# Patient Record
Sex: FEMALE | Race: BLACK OR AFRICAN AMERICAN | Employment: PART TIME | URBAN - METROPOLITAN AREA
[De-identification: names, ages, dates, MRNs, and addresses within clinical notes are randomized per-mention and may not be internally consistent; named-entity substitution may affect disease eponyms.]

---

## 2020-06-25 ENCOUNTER — HOSPITAL ENCOUNTER (INPATIENT)
Age: 65
LOS: 7 days | Discharge: HOME | DRG: 441 | End: 2020-07-03
Attending: EMERGENCY MEDICINE | Admitting: INTERNAL MEDICINE
Payer: MEDICARE

## 2020-06-25 ENCOUNTER — APPOINTMENT (OUTPATIENT)
Dept: GENERAL RADIOLOGY | Age: 65
DRG: 441 | End: 2020-06-25
Payer: MEDICARE

## 2020-06-25 PROCEDURE — 93005 ELECTROCARDIOGRAM TRACING: CPT | Performed by: EMERGENCY MEDICINE

## 2020-06-25 PROCEDURE — 71045 X-RAY EXAM CHEST 1 VIEW: CPT

## 2020-06-25 PROCEDURE — 99285 EMERGENCY DEPT VISIT HI MDM: CPT

## 2020-06-25 RX ORDER — ALBUTEROL SULFATE 90 UG/1
2 AEROSOL, METERED RESPIRATORY (INHALATION) EVERY 6 HOURS PRN
COMMUNITY
End: 2022-05-11 | Stop reason: SDUPTHER

## 2020-06-25 RX ORDER — HYDROCHLOROTHIAZIDE 25 MG/1
25 TABLET ORAL DAILY
Status: ON HOLD | COMMUNITY
End: 2021-11-04 | Stop reason: HOSPADM

## 2020-06-25 RX ORDER — IBUPROFEN 800 MG/1
800 TABLET ORAL EVERY 8 HOURS PRN
Status: ON HOLD | COMMUNITY
End: 2020-07-03 | Stop reason: HOSPADM

## 2020-06-25 RX ORDER — HYDROCODONE BITARTRATE AND ACETAMINOPHEN 7.5; 325 MG/1; MG/1
1 TABLET ORAL EVERY 6 HOURS PRN
Status: ON HOLD | COMMUNITY
End: 2020-07-03 | Stop reason: HOSPADM

## 2020-06-25 RX ORDER — ASPIRIN 81 MG/1
81 TABLET, CHEWABLE ORAL DAILY
COMMUNITY

## 2020-06-25 RX ORDER — ATORVASTATIN CALCIUM 80 MG/1
80 TABLET, FILM COATED ORAL DAILY
COMMUNITY
End: 2021-11-23 | Stop reason: SDUPTHER

## 2020-06-25 RX ORDER — LOSARTAN POTASSIUM 100 MG/1
100 TABLET ORAL DAILY
Status: ON HOLD | COMMUNITY
End: 2021-11-04 | Stop reason: HOSPADM

## 2020-06-25 RX ORDER — CEPHALEXIN 500 MG/1
500 CAPSULE ORAL 3 TIMES DAILY
Status: ON HOLD | COMMUNITY
End: 2020-07-03 | Stop reason: HOSPADM

## 2020-06-25 RX ORDER — IPRATROPIUM BROMIDE AND ALBUTEROL SULFATE 2.5; .5 MG/3ML; MG/3ML
1 SOLUTION RESPIRATORY (INHALATION) EVERY 4 HOURS
COMMUNITY
End: 2022-05-11 | Stop reason: SDUPTHER

## 2020-06-25 RX ORDER — GABAPENTIN 300 MG/1
300 CAPSULE ORAL 3 TIMES DAILY
COMMUNITY

## 2020-06-25 RX ORDER — 0.9 % SODIUM CHLORIDE 0.9 %
500 INTRAVENOUS SOLUTION INTRAVENOUS ONCE
Status: COMPLETED | OUTPATIENT
Start: 2020-06-25 | End: 2020-06-26

## 2020-06-25 RX ORDER — SUMATRIPTAN 20 MG/1
1 SPRAY NASAL DAILY PRN
COMMUNITY
End: 2021-04-05

## 2020-06-25 ASSESSMENT — PAIN SCALES - GENERAL: PAINLEVEL_OUTOF10: 5

## 2020-06-26 ENCOUNTER — APPOINTMENT (OUTPATIENT)
Dept: ULTRASOUND IMAGING | Age: 65
DRG: 441 | End: 2020-06-26
Payer: MEDICARE

## 2020-06-26 ENCOUNTER — APPOINTMENT (OUTPATIENT)
Dept: CT IMAGING | Age: 65
DRG: 441 | End: 2020-06-26
Payer: MEDICARE

## 2020-06-26 PROBLEM — E87.5 HYPERKALEMIA: Status: ACTIVE | Noted: 2020-06-26

## 2020-06-26 PROBLEM — E87.6 HYPOKALEMIA: Status: ACTIVE | Noted: 2020-06-26

## 2020-06-26 PROBLEM — I25.10 CAD (CORONARY ARTERY DISEASE): Status: ACTIVE | Noted: 2020-06-26

## 2020-06-26 PROBLEM — Z20.822 SUSPECTED COVID-19 VIRUS INFECTION: Status: ACTIVE | Noted: 2020-06-26

## 2020-06-26 PROBLEM — R74.01 TRANSAMINITIS: Status: ACTIVE | Noted: 2020-06-26

## 2020-06-26 PROBLEM — R77.8 TROPONIN LEVEL ELEVATED: Status: ACTIVE | Noted: 2020-06-26

## 2020-06-26 PROBLEM — I21.4 NSTEMI (NON-ST ELEVATED MYOCARDIAL INFARCTION) (HCC): Status: ACTIVE | Noted: 2020-06-26

## 2020-06-26 PROBLEM — K74.60 CIRRHOSIS (HCC): Status: ACTIVE | Noted: 2020-06-26

## 2020-06-26 PROBLEM — I10 HTN (HYPERTENSION): Status: ACTIVE | Noted: 2020-06-26

## 2020-06-26 PROBLEM — R79.89 TROPONIN LEVEL ELEVATED: Status: ACTIVE | Noted: 2020-06-26

## 2020-06-26 PROBLEM — F10.10 ALCOHOL ABUSE: Status: ACTIVE | Noted: 2020-06-26

## 2020-06-26 PROBLEM — R94.31 ABNORMAL EKG: Status: ACTIVE | Noted: 2020-06-26

## 2020-06-26 PROBLEM — J44.9 COPD (CHRONIC OBSTRUCTIVE PULMONARY DISEASE) (HCC): Status: ACTIVE | Noted: 2020-06-26

## 2020-06-26 PROBLEM — R10.9 ABDOMINAL PAIN: Status: ACTIVE | Noted: 2020-06-26

## 2020-06-26 LAB
A/G RATIO: 0.6 (ref 1.1–2.2)
A/G RATIO: 0.7 (ref 1.1–2.2)
ALBUMIN SERPL-MCNC: 2.8 G/DL (ref 3.4–5)
ALBUMIN SERPL-MCNC: 3.1 G/DL (ref 3.4–5)
ALP BLD-CCNC: 245 U/L (ref 40–129)
ALP BLD-CCNC: 281 U/L (ref 40–129)
ALT SERPL-CCNC: 559 U/L (ref 10–40)
ALT SERPL-CCNC: 606 U/L (ref 10–40)
ANION GAP SERPL CALCULATED.3IONS-SCNC: 6 MMOL/L (ref 3–16)
ANION GAP SERPL CALCULATED.3IONS-SCNC: 8 MMOL/L (ref 3–16)
ANION GAP SERPL CALCULATED.3IONS-SCNC: 9 MMOL/L (ref 3–16)
AST SERPL-CCNC: 662 U/L (ref 15–37)
AST SERPL-CCNC: 723 U/L (ref 15–37)
BASOPHILS ABSOLUTE: 0.1 K/UL (ref 0–0.2)
BASOPHILS RELATIVE PERCENT: 1.2 %
BILIRUB SERPL-MCNC: 1.9 MG/DL (ref 0–1)
BILIRUB SERPL-MCNC: 2.1 MG/DL (ref 0–1)
BILIRUBIN URINE: ABNORMAL
BLOOD, URINE: ABNORMAL
BUN BLDV-MCNC: 14 MG/DL (ref 7–20)
BUN BLDV-MCNC: 14 MG/DL (ref 7–20)
BUN BLDV-MCNC: 15 MG/DL (ref 7–20)
CALCIUM SERPL-MCNC: 8.3 MG/DL (ref 8.3–10.6)
CALCIUM SERPL-MCNC: 8.6 MG/DL (ref 8.3–10.6)
CALCIUM SERPL-MCNC: 8.9 MG/DL (ref 8.3–10.6)
CHLORIDE BLD-SCNC: 101 MMOL/L (ref 99–110)
CHLORIDE BLD-SCNC: 101 MMOL/L (ref 99–110)
CHLORIDE BLD-SCNC: 105 MMOL/L (ref 99–110)
CLARITY: CLEAR
CO2: 22 MMOL/L (ref 21–32)
CO2: 24 MMOL/L (ref 21–32)
CO2: 25 MMOL/L (ref 21–32)
COLOR: ABNORMAL
CREAT SERPL-MCNC: 0.6 MG/DL (ref 0.6–1.2)
CREAT SERPL-MCNC: 0.7 MG/DL (ref 0.6–1.2)
CREAT SERPL-MCNC: 0.8 MG/DL (ref 0.6–1.2)
EKG ATRIAL RATE: 59 BPM
EKG DIAGNOSIS: NORMAL
EKG P AXIS: 35 DEGREES
EKG P-R INTERVAL: 80 MS
EKG Q-T INTERVAL: 460 MS
EKG QRS DURATION: 102 MS
EKG QTC CALCULATION (BAZETT): 455 MS
EKG R AXIS: -8 DEGREES
EKG T AXIS: 27 DEGREES
EKG VENTRICULAR RATE: 59 BPM
EOSINOPHILS ABSOLUTE: 0.2 K/UL (ref 0–0.6)
EOSINOPHILS RELATIVE PERCENT: 2.7 %
EPITHELIAL CELLS, UA: 5 /HPF (ref 0–5)
ETHANOL: NORMAL MG/DL (ref 0–0.08)
GFR AFRICAN AMERICAN: >60
GFR NON-AFRICAN AMERICAN: >60
GLOBULIN: 4.5 G/DL
GLOBULIN: 4.6 G/DL
GLUCOSE BLD-MCNC: 105 MG/DL (ref 70–99)
GLUCOSE BLD-MCNC: 78 MG/DL (ref 70–99)
GLUCOSE BLD-MCNC: 89 MG/DL (ref 70–99)
GLUCOSE URINE: NEGATIVE MG/DL
HCT VFR BLD CALC: 40.4 % (ref 36–48)
HEMOGLOBIN: 13.8 G/DL (ref 12–16)
HYALINE CASTS: ABNORMAL /LPF (ref 0–2)
INR BLD: 1.22 (ref 0.86–1.14)
KETONES, URINE: NEGATIVE MG/DL
LEUKOCYTE ESTERASE, URINE: ABNORMAL
LIPASE: 82 U/L (ref 13–60)
LV EF: 70 %
LVEF MODALITY: NORMAL
LYMPHOCYTES ABSOLUTE: 2.2 K/UL (ref 1–5.1)
LYMPHOCYTES RELATIVE PERCENT: 38.9 %
MCH RBC QN AUTO: 32.8 PG (ref 26–34)
MCHC RBC AUTO-ENTMCNC: 34.1 G/DL (ref 31–36)
MCV RBC AUTO: 96.2 FL (ref 80–100)
MICROSCOPIC EXAMINATION: YES
MONOCYTES ABSOLUTE: 0.9 K/UL (ref 0–1.3)
MONOCYTES RELATIVE PERCENT: 16.3 %
NEUTROPHILS ABSOLUTE: 2.3 K/UL (ref 1.7–7.7)
NEUTROPHILS RELATIVE PERCENT: 40.9 %
NITRITE, URINE: NEGATIVE
PDW BLD-RTO: 15.2 % (ref 12.4–15.4)
PH UA: 7 (ref 5–8)
PLATELET # BLD: 181 K/UL (ref 135–450)
PMV BLD AUTO: 8.4 FL (ref 5–10.5)
POTASSIUM REFLEX MAGNESIUM: 5.7 MMOL/L (ref 3.5–5.1)
POTASSIUM SERPL-SCNC: 3.3 MMOL/L (ref 3.5–5.1)
POTASSIUM SERPL-SCNC: 4 MMOL/L (ref 3.5–5.1)
PROTEIN UA: 100 MG/DL
PROTHROMBIN TIME: 14.2 SEC (ref 10–13.2)
RBC # BLD: 4.2 M/UL (ref 4–5.2)
RBC UA: 2 /HPF (ref 0–4)
REASON FOR REJECTION: NORMAL
REJECTED TEST: NORMAL
SARS-COV-2, PCR: NOT DETECTED
SODIUM BLD-SCNC: 131 MMOL/L (ref 136–145)
SODIUM BLD-SCNC: 134 MMOL/L (ref 136–145)
SODIUM BLD-SCNC: 136 MMOL/L (ref 136–145)
SPECIFIC GRAVITY UA: 1.02 (ref 1–1.03)
TOTAL PROTEIN: 7.3 G/DL (ref 6.4–8.2)
TOTAL PROTEIN: 7.7 G/DL (ref 6.4–8.2)
TRIGL SERPL-MCNC: 94 MG/DL (ref 0–150)
TROPONIN: 0.03 NG/ML
TROPONIN: 0.03 NG/ML
TROPONIN: 0.05 NG/ML
TROPONIN: 0.06 NG/ML
URINE REFLEX TO CULTURE: ABNORMAL
URINE TYPE: ABNORMAL
UROBILINOGEN, URINE: 4 E.U./DL
WBC # BLD: 5.6 K/UL (ref 4–11)
WBC UA: 9 /HPF (ref 0–5)

## 2020-06-26 PROCEDURE — 1200000000 HC SEMI PRIVATE

## 2020-06-26 PROCEDURE — 2580000003 HC RX 258: Performed by: INTERNAL MEDICINE

## 2020-06-26 PROCEDURE — 80053 COMPREHEN METABOLIC PANEL: CPT

## 2020-06-26 PROCEDURE — 6370000000 HC RX 637 (ALT 250 FOR IP): Performed by: INTERNAL MEDICINE

## 2020-06-26 PROCEDURE — 6360000002 HC RX W HCPCS: Performed by: INTERNAL MEDICINE

## 2020-06-26 PROCEDURE — U0003 INFECTIOUS AGENT DETECTION BY NUCLEIC ACID (DNA OR RNA); SEVERE ACUTE RESPIRATORY SYNDROME CORONAVIRUS 2 (SARS-COV-2) (CORONAVIRUS DISEASE [COVID-19]), AMPLIFIED PROBE TECHNIQUE, MAKING USE OF HIGH THROUGHPUT TECHNOLOGIES AS DESCRIBED BY CMS-2020-01-R: HCPCS

## 2020-06-26 PROCEDURE — 96374 THER/PROPH/DIAG INJ IV PUSH: CPT

## 2020-06-26 PROCEDURE — 80074 ACUTE HEPATITIS PANEL: CPT

## 2020-06-26 PROCEDURE — 85610 PROTHROMBIN TIME: CPT

## 2020-06-26 PROCEDURE — 84484 ASSAY OF TROPONIN QUANT: CPT

## 2020-06-26 PROCEDURE — 85025 COMPLETE CBC W/AUTO DIFF WBC: CPT

## 2020-06-26 PROCEDURE — 94640 AIRWAY INHALATION TREATMENT: CPT

## 2020-06-26 PROCEDURE — 93306 TTE W/DOPPLER COMPLETE: CPT

## 2020-06-26 PROCEDURE — 6360000002 HC RX W HCPCS: Performed by: EMERGENCY MEDICINE

## 2020-06-26 PROCEDURE — 93010 ELECTROCARDIOGRAM REPORT: CPT | Performed by: INTERNAL MEDICINE

## 2020-06-26 PROCEDURE — 81001 URINALYSIS AUTO W/SCOPE: CPT

## 2020-06-26 PROCEDURE — 86706 HEP B SURFACE ANTIBODY: CPT

## 2020-06-26 PROCEDURE — 76705 ECHO EXAM OF ABDOMEN: CPT

## 2020-06-26 PROCEDURE — 86038 ANTINUCLEAR ANTIBODIES: CPT

## 2020-06-26 PROCEDURE — 84478 ASSAY OF TRIGLYCERIDES: CPT

## 2020-06-26 PROCEDURE — 2580000003 HC RX 258: Performed by: EMERGENCY MEDICINE

## 2020-06-26 PROCEDURE — C9113 INJ PANTOPRAZOLE SODIUM, VIA: HCPCS | Performed by: INTERNAL MEDICINE

## 2020-06-26 PROCEDURE — 74176 CT ABD & PELVIS W/O CONTRAST: CPT

## 2020-06-26 PROCEDURE — 86708 HEPATITIS A ANTIBODY: CPT

## 2020-06-26 PROCEDURE — 36415 COLL VENOUS BLD VENIPUNCTURE: CPT

## 2020-06-26 PROCEDURE — 99222 1ST HOSP IP/OBS MODERATE 55: CPT | Performed by: INTERNAL MEDICINE

## 2020-06-26 PROCEDURE — 96361 HYDRATE IV INFUSION ADD-ON: CPT

## 2020-06-26 PROCEDURE — 2060000000 HC ICU INTERMEDIATE R&B

## 2020-06-26 PROCEDURE — 86039 ANTINUCLEAR ANTIBODIES (ANA): CPT

## 2020-06-26 PROCEDURE — 83516 IMMUNOASSAY NONANTIBODY: CPT

## 2020-06-26 PROCEDURE — 6370000000 HC RX 637 (ALT 250 FOR IP): Performed by: EMERGENCY MEDICINE

## 2020-06-26 PROCEDURE — 83690 ASSAY OF LIPASE: CPT

## 2020-06-26 PROCEDURE — G0480 DRUG TEST DEF 1-7 CLASSES: HCPCS

## 2020-06-26 PROCEDURE — 94761 N-INVAS EAR/PLS OXIMETRY MLT: CPT

## 2020-06-26 RX ORDER — ATORVASTATIN CALCIUM 80 MG/1
80 TABLET, FILM COATED ORAL DAILY
Status: DISCONTINUED | OUTPATIENT
Start: 2020-06-26 | End: 2020-07-03 | Stop reason: HOSPADM

## 2020-06-26 RX ORDER — MAGNESIUM SULFATE IN WATER 40 MG/ML
2 INJECTION, SOLUTION INTRAVENOUS PRN
Status: DISCONTINUED | OUTPATIENT
Start: 2020-06-26 | End: 2020-07-03 | Stop reason: HOSPADM

## 2020-06-26 RX ORDER — PANTOPRAZOLE SODIUM 40 MG/10ML
40 INJECTION, POWDER, LYOPHILIZED, FOR SOLUTION INTRAVENOUS DAILY
Status: DISCONTINUED | OUTPATIENT
Start: 2020-06-26 | End: 2020-06-27

## 2020-06-26 RX ORDER — POTASSIUM CHLORIDE 20 MEQ/1
40 TABLET, EXTENDED RELEASE ORAL ONCE
Status: COMPLETED | OUTPATIENT
Start: 2020-06-26 | End: 2020-06-26

## 2020-06-26 RX ORDER — POTASSIUM CHLORIDE 7.45 MG/ML
10 INJECTION INTRAVENOUS PRN
Status: DISCONTINUED | OUTPATIENT
Start: 2020-06-26 | End: 2020-07-03 | Stop reason: HOSPADM

## 2020-06-26 RX ORDER — LOSARTAN POTASSIUM 100 MG/1
100 TABLET ORAL DAILY
Status: DISCONTINUED | OUTPATIENT
Start: 2020-06-26 | End: 2020-06-27

## 2020-06-26 RX ORDER — MORPHINE SULFATE 4 MG/ML
4 INJECTION, SOLUTION INTRAMUSCULAR; INTRAVENOUS EVERY 4 HOURS PRN
Status: DISCONTINUED | OUTPATIENT
Start: 2020-06-26 | End: 2020-07-01

## 2020-06-26 RX ORDER — SODIUM CHLORIDE, SODIUM LACTATE, POTASSIUM CHLORIDE, CALCIUM CHLORIDE 600; 310; 30; 20 MG/100ML; MG/100ML; MG/100ML; MG/100ML
INJECTION, SOLUTION INTRAVENOUS CONTINUOUS
Status: DISCONTINUED | OUTPATIENT
Start: 2020-06-26 | End: 2020-06-28

## 2020-06-26 RX ORDER — GABAPENTIN 300 MG/1
300 CAPSULE ORAL 3 TIMES DAILY
Status: DISCONTINUED | OUTPATIENT
Start: 2020-06-26 | End: 2020-07-03 | Stop reason: HOSPADM

## 2020-06-26 RX ORDER — PROMETHAZINE HYDROCHLORIDE 25 MG/1
12.5 TABLET ORAL EVERY 6 HOURS PRN
Status: DISCONTINUED | OUTPATIENT
Start: 2020-06-26 | End: 2020-07-03 | Stop reason: HOSPADM

## 2020-06-26 RX ORDER — ASPIRIN 81 MG/1
81 TABLET, CHEWABLE ORAL DAILY
Status: DISCONTINUED | OUTPATIENT
Start: 2020-06-27 | End: 2020-06-29

## 2020-06-26 RX ORDER — ACETAMINOPHEN 325 MG/1
650 TABLET ORAL EVERY 6 HOURS PRN
Status: DISCONTINUED | OUTPATIENT
Start: 2020-06-26 | End: 2020-07-03 | Stop reason: HOSPADM

## 2020-06-26 RX ORDER — POTASSIUM CHLORIDE 20 MEQ/1
40 TABLET, EXTENDED RELEASE ORAL PRN
Status: DISCONTINUED | OUTPATIENT
Start: 2020-06-26 | End: 2020-07-03 | Stop reason: HOSPADM

## 2020-06-26 RX ORDER — ONDANSETRON 2 MG/ML
4 INJECTION INTRAMUSCULAR; INTRAVENOUS EVERY 6 HOURS PRN
Status: DISCONTINUED | OUTPATIENT
Start: 2020-06-26 | End: 2020-07-03 | Stop reason: HOSPADM

## 2020-06-26 RX ORDER — POLYETHYLENE GLYCOL 3350 17 G/17G
17 POWDER, FOR SOLUTION ORAL DAILY PRN
Status: DISCONTINUED | OUTPATIENT
Start: 2020-06-26 | End: 2020-07-03 | Stop reason: HOSPADM

## 2020-06-26 RX ORDER — ONDANSETRON 2 MG/ML
4 INJECTION INTRAMUSCULAR; INTRAVENOUS ONCE
Status: COMPLETED | OUTPATIENT
Start: 2020-06-26 | End: 2020-06-26

## 2020-06-26 RX ORDER — DICYCLOMINE HYDROCHLORIDE 10 MG/1
20 CAPSULE ORAL ONCE
Status: COMPLETED | OUTPATIENT
Start: 2020-06-26 | End: 2020-06-26

## 2020-06-26 RX ORDER — NITROGLYCERIN 0.4 MG/1
0.4 TABLET SUBLINGUAL EVERY 5 MIN PRN
Status: DISCONTINUED | OUTPATIENT
Start: 2020-06-26 | End: 2020-07-03 | Stop reason: HOSPADM

## 2020-06-26 RX ORDER — ONDANSETRON 4 MG/1
4 TABLET, FILM COATED ORAL EVERY 8 HOURS PRN
Qty: 10 TABLET | Refills: 0 | Status: SHIPPED | OUTPATIENT
Start: 2020-06-26 | End: 2020-06-26

## 2020-06-26 RX ORDER — SODIUM CHLORIDE 0.9 % (FLUSH) 0.9 %
10 SYRINGE (ML) INJECTION PRN
Status: DISCONTINUED | OUTPATIENT
Start: 2020-06-26 | End: 2020-07-03 | Stop reason: HOSPADM

## 2020-06-26 RX ORDER — ASPIRIN 81 MG/1
324 TABLET, CHEWABLE ORAL ONCE
Status: COMPLETED | OUTPATIENT
Start: 2020-06-26 | End: 2020-06-26

## 2020-06-26 RX ORDER — DICYCLOMINE HYDROCHLORIDE 10 MG/1
20 CAPSULE ORAL EVERY 6 HOURS PRN
Qty: 20 CAPSULE | Refills: 0 | Status: SHIPPED | OUTPATIENT
Start: 2020-06-26 | End: 2020-06-26

## 2020-06-26 RX ORDER — SODIUM CHLORIDE 0.9 % (FLUSH) 0.9 %
10 SYRINGE (ML) INJECTION EVERY 12 HOURS SCHEDULED
Status: DISCONTINUED | OUTPATIENT
Start: 2020-06-26 | End: 2020-07-03 | Stop reason: HOSPADM

## 2020-06-26 RX ORDER — ACETAMINOPHEN 650 MG/1
650 SUPPOSITORY RECTAL EVERY 6 HOURS PRN
Status: DISCONTINUED | OUTPATIENT
Start: 2020-06-26 | End: 2020-07-03 | Stop reason: HOSPADM

## 2020-06-26 RX ADMIN — SODIUM CHLORIDE, POTASSIUM CHLORIDE, SODIUM LACTATE AND CALCIUM CHLORIDE: 600; 310; 30; 20 INJECTION, SOLUTION INTRAVENOUS at 14:13

## 2020-06-26 RX ADMIN — POTASSIUM CHLORIDE 40 MEQ: 1500 TABLET, EXTENDED RELEASE ORAL at 03:16

## 2020-06-26 RX ADMIN — ATORVASTATIN CALCIUM 80 MG: 80 TABLET, FILM COATED ORAL at 14:11

## 2020-06-26 RX ADMIN — SODIUM CHLORIDE 500 ML: 9 INJECTION, SOLUTION INTRAVENOUS at 00:35

## 2020-06-26 RX ADMIN — DICYCLOMINE HYDROCHLORIDE 20 MG: 10 CAPSULE ORAL at 00:45

## 2020-06-26 RX ADMIN — ASPIRIN 81 MG 324 MG: 81 TABLET ORAL at 03:16

## 2020-06-26 RX ADMIN — PANTOPRAZOLE SODIUM 40 MG: 40 INJECTION, POWDER, FOR SOLUTION INTRAVENOUS at 14:11

## 2020-06-26 RX ADMIN — ONDANSETRON 4 MG: 2 INJECTION INTRAMUSCULAR; INTRAVENOUS at 00:45

## 2020-06-26 RX ADMIN — GLYCOPYRROLATE AND FORMOTEROL FUMARATE 2 PUFF: 9; 4.8 AEROSOL, METERED RESPIRATORY (INHALATION) at 21:10

## 2020-06-26 RX ADMIN — SODIUM CHLORIDE, POTASSIUM CHLORIDE, SODIUM LACTATE AND CALCIUM CHLORIDE: 600; 310; 30; 20 INJECTION, SOLUTION INTRAVENOUS at 21:41

## 2020-06-26 RX ADMIN — GABAPENTIN 300 MG: 300 CAPSULE ORAL at 21:37

## 2020-06-26 RX ADMIN — GABAPENTIN 300 MG: 300 CAPSULE ORAL at 14:12

## 2020-06-26 RX ADMIN — ENOXAPARIN SODIUM 70 MG: 80 INJECTION SUBCUTANEOUS at 21:37

## 2020-06-26 RX ADMIN — METOPROLOL TARTRATE 25 MG: 25 TABLET, FILM COATED ORAL at 14:11

## 2020-06-26 RX ADMIN — LOSARTAN POTASSIUM 100 MG: 100 TABLET, FILM COATED ORAL at 14:11

## 2020-06-26 RX ADMIN — ENOXAPARIN SODIUM 70 MG: 80 INJECTION SUBCUTANEOUS at 14:11

## 2020-06-26 ASSESSMENT — PAIN DESCRIPTION - ORIENTATION: ORIENTATION: UPPER

## 2020-06-26 ASSESSMENT — PAIN SCALES - GENERAL
PAINLEVEL_OUTOF10: 0
PAINLEVEL_OUTOF10: 2

## 2020-06-26 ASSESSMENT — PAIN DESCRIPTION - ONSET: ONSET: ON-GOING

## 2020-06-26 ASSESSMENT — PAIN DESCRIPTION - FREQUENCY: FREQUENCY: CONTINUOUS

## 2020-06-26 ASSESSMENT — PAIN DESCRIPTION - PAIN TYPE: TYPE: ACUTE PAIN

## 2020-06-26 ASSESSMENT — PAIN DESCRIPTION - LOCATION: LOCATION: ABDOMEN

## 2020-06-26 NOTE — H&P
HOSPITALISTS HISTORY AND PHYSICAL    6/26/2020 7:53 AM    Patient Information:  Dang Baca is a 76 y.o. female [id number]  PCP:  Cameron Short MD (Tel: [phone] )    Chief complaint:    Chief Complaint   Patient presents with    Abdominal Pain     Pt states has a uti, antibiotics, and has hx of high liver enzymes and pancreas issues. Pt states abdominal pain x months, worse today and patient states feels like her equilibrium is off as well. History of Present Illness:  Juan Gill is a 76 y.o. female with history of alcoholic pancreatitis, CAD s/p stents, COPD, HTN, h/o alcohol abuse came to ER with complaints of abdominal pain. States she drank vodka on her birthday. Had abdominal pain a few days later. Complains of epigastric pain. Has post prandial pain and nausea. Some GERD. No melena, hematochezia, vomiting, CP. Has progressing HATFIELD and SOB. Has less functional capacity than baseline over weeks. No vaginal bleeding, dysuria or hematuria. No numbness, weakness or tingling. Found to have NSTEMI and acute pancreatitis with transaminitis and cirrhosis in ED. Otherwise complete ROS is negative unless listed above. REVIEW OF SYSTEMS:   Pertinent positives as noted in HPI. All other systems were reviewed and are negative. Past Medical History:   has a past medical history of HTN (hypertension). Past Surgical History:   has a past surgical history that includes Abdomen surgery. Medications:  No current facility-administered medications on file prior to encounter.       Current Outpatient Medications on File Prior to Encounter   Medication Sig Dispense Refill    aclidinium (TUDORZA PRESSAIR) 400 MCG/ACT AEPB inhaler Inhale 1 puff into the lungs 2 times daily      albuterol sulfate HFA (PROAIR HFA) 108 (90 Base) MCG/ACT inhaler Inhale 2 puffs into the lungs every 6 hours as needed for Wheezing      aspirin 81 MG chewable tablet Take 81 mg by mouth daily      atorvastatin (LIPITOR) 80 MG tablet Take 80 mg by mouth daily      gabapentin (NEURONTIN) 300 MG capsule Take 300 mg by mouth 3 times daily.  hydroCHLOROthiazide (HYDRODIURIL) 25 MG tablet Take 25 mg by mouth daily      HYDROcodone-acetaminophen (NORCO) 7.5-325 MG per tablet Take 1 tablet by mouth every 6 hours as needed for Pain.  ibuprofen (ADVIL;MOTRIN) 800 MG tablet Take 800 mg by mouth every 8 hours as needed for Pain      ipratropium-albuterol (DUONEB) 0.5-2.5 (3) MG/3ML SOLN nebulizer solution Inhale 1 vial into the lungs every 4 hours      losartan (COZAAR) 100 MG tablet Take 100 mg by mouth daily      metoprolol tartrate (LOPRESSOR) 25 MG tablet Take 25 mg by mouth 2 times daily      SUMAtriptan (IMITREX) 20 MG/ACT nasal spray 1 spray by Nasal route daily as needed for Migraine      umeclidinium-vilanterol (ANORO ELLIPTA) 62.5-25 MCG/INH AEPB inhaler Inhale 1 puff into the lungs daily      cephALEXin (KEFLEX) 500 MG capsule Take 500 mg by mouth 3 times daily         Allergies: Allergies   Allergen Reactions    Lisinopril Swelling        Social History:  Patient Lives with family   reports that she has never smoked. She has never used smokeless tobacco. She reports previous alcohol use. She reports that she does not use drugs. Family History:  family history includes Diabetes in her sister. Physical Exam:  BP (!) 102/55   Pulse 58   Temp 98.5 °F (36.9 °C) (Oral)   Resp 21   Ht 5' 6\" (1.676 m)   Wt 157 lb (71.2 kg)   SpO2 92%   BMI 25.34 kg/m²     General appearance:  Appears comfortable. Well nourished  Eyes: Sclera clear, pupils equal  ENT: Moist mucus membranes, no thrush. Trachea midline. Cardiovascular: Regular rhythm, normal S1, S2. No murmur, gallop, rub.  No edema in lower extremities  Respiratory: Clear to auscultation bilaterally, no wheeze, good inspiratory effort  Gastrointestinal: Abdomen soft, epigastric/LUQ tenderness, not distended, normal bowel sounds  Musculoskeletal: No cyanosis in digits, neck supple  Neurology: Grossly intact. Alert and oriented in time, place and person. No speech or motor deficits  Psychiatry: Appropriate affect. Not agitated  Skin: Warm, dry, normal turgor, no rash  Brisk capillary refill, peripheral pulses palpable   Labs:  CBC:   Lab Results   Component Value Date    WBC 5.6 06/26/2020    RBC 4.20 06/26/2020    HGB 13.8 06/26/2020    HCT 40.4 06/26/2020    MCV 96.2 06/26/2020    MCH 32.8 06/26/2020    MCHC 34.1 06/26/2020    RDW 15.2 06/26/2020     06/26/2020    MPV 8.4 06/26/2020     BMP:    Lab Results   Component Value Date     06/26/2020    K 3.3 06/26/2020    K 5.7 06/26/2020     06/26/2020    CO2 24 06/26/2020    BUN 14 06/26/2020    CREATININE 0.7 06/26/2020    CALCIUM 8.6 06/26/2020    GFRAA >60 06/26/2020    LABGLOM >60 06/26/2020    GLUCOSE 105 06/26/2020     CT ABDOMEN PELVIS WO CONTRAST Additional Contrast? None   Final Result   No acute abnormality. The liver appears cirrhotic and possibly fibrotic. This can be further evaluated with nonemergent MRI. XR CHEST PORTABLE   Final Result   Hyperplasia lungs. Bibasilar increased density could be related to bronchovascular padding. Pneumonia could be considered, and particularly given asymmetric right mid   lung increased opacity. Problem List  Principal Problem:    Abdominal pain  Active Problems:    Troponin level elevated    NSTEMI (non-ST elevated myocardial infarction) (HCC)    Transaminitis    CAD (coronary artery disease)    COPD (chronic obstructive pulmonary disease) (HCC)    HTN (hypertension)    Hyperkalemia    Hypokalemia    Cirrhosis (HCC)    Alcohol abuse    Suspected COVID-19 virus infection    Abnormal EKG  Resolved Problems:    * No resolved hospital problems. *        Assessment/Plan:   1.  Droplet plus isolation until COVID ruled out  2. NPO  3. IVF  4. Lovenox bid  5. Cont ASA, Lipitor, Lopressor, and Cozaar  6. Check RUQ US for abdominal pain  7. Check TG  8. Stop HCTZ as this is rare cause of pancreatitis  9. Morphine IV PRN   10. Serial troponin  11. Cardiology consult for NSTEMI  12. GI consult for abdominal pain  13. Telemetry  14. Check Ethanol level  15. Banana bag X 3 days  16. Check Echo for SOB  17. Cont Spiriva and inhalers from home      DVT prophylaxis Lovenox bid  Code status Full code  Diet  NPO  IV access Peripheral  Paredes Catheter No    Admit as inpatient. I anticipate hospitalization spanning more than two midnights for investigation and treatment of the above medically necessary diagnoses. Discussed with patient and nursing. Will see what GI and Cardio think. Home when ready.     Tono Romero MD    6/26/2020 7:53 AM

## 2020-06-26 NOTE — FLOWSHEET NOTE
06/26/20 1215   Vital Signs   Temp 98.4 °F (36.9 °C)   Temp Source Oral   Pulse 56   Heart Rate Source Brachial   Resp 11   BP (!) 106/57   BP Location Right upper arm   MAP (mmHg) 65   Patient Position Semi fowlers   Level of Consciousness 0   MEWS Score 0   Patient Currently in Pain Yes   Height and Weight   Height 5' 6\" (1.676 m)   Weight 157 lb (71.2 kg)   Weight Method Stated   BSA (Calculated - sq m) 1.82 sq meters   BMI (Calculated) 25.4   Pain Assessment   Pain Assessment 0-10   Pain Level 0   Non-Pharmaceutical Pain Intervention(s) Declines   Response to Pain Intervention Patient Satisfied   Oxygen Therapy   SpO2 96 %   Pulse Oximeter Device Mode Continuous   Pulse Oximeter Device Location Finger   O2 Device None (Room air)     Admission assessment compete. VSS. Pt. Is alert and oriented resting comfortably in bed. Pt. Has complaints of stomach tenderness and intermittent stomach pain. Patient has no complaints of chest pain or SOB. POC discussed with patient and patient states understanding and is in agreement with plan. Call light and bedside table within reach. Will continue to monitor.  María Flynn

## 2020-06-26 NOTE — PLAN OF CARE
Problem: Safety:  Goal: Free from accidental physical injury  Description: Free from accidental physical injury  Outcome: Ongoing     Problem: Pain:  Goal: Pain level will decrease  Description: Pain level will decrease  Outcome: Ongoing  Goal: Control of acute pain  Description: Control of acute pain  Outcome: Ongoing

## 2020-06-26 NOTE — CONSULTS
[address] [facility]  [phone]      Chief Complaint   Patient presents with    Abdominal Pain     Pt states has a uti, antibiotics, and has hx of high liver enzymes and pancreas issues. Pt states abdominal pain x months, worse today and patient states feels like her equilibrium is off as well. History of Present Illness:  Sarita Agrawal is a 76 y.o. patient who presented to the hospital with complaints of abd pain. She abused etoh and came in for epigastric pain, nausea. Denies CP/SOB. Admitted for acute pancreatitis and liver failure. Troponin was elevated. I have been asked to provide consultation regarding further management and testing. She has h/o CAD s/p PCI 2 years ago at [facility]    Past Medical History:   has a past medical history of HTN (hypertension). Surgical History:   has a past surgical history that includes Abdomen surgery. Social History:   reports that she has never smoked. She has never used smokeless tobacco. She reports previous alcohol use. She reports that she does not use drugs. Family History:  family history includes Diabetes in her sister. Home Medications:  Were reviewed and are listed in nursing record. and/or listed below  Prior to Admission medications    Medication Sig Start Date End Date Taking? Authorizing Provider   aclidinium (TUDORZA PRESSAIR) 400 MCG/ACT AEPB inhaler Inhale 1 puff into the lungs 2 times daily   Yes Historical Provider, MD   albuterol sulfate HFA (PROAIR HFA) 108 (90 Base) MCG/ACT inhaler Inhale 2 puffs into the lungs every 6 hours as needed for Wheezing   Yes Historical Provider, MD   aspirin 81 MG chewable tablet Take 81 mg by mouth daily   Yes Historical Provider, MD   atorvastatin (LIPITOR) 80 MG tablet Take 80 mg by mouth daily   Yes Historical Provider, MD   gabapentin (NEURONTIN) 300 MG capsule Take 300 mg by mouth 3 times daily.    Yes Historical Provider, MD   hydroCHLOROthiazide (HYDRODIURIL) 25 MG tablet Take 25 mg by mouth daily   Yes Historical Provider, MD   HYDROcodone-acetaminophen (NORCO) 7.5-325 MG per tablet Take 1 tablet by mouth every 6 hours as needed for Pain.    Yes Historical Provider, MD   ibuprofen (ADVIL;MOTRIN) 800 MG tablet Take 800 mg by mouth every 8 hours as needed for Pain   Yes Historical Provider, MD   ipratropium-albuterol (DUONEB) 0.5-2.5 (3) MG/3ML SOLN nebulizer solution Inhale 1 vial into the lungs every 4 hours   Yes Historical Provider, MD   losartan (COZAAR) 100 MG tablet Take 100 mg by mouth daily   Yes Historical Provider, MD   metoprolol tartrate (LOPRESSOR) 25 MG tablet Take 25 mg by mouth 2 times daily   Yes Historical Provider, MD   umeclidinium-vilanterol (ANORO ELLIPTA) 62.5-25 MCG/INH AEPB inhaler Inhale 1 puff into the lungs daily   Yes Historical Provider, MD   cephALEXin (KEFLEX) 500 MG capsule Take 500 mg by mouth 3 times daily   Yes Historical Provider, MD   SUMAtriptan (IMITREX) 20 MG/ACT nasal spray 1 spray by Nasal route daily as needed for Migraine    Historical Provider, MD        Current Medications:  Current Facility-Administered Medications   Medication Dose Route Frequency Provider Last Rate Last Dose    [START ON 6/27/2020] aspirin chewable tablet 81 mg  81 mg Oral Daily Lv Ozuna MD        atorvastatin (LIPITOR) tablet 80 mg  80 mg Oral Daily Lv Ozuna MD        losartan (COZAAR) tablet 100 mg  100 mg Oral Daily Lv Ozuna MD        metoprolol tartrate (LOPRESSOR) tablet 25 mg  25 mg Oral BID Lv Ozuna MD        gabapentin (NEURONTIN) capsule 300 mg  300 mg Oral TID Enrico Macias MD        sodium chloride flush 0.9 % injection 10 mL  10 mL Intravenous 2 times per day Enrico Macias MD        sodium chloride flush 0.9 % injection 10 mL  10 mL Intravenous PRN Enrico Macias MD        acetaminophen (TYLENOL) tablet 650 mg  650 mg Oral Q6H PRN Enrico Macias MD        Or    acetaminophen (TYLENOL) suppository 650 mg 650 mg Rectal Q6H PRN Lennox Pearson MD        polyethylene glycol (GLYCOLAX) packet 17 g  17 g Oral Daily PRN Lennox Pearson MD        promethazine (PHENERGAN) tablet 12.5 mg  12.5 mg Oral Q6H PRN Lennox Pearson MD        Or    ondansetron (ZOFRAN) injection 4 mg  4 mg Intravenous Q6H PRN Lennox Pearson MD        enoxaparin (LOVENOX) injection 70 mg  1 mg/kg Subcutaneous BID Lennox Pearson MD        lactated ringers infusion   Intravenous Continuous Lennox Pearson MD        potassium chloride (KLOR-CON M) extended release tablet 40 mEq  40 mEq Oral PRN Lennox Pearson MD        Or   Claire Epstein potassium bicarb-citric acid (EFFER-K) effervescent tablet 40 mEq  40 mEq Oral PRN Lennox Pearson MD        Or   Claire Epstein potassium chloride 10 mEq/100 mL IVPB (Peripheral Line)  10 mEq Intravenous PRN Lennox Pearson MD        magnesium sulfate 2 g in 50 mL IVPB premix  2 g Intravenous PRN Lennox Pearson MD        nitroGLYCERIN (NITROSTAT) SL tablet 0.4 mg  0.4 mg Sublingual Q5 Min PRN Lennox Pearson MD        morphine injection 4 mg  4 mg Intravenous Q4H PRN Lennox Pearson MD        pantoprazole (PROTONIX) injection 40 mg  40 mg Intravenous Daily Lennox Pearson MD        perflutren lipid microspheres (DEFINITY) injection 1.65 mg  1.5 mL Intravenous ONCE PRN MD Claire Fletcher [START ON 6/27/2020] tiotropium (SPIRIVA RESPIMAT) 2.5 MCG/ACT inhaler 2 puff  2 puff Inhalation Daily Lv Ozuna MD        glycopyrrolate-formoterol (BEVESPI) 9-4.8 MCG/ACT inhaler 2 puff  2 puff Inhalation BID Lv Ozuna MD            Allergies:  Lisinopril     Review of Systems:     · Constitutional: there has been no unanticipated weight loss. There's been no change in energy level, sleep pattern, or activity level. · Eyes: No visual changes or diplopia. No scleral icterus. · ENT: No Headaches, hearing loss or vertigo. No mouth sores or sore throat. · Cardiovascular: Reviewed in HPI  · Respiratory: No cough or wheezing, no sputum production.  No hematemesis. · Gastrointestinal: No abdominal pain, appetite loss, blood in stools. No change in bowel or bladder habits. · Genitourinary: No dysuria, trouble voiding, or hematuria. · Musculoskeletal:  No gait disturbance, weakness or joint complaints. · Integumentary: No rash or pruritis. · Neurological: No headache, diplopia, change in muscle strength, numbness or tingling. No change in gait, balance, coordination, mood, affect, memory, mentation, behavior. · Psychiatric: No anxiety, no depression. · Endocrine: No malaise, fatigue or temperature intolerance. No excessive thirst, fluid intake, or urination. No tremor. · Hematologic/Lymphatic: No abnormal bruising or bleeding, blood clots or swollen lymph nodes. · Allergic/Immunologic: No nasal congestion or hives.   ·     Physical Examination:    Vitals:    06/26/20 1215   BP: (!) 106/57   Pulse: 56   Resp: 11   Temp: 98.4 °F (36.9 °C)   SpO2: 96%    Weight: 157 lb (71.2 kg)         General Appearance:  Alert, cooperative, no distress, appears stated age   Head:  Normocephalic, without obvious abnormality, atraumatic   Eyes:  PERRL, conjunctiva/corneas clear       Nose: Nares normal, no drainage or sinus tenderness   Throat: Lips, mucosa, and tongue normal   Neck: Supple, symmetrical, trachea midline, no adenopathy, thyroid: not enlarged, symmetric, no tenderness/mass/nodules, no carotid bruit or JVD       Lungs:   Clear to auscultation bilaterally, respirations unlabored   Chest Wall:  No tenderness or deformity   Heart:  Regular rate and rhythm, S1, S2 normal, no murmur, rub or gallop   Abdomen:   Soft, non-tender, bowel sounds active all four quadrants,  no masses, no organomegaly           Extremities: Extremities normal, atraumatic, no cyanosis or edema   Pulses: 2+ and symmetric   Skin: Skin color, texture, turgor normal, no rashes or lesions   Pysch: Normal mood and affect   Neurologic: Normal gross motor and sensory exam.         Labs  CBC:   Lab Results   Component Value Date    WBC 5.6 06/26/2020    RBC 4.20 06/26/2020    HGB 13.8 06/26/2020    HCT 40.4 06/26/2020    MCV 96.2 06/26/2020    RDW 15.2 06/26/2020     06/26/2020     CMP:    Lab Results   Component Value Date     06/26/2020    K 3.3 06/26/2020    K 5.7 06/26/2020     06/26/2020    CO2 24 06/26/2020    BUN 14 06/26/2020    CREATININE 0.7 06/26/2020    GFRAA >60 06/26/2020    AGRATIO 0.7 06/26/2020    LABGLOM >60 06/26/2020    GLUCOSE 105 06/26/2020    PROT 7.7 06/26/2020    CALCIUM 8.6 06/26/2020    BILITOT 2.1 06/26/2020    ALKPHOS 281 06/26/2020     06/26/2020     06/26/2020     PT/INR:  No results found for: PTINR  Lab Results   Component Value Date    TROPONINI 0.03 (H) 06/26/2020       EKG:  I have reviewed EKG with the following interpretation:  Impression:  Sinus bradycardia with short PRIncomplete right bundle branch blockT wave abnormality, consider anterolateral ischemiaAbnormal     Echo today   -Normal left ventricle size, wall thickness, and systolic function with an   estimated ejection fraction of 70%.   -No regional wall motion abnormalities are seen.   -Normal diastolic function. Avg. E/e'=11.35       Pt has CAD with following history:  CABG: (date/details),   Valve replacement (date/details)   GXT/Myoview/Lexiscan: (date)  (results)   Stress/echo: (date) (result)   CATH/PCI:  (date)- (results)  - (# and type of stents) -   ECHO: (2016) results EF    %. SUMMARY:    1. Left ventricle: The cavity size was normal. Wall thickness was     normal. Systolic function was normal. The estimated ejection     fraction was in the range of 55% to 60%. Wall motion was normal;     there were no regional wall motion abnormalities. 2. Aortic valve: There was trivial regurgitation. 3. Right ventricle:  The cavity size was normal. Wall thickness was     normal. Systolic function was normal.  ADEEL (date) (results)  EP procedures/studies/ablation:  (specific data). Device information:  Event monitor: (date/results)    All testing and labs listed below were personally reviewed. Assessment  Patient Active Problem List   Diagnosis    Troponin level elevated    Abdominal pain    [facility]STEMI (non-ST elevated myocardial infarction) ([facility]    Transaminitis    CAD (coronary artery disease)    COPD (chronic obstructive pulmonary disease) ([facility] [address] [facility]    HTN (hypertension)    Hyperkalemia    Hypokalemia    [facility]    Alcohol abuse    Suspected COVID-19 virus infection    Abnormal EKG         Plan:    I had the opportunity to review the clinical symptoms and presentation of Juan Janelsean. Assessment/Plan:  Principal Problem: Active Problems:    Troponin level elevated  Plan: Due to acute medical illness from pancreatitis, hepatitis/liver failure. Normal Echo. Abnormal ECG but No angina. Likely demand ischemia. Type 2 MI. Recommend ischemic evaluation with Nuc stress in 4-6 weeks after recovery from acute GI illness. OK to discontinue lovenox. There is no ACS. HTN (hypertension)  Plan: well controlled, cont lopressor, losartan. Will sign off. Call with questions. FU with cardiology as outpatient. I will address the patient's cardiac risk factors and adjusted pharmacologic treatment as needed. In addition, I have reinforced the need for patient directed risk factor modification. Tobacco use was discussed with the patient and educated on the negative effects. I have asked the patient to not utilize these agents. Thank you for allowing to us to participate in the care or Juan Gill. Further evaluation will be based upon the patient's clinical course and testing results. All questions and concerns were addressed to the patient/family. Alternatives to my treatment were discussed. The note was completed using EMR. Every effort was made to ensure accuracy; however, inadvertent computerized transcription errors may be present.     Marisela Camargo, MD 6/26/2020 2:03 PM

## 2020-06-26 NOTE — ED PROVIDER NOTES
eMERGENCY dEPARTMENT eNCOUnter      PtName: Kinjal Perez  MRN: [id number]  [place] 1955  Date of evaluation: 6/25/2020  Provider: Leanne Panda DO     CHIEF COMPLAINT       Chief Complaint   Patient presents with    Abdominal Pain     Pt states has a uti, antibiotics, and has hx of high liver enzymes and pancreas issues. Pt states abdominal pain x months, worse today and patient states feels like her equilibrium is off as well. HISTORY OF PRESENT ILLNESS   (Location/Symptom, Timing/Onset,Context/Setting, Quality, Duration, Modifying Factors, Severity) Note limiting factors. HPI    Kinjal Perez is a 76 y.o. female who presents to the emergency department with chief complaint of generalized abdominal pain for months. No chest pain or shortness of breath. Initially in the triage complaint was mention of having a UTI and feeling generally weak. No chest pain or shortness of breath. Abdominal pain is generalized, crampy, 5/10 without radiation. Some nausea but no vomiting or diarrhea. Nursing Notes were reviewed. REVIEW OF SYSTEMS    (2+ forlevel 4; 10+ for level 5)     Review of Systems  See hpi for further details. Complete 10 point review of all systems performed and is otherwise negative unless noted above. PAST MEDICAL HISTORY   History reviewed. No pertinent past medical history. SURGICAL HISTORY     History reviewed. No pertinent surgical history.     CURRENT MEDICATIONS       Previous Medications    ACLIDINIUM (TUDORZA PRESSAIR) 400 MCG/ACT AEPB INHALER    Inhale 1 puff into the lungs 2 times daily    ALBUTEROL SULFATE HFA (PROAIR HFA) 108 (90 BASE) MCG/ACT INHALER    Inhale 2 puffs into the lungs every 6 hours as needed for Wheezing    ASPIRIN 81 MG CHEWABLE TABLET    Take 81 mg by mouth daily    ATORVASTATIN (LIPITOR) 80 MG TABLET    Take 80 mg by mouth daily    CEPHALEXIN (KEFLEX) 500 MG CAPSULE    Take 500 mg by mouth 3 times daily    GABAPENTIN (NEURONTIN) 300 MG CAPSULE    Take 300 mg by mouth 3 times daily. HYDROCHLOROTHIAZIDE (HYDRODIURIL) 25 MG TABLET    Take 25 mg by mouth daily    HYDROCODONE-ACETAMINOPHEN (NORCO) 7.5-325 MG PER TABLET    Take 1 tablet by mouth every 6 hours as needed for Pain. IBUPROFEN (ADVIL;MOTRIN) 800 MG TABLET    Take 800 mg by mouth every 8 hours as needed for Pain    IPRATROPIUM-ALBUTEROL (DUONEB) 0.5-2.5 (3) MG/3ML SOLN NEBULIZER SOLUTION    Inhale 1 vial into the lungs every 4 hours    LOSARTAN (COZAAR) 100 MG TABLET    Take 100 mg by mouth daily    METOPROLOL TARTRATE (LOPRESSOR) 25 MG TABLET    Take 25 mg by mouth 2 times daily    SUMATRIPTAN (IMITREX) 20 MG/ACT NASAL SPRAY    1 spray by Nasal route daily as needed for Migraine    UMECLIDINIUM-VILANTEROL (ANORO ELLIPTA) 62.5-25 MCG/INH AEPB INHALER    Inhale 1 puff into the lungs daily       ALLERGIES     Lisinopril    FAMILY HISTORY     History reviewed. No pertinent family history.        SOCIAL HISTORY       Social History     Socioeconomic History    Marital status: Single     Spouse name: None    Number of children: None    Years of education: None    Highest education level: None   Occupational History    None   Social Needs    Financial resource strain: None    Food insecurity     Worry: None     Inability: None    Transportation needs     Medical: None     Non-medical: None   Tobacco Use    Smoking status: Never Smoker    Smokeless tobacco: Never Used   Substance and Sexual Activity    Alcohol use: Not Currently    Drug use: Never    Sexual activity: None   Lifestyle    Physical activity     Days per week: None     Minutes per session: None    Stress: None   Relationships    Social connections     Talks on phone: None     Gets together: None     Attends Latter-day service: None     Active member of club or organization: None     Attends meetings of clubs or organizations: None     Relationship status: None    Intimate partner violence     Fear of current or ex swelling, tenderness or deformity. Right lower leg: No edema. Left lower leg: No edema. Skin:     General: Skin is warm and dry. Capillary Refill: Capillary refill takes less than 2 seconds. Coloration: Skin is not jaundiced or pale. Findings: No bruising, erythema or rash. Neurological:      General: No focal deficit present. Mental Status: She is alert and oriented to person, place, and time. Cranial Nerves: No cranial nerve deficit. Sensory: No sensory deficit. Motor: No weakness. Psychiatric:         Mood and Affect: Mood normal.         Behavior: Behavior normal.         Thought Content: Thought content normal.         Judgment: Judgment normal.         DIAGNOSTIC RESULTS     EKG (Per Emergency Physician):   Patient by ED physician: Normal axis, sinus pericardia 59 bpm with anterior lateral T wave inversions and biphasic T waves inferiorly. No old EKG to compare this to. RADIOLOGY (Per Emergency Physician): Interpretation per the Radiologist below, if available at the time of this note:  Ct Abdomen Pelvis Wo Contrast Additional Contrast? None    Result Date: 6/26/2020  EXAMINATION: CT OF THE ABDOMEN AND PELVIS WITHOUT CONTRAST 6/26/2020 12:53 am TECHNIQUE: CT of the abdomen and pelvis was performed without the administration of intravenous contrast. Multiplanar reformatted images are provided for review. Dose modulation, iterative reconstruction, and/or weight based adjustment of the mA/kV was utilized to reduce the radiation dose to as low as reasonably achievable. COMPARISON: None. HISTORY: Chronic abdominal pain for months, worse today. Patient has UTI and is on antibiotics. FINDINGS: Lower Chest: Unremarkable. Organs: The liver appears cirrhotic with possible fibrosis. Spleen is not enlarged. Calcifications within the pancreatic head and uncinate process could relate to chronic pancreatitis. Adrenals and kidneys are unremarkable.  The gallbladder is contracted. GI/Bowel: Normal appendix. Remainder of the gastrointestinal tract is unremarkable. Pelvis: Bladder and uterus are unremarkable. No lymphadenopathy or free fluid. Peritoneum/Retroperitoneum: No lymphadenopathy or ascites. Severe atherosclerotic disease without abdominal aortic aneurysm. Bones/Soft Tissues: Osteopenia. No acute abnormality. The liver appears cirrhotic and possibly fibrotic. This can be further evaluated with nonemergent MRI. Xr Chest Portable    Result Date: 6/25/2020  EXAMINATION: ONE XRAY VIEW OF THE CHEST 6/25/2020 9:45 pm COMPARISON: None available HISTORY: ORDERING SYSTEM PROVIDED HISTORY: CP TECHNOLOGIST PROVIDED HISTORY: Reason for exam:->CP Reason for Exam: Pt states has a uti, antibiotics, and has hx of high liver enzymes and pancreas issues. Pt states abdominal pain x months, worse today and patient states feels like her equilibrium is off as well Acuity: Chronic Type of Exam: Ongoing FINDINGS: The lungs appear somewhat hyperinflated. Hyperlucency of upper lungs is evident. Bibasilar increased opacity could be related to bronchovascular crowding. There is asymmetric right mid lung opacity suspicious for pneumonia. No effusion or edema. Hyperplasia lungs. Bibasilar increased density could be related to bronchovascular padding. Pneumonia could be considered, and particularly given asymmetric right mid lung increased opacity.        LABS:  Labs Reviewed   PROTIME-INR - Abnormal; Notable for the following components:       Result Value    Protime 14.2 (*)     INR 1.22 (*)     All other components within normal limits    Narrative:     Performed at:  [facility] [address], [place], [address]   Phone [phone]   URINE RT REFLEX TO CULTURE - Abnormal; Notable for the following components:    Bilirubin Urine SMALL (*)     Blood, Urine LARGE (*)     Protein,  (*)     Urobilinogen, Urine 4.0 (*)     Leukocyte Esterase, Urine SMALL (*)     All other components within normal limits    Narrative:     Performed at:  [facility] [address] [facility], [facility] [employer]   Phone [phone]   LIPASE - Abnormal; Notable for the following components:    Lipase 82.0 (*)     All other components within normal limits    Narrative:     Performed at:  [facility] [address] [employer], [facility] [employer]   Phone [phone]   MICROSCOPIC URINALYSIS - Abnormal; Notable for the following components:    Hyaline Casts, UA 3-5 (*)     WBC, UA 9 (*)     All other components within normal limits    Narrative:     Performed at:  [facility] [address] [facility], [facility] [employer]   Phone [phone]   COMPREHENSIVE METABOLIC PANEL W/ REFLEX TO MG FOR LOW K - Abnormal; Notable for the following components:    Sodium 131 (*)     Potassium reflex Magnesium 5.7 (*)     Alb 2.8 (*)     Albumin/Globulin Ratio 0.6 (*)     Total Bilirubin 1.9 (*)     Alkaline Phosphatase 245 (*)      (*)      (*)     All other components within normal limits    Narrative:     Performed at:  [facility] [address] [facility], [facility] [employer]   Phone [phone]   TROPONIN - Abnormal; Notable for the following components:    Troponin 0.03 (*)     All other components within normal limits    Narrative:     Performed at:  [facility] [address] [facility]  [place], [address] [employer]   Phone [phone]   COMPREHENSIVE METABOLIC PANEL - Abnormal; Notable for the following components:    Sodium 134 (*)     Potassium 3.3 (*)     Glucose 105 (*)     Alb 3.1 (*)     Albumin/Globulin Ratio 0.7 (*)     Total Bilirubin 2.1 (*)     Alkaline Phosphatase 281 (*)      (*)      (*)     All other components within normal limits    Narrative:     Performed at:  [facility]  [address] [facility], [address] [facility]   Phone [phone]   TROPONIN - Abnormal; Notable for the following components:    Troponin 0.05 (*)     All other components within normal limits    Narrative:     Performed at:  [facility]  [address] [facility], [address] [facility]   Phone [phone]   CBC WITH AUTO DIFFERENTIAL    Narrative:     Performed at:  [facility]  [address] [facility], [address] [facility]   Phone [phone]   SPECIMEN REJECTION    Narrative:     Performed at:  [facility]  [address] [facility], [address] [facility]   Phone [phone]       All other labs were within normal range or not returned as of this dictation. EMERGENCY DEPARTMENT COURSE and DIFFERENTIAL DIAGNOSIS/MDM:   Vitals:    Vitals:    06/26/20 0130 06/26/20 0200 06/26/20 0230 06/26/20 0300   BP: (!) 100/53 (!) 91/42 (!) 98/48 (!) 105/50   Pulse: 67 64 61 66   Resp:       Temp:       TempSrc:       SpO2: 96% 97% 95% 96%   Weight:       Height:           Medications   0.9 % sodium chloride bolus (0 mLs Intravenous Stopped 6/26/20 0315)   dicyclomine (BENTYL) capsule 20 mg (20 mg Oral Given 6/26/20 0045)   ondansetron (ZOFRAN) injection 4 mg (4 mg Intravenous Given 6/26/20 0045)   potassium chloride (KLOR-CON M) extended release tablet 40 mEq (40 mEq Oral Given 6/26/20 0316)   aspirin chewable tablet 324 mg (324 mg Oral Given 6/26/20 0316)       MDM. As initial triage complaint was generalized weakness and abdominal pain cardiac work-up and abdominal labs were ordered. Initially the CMP was hemolyzed so this was repeated. Potassium is low-this will be repleted. Initially the troponin was hemolyzed and was elevated 0.03. Repeat troponin nonhemolyzed is 0.05. Case discussed with hospitalist for admission. Request COVID screen. Aspirin ordered.     CONSULTS:  IP CONSULT TO HOSPITALIST    PROCEDURES:  Unless otherwise noted below, none     Procedures    FINAL IMPRESSION      1. Elevated troponin    2. Generalized abdominal pain    3. Cirrhosis of liver without ascites, unspecified hepatic cirrhosis type ([facility] [address]    4. Transaminitis    5. Elevated lipase    6. EKG abnormalities          DISPOSITION/PLAN   DISPOSITION Decision To Discharge 06/26/2020 02:46:34 AM      PATIENT REFERRED TO:  MD Anali Pagan[address] [address]    Schedule an appointment as soon as possible for a visit       Baldemar Fergusoney, [address] [place] [address], [address] [place] [address],[address]  [address] [facility]  [phone]    Schedule an appointment as soon as possible for a visit       [facility] Emergency Department  [address] [facility]  [phone]    If symptoms worsen      DISCHARGE MEDICATIONS:  New Prescriptions    DICYCLOMINE (BENTYL) 10 MG CAPSULE    Take 2 capsules by mouth every 6 hours as needed (cramps)    ONDANSETRON (ZOFRAN) 4 MG TABLET    Take 1 tablet by mouth every 8 hours as needed for Nausea          (Please note:  Portions of this note were completed with a voice recognition program. Efforts were made to edit the dictations but occasionally words and phrases are mis-transcribed.)    Form v2016. J.[id number]    Araceli Roper DO (electronically signed)  Emergency Medicine Provider              Berto Ferrell DO  06/26/20 4359

## 2020-06-26 NOTE — CONSULTS
Peritoneum/Retroperitoneum: No lymphadenopathy or ascites.  Severe   atherosclerotic disease without abdominal aortic aneurysm.       Bones/Soft Tissues: Osteopenia. Impression   No acute abnormality.  The liver appears cirrhotic and possibly fibrotic. This can be further evaluated with nonemergent MRI. Assessment:     Principal Problem:    Abdominal pain  Active Problems:    Troponin level elevated    NSTEMI (non-ST elevated myocardial infarction) (HCC)    Transaminitis    CAD (coronary artery disease)    COPD (chronic obstructive pulmonary disease) (HCC)    HTN (hypertension)    Hyperkalemia    Hypokalemia    Cirrhosis (HCC)    Alcohol abuse    Suspected COVID-19 virus infection    Abnormal EKG  Resolved Problems:    * No resolved hospital problems. *    Upper abdominal pain, elevated liver enzymes - episodic pain x3-4 months that occurs mostly at night. This along with elevated liver enzymes is concerning for gallstones. CT with chronic pancreatitis and no evidence of acute pancreatitis although it was a noncontrast study. Lipase only 82. Acute pancreatitis wouldn't cause episodic pain over months. Transaminases in the 600-700's which is higher than what is seen with alcohol. Need to eval for gallstones. Consider viral (Covid 19 pending) or medication source of elevated liver enzymes. Neg Hep A/B/C at [facility]. Ischemic hepatitis is possible as well. BP was as low as 91/42 here. Chronic pancreatitis - CT with pancreatic calcifications c/w chronic pancreatitis. Likely from prior alcohol and tobacco use. Cirrhosis - per CT. New dx for patient. Likely from prior alcohol abuse. No ascites on CT. No GI bleeding. No confusion.      Recommendations:   - follow liver enzymes  - f/u RUQ US to eval for gallstones or biliary dilation  - f/u covid 19 test  - will need outpatient EGD for variceal screening  - check Hep A total AB and Hep B surface AB for vaccination purposes   - complete alcohol cessation. Discussed with Dr. Avi Leal, [address]    I have personally performed a face to face diagnostic evaluation on this patient. I have interviewed and examined the patient and I agree with the findings and recommended plan of care. In summary, my findings and plan are the following: pt with significant in transaminase elevations. No obvious offending meds.  Will check DIAZ, F actin etc.       Justo Soria MD  [address] [facility] [address]

## 2020-06-27 LAB
ALBUMIN SERPL-MCNC: 2.5 G/DL (ref 3.4–5)
ALP BLD-CCNC: 212 U/L (ref 40–129)
ALT SERPL-CCNC: 442 U/L (ref 10–40)
ANION GAP SERPL CALCULATED.3IONS-SCNC: 6 MMOL/L (ref 3–16)
AST SERPL-CCNC: 505 U/L (ref 15–37)
BASOPHILS ABSOLUTE: 0 K/UL (ref 0–0.2)
BASOPHILS RELATIVE PERCENT: 0.6 %
BILIRUB SERPL-MCNC: 1.5 MG/DL (ref 0–1)
BILIRUBIN DIRECT: 0.8 MG/DL (ref 0–0.3)
BILIRUBIN, INDIRECT: 0.7 MG/DL (ref 0–1)
BUN BLDV-MCNC: 14 MG/DL (ref 7–20)
CALCIUM SERPL-MCNC: 8.1 MG/DL (ref 8.3–10.6)
CHLORIDE BLD-SCNC: 107 MMOL/L (ref 99–110)
CO2: 24 MMOL/L (ref 21–32)
CREAT SERPL-MCNC: 0.8 MG/DL (ref 0.6–1.2)
EOSINOPHILS ABSOLUTE: 0.1 K/UL (ref 0–0.6)
EOSINOPHILS RELATIVE PERCENT: 2.2 %
GFR AFRICAN AMERICAN: >60
GFR NON-AFRICAN AMERICAN: >60
GLUCOSE BLD-MCNC: 114 MG/DL (ref 70–99)
HAV IGM SER IA-ACNC: NORMAL
HBV SURFACE AB TITR SER: <3.5 MIU/ML
HCT VFR BLD CALC: 32.7 % (ref 36–48)
HEMOGLOBIN: 11 G/DL (ref 12–16)
HEPATITIS B CORE IGM ANTIBODY: NORMAL
HEPATITIS B SURFACE ANTIGEN INTERPRETATION: NORMAL
HEPATITIS C ANTIBODY INTERPRETATION: NORMAL
LIPASE: 89 U/L (ref 13–60)
LYMPHOCYTES ABSOLUTE: 2.4 K/UL (ref 1–5.1)
LYMPHOCYTES RELATIVE PERCENT: 47.2 %
MCH RBC QN AUTO: 32.7 PG (ref 26–34)
MCHC RBC AUTO-ENTMCNC: 33.6 G/DL (ref 31–36)
MCV RBC AUTO: 97.6 FL (ref 80–100)
MONOCYTES ABSOLUTE: 1 K/UL (ref 0–1.3)
MONOCYTES RELATIVE PERCENT: 19.8 %
NEUTROPHILS ABSOLUTE: 1.5 K/UL (ref 1.7–7.7)
NEUTROPHILS RELATIVE PERCENT: 30.2 %
PDW BLD-RTO: 15.5 % (ref 12.4–15.4)
PLATELET # BLD: 160 K/UL (ref 135–450)
PMV BLD AUTO: 7.8 FL (ref 5–10.5)
POTASSIUM REFLEX MAGNESIUM: 4.2 MMOL/L (ref 3.5–5.1)
RBC # BLD: 3.35 M/UL (ref 4–5.2)
SODIUM BLD-SCNC: 137 MMOL/L (ref 136–145)
TOTAL PROTEIN: 6.1 G/DL (ref 6.4–8.2)
TROPONIN: 0.06 NG/ML
WBC # BLD: 5 K/UL (ref 4–11)

## 2020-06-27 PROCEDURE — 6360000002 HC RX W HCPCS: Performed by: INTERNAL MEDICINE

## 2020-06-27 PROCEDURE — 83690 ASSAY OF LIPASE: CPT

## 2020-06-27 PROCEDURE — 6370000000 HC RX 637 (ALT 250 FOR IP)

## 2020-06-27 PROCEDURE — 94640 AIRWAY INHALATION TREATMENT: CPT

## 2020-06-27 PROCEDURE — 80048 BASIC METABOLIC PNL TOTAL CA: CPT

## 2020-06-27 PROCEDURE — 6370000000 HC RX 637 (ALT 250 FOR IP): Performed by: INTERNAL MEDICINE

## 2020-06-27 PROCEDURE — 85025 COMPLETE CBC W/AUTO DIFF WBC: CPT

## 2020-06-27 PROCEDURE — 80076 HEPATIC FUNCTION PANEL: CPT

## 2020-06-27 PROCEDURE — 94761 N-INVAS EAR/PLS OXIMETRY MLT: CPT

## 2020-06-27 PROCEDURE — C9113 INJ PANTOPRAZOLE SODIUM, VIA: HCPCS | Performed by: INTERNAL MEDICINE

## 2020-06-27 PROCEDURE — 36415 COLL VENOUS BLD VENIPUNCTURE: CPT

## 2020-06-27 PROCEDURE — 1200000000 HC SEMI PRIVATE

## 2020-06-27 PROCEDURE — 2580000003 HC RX 258: Performed by: INTERNAL MEDICINE

## 2020-06-27 RX ORDER — FOLIC ACID 1 MG/1
1 TABLET ORAL DAILY
Status: DISCONTINUED | OUTPATIENT
Start: 2020-06-27 | End: 2020-07-03 | Stop reason: HOSPADM

## 2020-06-27 RX ORDER — MULTIVITAMIN WITH IRON
1 TABLET ORAL DAILY
Status: DISCONTINUED | OUTPATIENT
Start: 2020-06-27 | End: 2020-07-03 | Stop reason: HOSPADM

## 2020-06-27 RX ORDER — THIAMINE MONONITRATE (VIT B1) 100 MG
100 TABLET ORAL DAILY
Status: DISCONTINUED | OUTPATIENT
Start: 2020-06-27 | End: 2020-07-03 | Stop reason: HOSPADM

## 2020-06-27 RX ORDER — MAGNESIUM HYDROXIDE/ALUMINUM HYDROXICE/SIMETHICONE 120; 1200; 1200 MG/30ML; MG/30ML; MG/30ML
SUSPENSION ORAL
Status: COMPLETED
Start: 2020-06-27 | End: 2020-06-27

## 2020-06-27 RX ORDER — PANTOPRAZOLE SODIUM 40 MG/1
40 TABLET, DELAYED RELEASE ORAL
Status: DISCONTINUED | OUTPATIENT
Start: 2020-06-27 | End: 2020-07-03 | Stop reason: HOSPADM

## 2020-06-27 RX ORDER — MAGNESIUM HYDROXIDE/ALUMINUM HYDROXICE/SIMETHICONE 120; 1200; 1200 MG/30ML; MG/30ML; MG/30ML
30 SUSPENSION ORAL EVERY 6 HOURS PRN
Status: DISCONTINUED | OUTPATIENT
Start: 2020-06-27 | End: 2020-07-03 | Stop reason: HOSPADM

## 2020-06-27 RX ADMIN — THERA TABS 1 TABLET: TAB at 14:39

## 2020-06-27 RX ADMIN — ACETAMINOPHEN 650 MG: 325 TABLET, FILM COATED ORAL at 01:16

## 2020-06-27 RX ADMIN — ALUMINUM HYDROXIDE, MAGNESIUM HYDROXIDE, AND SIMETHICONE 30 ML: 200; 200; 20 SUSPENSION ORAL at 22:34

## 2020-06-27 RX ADMIN — GLYCOPYRROLATE AND FORMOTEROL FUMARATE 2 PUFF: 9; 4.8 AEROSOL, METERED RESPIRATORY (INHALATION) at 19:56

## 2020-06-27 RX ADMIN — GLYCOPYRROLATE AND FORMOTEROL FUMARATE 2 PUFF: 9; 4.8 AEROSOL, METERED RESPIRATORY (INHALATION) at 09:53

## 2020-06-27 RX ADMIN — PANTOPRAZOLE SODIUM 40 MG: 40 INJECTION, POWDER, FOR SOLUTION INTRAVENOUS at 09:58

## 2020-06-27 RX ADMIN — FOLIC ACID 1 MG: 1 TABLET ORAL at 14:39

## 2020-06-27 RX ADMIN — TIOTROPIUM BROMIDE INHALATION SPRAY 2 PUFF: 3.12 SPRAY, METERED RESPIRATORY (INHALATION) at 09:52

## 2020-06-27 RX ADMIN — ENOXAPARIN SODIUM 70 MG: 80 INJECTION SUBCUTANEOUS at 21:38

## 2020-06-27 RX ADMIN — MAGNESIUM HYDROXIDE/ALUMINUM HYDROXICE/SIMETHICONE 30 ML: 120; 1200; 1200 SUSPENSION ORAL at 22:34

## 2020-06-27 RX ADMIN — Medication 100 MG: at 14:38

## 2020-06-27 RX ADMIN — Medication 10 ML: at 09:59

## 2020-06-27 RX ADMIN — SODIUM CHLORIDE, POTASSIUM CHLORIDE, SODIUM LACTATE AND CALCIUM CHLORIDE: 600; 310; 30; 20 INJECTION, SOLUTION INTRAVENOUS at 13:53

## 2020-06-27 RX ADMIN — GABAPENTIN 300 MG: 300 CAPSULE ORAL at 09:58

## 2020-06-27 RX ADMIN — PANTOPRAZOLE SODIUM 40 MG: 40 TABLET, DELAYED RELEASE ORAL at 17:22

## 2020-06-27 RX ADMIN — SODIUM CHLORIDE, POTASSIUM CHLORIDE, SODIUM LACTATE AND CALCIUM CHLORIDE: 600; 310; 30; 20 INJECTION, SOLUTION INTRAVENOUS at 05:37

## 2020-06-27 RX ADMIN — GABAPENTIN 300 MG: 300 CAPSULE ORAL at 14:39

## 2020-06-27 RX ADMIN — GABAPENTIN 300 MG: 300 CAPSULE ORAL at 21:37

## 2020-06-27 RX ADMIN — ATORVASTATIN CALCIUM 80 MG: 80 TABLET, FILM COATED ORAL at 09:58

## 2020-06-27 RX ADMIN — SODIUM CHLORIDE, POTASSIUM CHLORIDE, SODIUM LACTATE AND CALCIUM CHLORIDE: 600; 310; 30; 20 INJECTION, SOLUTION INTRAVENOUS at 21:38

## 2020-06-27 RX ADMIN — ASPIRIN 81 MG 81 MG: 81 TABLET ORAL at 09:58

## 2020-06-27 RX ADMIN — ACETAMINOPHEN 650 MG: 325 TABLET, FILM COATED ORAL at 21:37

## 2020-06-27 RX ADMIN — ENOXAPARIN SODIUM 70 MG: 80 INJECTION SUBCUTANEOUS at 09:58

## 2020-06-27 ASSESSMENT — PAIN SCALES - GENERAL
PAINLEVEL_OUTOF10: 0
PAINLEVEL_OUTOF10: 3
PAINLEVEL_OUTOF10: 3
PAINLEVEL_OUTOF10: 2
PAINLEVEL_OUTOF10: 3
PAINLEVEL_OUTOF10: 0

## 2020-06-27 ASSESSMENT — PAIN DESCRIPTION - PAIN TYPE
TYPE: ACUTE PAIN
TYPE: ACUTE PAIN
TYPE_2: ACUTE PAIN
TYPE_2: ACUTE PAIN
TYPE: ACUTE PAIN

## 2020-06-27 ASSESSMENT — PAIN DESCRIPTION - PROGRESSION
CLINICAL_PROGRESSION: NOT CHANGED
CLINICAL_PROGRESSION_2: GRADUALLY IMPROVING

## 2020-06-27 ASSESSMENT — PAIN DESCRIPTION - LOCATION
LOCATION: ABDOMEN
LOCATION_2: CHEST
LOCATION_2: CHEST
LOCATION: ABDOMEN

## 2020-06-27 ASSESSMENT — PAIN DESCRIPTION - FREQUENCY
FREQUENCY: CONTINUOUS
FREQUENCY: CONTINUOUS

## 2020-06-27 ASSESSMENT — PAIN DESCRIPTION - ORIENTATION
ORIENTATION_2: ANTERIOR
ORIENTATION_2: ANTERIOR
ORIENTATION: UPPER
ORIENTATION: LOWER;MID;LEFT;RIGHT
ORIENTATION: UPPER;LEFT;RIGHT;MID
ORIENTATION: LOWER;MID;LEFT;RIGHT

## 2020-06-27 ASSESSMENT — PAIN DESCRIPTION - ONSET
ONSET: ON-GOING
ONSET: ON-GOING
ONSET_2: ON-GOING

## 2020-06-27 ASSESSMENT — PAIN DESCRIPTION - DESCRIPTORS
DESCRIPTORS_2: ACHING;DULL
DESCRIPTORS_2: ACHING;DULL

## 2020-06-27 ASSESSMENT — PAIN DESCRIPTION - DURATION
DURATION_2: CONTINUOUS
DURATION_2: INTERMITTENT

## 2020-06-27 ASSESSMENT — PAIN DESCRIPTION - INTENSITY
RATING_2: 1
RATING_2: 2

## 2020-06-27 NOTE — PROGRESS NOTES
[facility]    6/27/2020 1:57 PM        Name: Carrington Brunson . Admitted: 6/25/2020  Primary Care Provider: Dannie Mays MD (Tel: [phone])    Brief Course:  76 y.o. female with history of alcoholic pancreatitis, CAD s/p stents, COPD, HTN, h/o alcohol abuse came to ER with complaints of abdominal pain. Admitted as inpatient for abdominal pain. Kept NPO, started on IVF and PPI. Followed by GI for transaminitis. COVID negative on 6/26. CC: Ab Pain    Subjective:  . Patient with less abdominal pain. Has appetite. Less GERD. No CP, SOB, HA or fevers. No diarrhea or melena.     Reviewed interval ancillary notes    Current Medications  aspirin chewable tablet 81 mg, Daily  atorvastatin (LIPITOR) tablet 80 mg, Daily  gabapentin (NEURONTIN) capsule 300 mg, TID  sodium chloride flush 0.9 % injection 10 mL, 2 times per day  sodium chloride flush 0.9 % injection 10 mL, PRN  acetaminophen (TYLENOL) tablet 650 mg, Q6H PRN    Or  acetaminophen (TYLENOL) suppository 650 mg, Q6H PRN  polyethylene glycol (GLYCOLAX) packet 17 g, Daily PRN  promethazine (PHENERGAN) tablet 12.5 mg, Q6H PRN    Or  ondansetron (ZOFRAN) injection 4 mg, Q6H PRN  enoxaparin (LOVENOX) injection 70 mg, BID  lactated ringers infusion, Continuous  potassium chloride (KLOR-CON M) extended release tablet 40 mEq, PRN    Or  potassium bicarb-citric acid (EFFER-K) effervescent tablet 40 mEq, PRN    Or  potassium chloride 10 mEq/100 mL IVPB (Peripheral Line), PRN  magnesium sulfate 2 g in 50 mL IVPB premix, PRN  nitroGLYCERIN (NITROSTAT) SL tablet 0.4 mg, Q5 Min PRN  morphine injection 4 mg, Q4H PRN  pantoprazole (PROTONIX) injection 40 mg, Daily  perflutren lipid microspheres (DEFINITY) injection 1.65 mg, ONCE PRN  tiotropium (SPIRIVA RESPIMAT) 2.5 MCG/ACT inhaler 2 puff, Daily  glycopyrrolate-formoterol (BEVESPI) 9-4.8 MCG/ACT inhaler 2 puff, BID        Objective:  BP (!) 88/49   Pulse 55   Temp 97.3 °F (36.3 °C) (Temporal)   Resp 16   Ht 5' 6\" (1.676 m)   Wt 145 lb 8.1 oz (66 kg)   SpO2 96%   BMI 23.48 kg/m²     Intake/Output Summary (Last 24 hours) at 6/27/2020 1357  Last data filed at 6/27/2020 6443  Gross per 24 hour   Intake 2496 ml   Output --   Net 2496 ml      Wt Readings from Last 3 Encounters:   06/27/20 145 lb 8.1 oz (66 kg)       General appearance:  Appears comfortable. Well nourished  Eyes: Sclera clear, pupils equal  ENT: Moist mucus membranes, no thrush. Trachea midline. Cardiovascular: Regular rhythm, normal S1, S2. No murmur, gallop, rub. No edema in lower extremities  Respiratory: Clear to auscultation bilaterally, no wheeze, good inspiratory effort  Gastrointestinal: Abdomen soft, improving epigastric/LUQ tenderness, not distended, normal bowel sounds  Musculoskeletal: No cyanosis in digits, neck supple  Neurology: Grossly intact. Alert and oriented in time, place and person. No speech or motor deficits  Psychiatry: Appropriate affect. Not agitated  Skin: Warm, dry, normal turgor, no rash  Brisk capillary refill, peripheral pulses palpable       Labs and Tests:  CBC:   Recent Labs     06/26/20  0004 06/27/20  0421   WBC 5.6 5.0   HGB 13.8 11.0*    160     BMP:    Recent Labs     06/26/20  0201 06/26/20  1454 06/27/20  0421   * 136 137   K 3.3* 4.0 4.2    105 107   CO2 24 25 24   BUN 14 15 14   CREATININE 0.7 0.8 0.8   GLUCOSE 105* 78 114*     Hepatic:   Recent Labs     06/26/20  0109 06/26/20  0201 06/27/20  0421   * 723* 505*   * 606* 442*   BILITOT 1.9* 2.1* 1.5*   ALKPHOS 245* 281* 212*     US ABDOMEN LIMITED Specify organ? LIVER, PANCREAS, GALLBLADDER   Final Result   No cholelithiasis or ancillary evidence of acute cholecystitis. Heterogeneous hepatic echotexture, which can be in keeping with fatty liver   or underlying hepatocellular disease.          CT ABDOMEN PELVIS WO CONTRAST Additional Contrast? None   Final Result   No acute abnormality. The liver appears cirrhotic and possibly fibrotic. This can be further evaluated with nonemergent MRI. XR CHEST PORTABLE   Final Result   Hyperplasia lungs. Bibasilar increased density could be related to bronchovascular padding. Pneumonia could be considered, and particularly given asymmetric right mid   lung increased opacity. Problem List  Principal Problem:    Abdominal pain  Active Problems:    Troponin level elevated    Transaminitis    CAD (coronary artery disease)    COPD (chronic obstructive pulmonary disease) (HCC)    HTN (hypertension)    Hyperkalemia    Hypokalemia    Cirrhosis (HCC)    Alcohol abuse    Suspected COVID-19 virus infection    Abnormal EKG  Resolved Problems:    * No resolved hospital problems. *       Assessment & Plan:   1.         DC droplet plus isolation as COVID ruled out  2. Low fat diet per GI  3. Cont IVF today  4. Lovenox bid until ok with Cardiology  5. Cont ASA, Lipitor, Lopressor, and Cozaar  6. Neg RUQ US gallstone  7. Normal TG  8. Stopped HCTZ as this is rare cause of pancreatitis  9. Morphine IV PRN   10. Serial troponin negative  11. Cardiology consult for NSTEMI still pending this morning  12. GI consult for abdominal pain appreciated  13. Telemetry  14. Neg Ethanol level  15. MVI, FA and Thiamine added  16. Echo with EF 70%  17. Cont Spiriva and inhalers from home       IV Access: Peripheral  Paredes: No  Diet: DIET LOW FAT;  Code:Full Code  DVT PPX Lovenox bid  Disposition Home    Discussed with patient, Dr Bee Alvarado (GI) and nursing. Awaiting Cardiology input. ? Stress vs home tomorrow if stable and OP f/u.     Lennox Pearson MD   6/27/2020 1:57 PM

## 2020-06-27 NOTE — PROGRESS NOTES
[facility] GI  Gastroenterology Progress Note    Apple Infante is a 76 y.o. female patient. Principal Problem:    Abdominal pain  Active Problems:    Troponin level elevated    Transaminitis    CAD (coronary artery disease)    COPD (chronic obstructive pulmonary disease) (HCC)    HTN (hypertension)    Hyperkalemia    Hypokalemia    Cirrhosis (HCC)    Alcohol abuse    Suspected COVID-19 virus infection    Abnormal EKG  Resolved Problems:    * No resolved hospital problems. *      SUBJECTIVE:  Feels pretty good. ROS:  Denies cp, sob, n/v.       Physical    VITALS:  BP (!) 88/49   Pulse 55   Temp 97.3 °F (36.3 °C) (Temporal)   Resp 14   Ht 5' 6\" (1.676 m)   Wt 145 lb 8.1 oz (66 kg)   SpO2 95%   BMI 23.48 kg/m²   TEMPERATURE:  Current - Temp: 97.3 °F (36.3 °C); Max - Temp  Av.9 °F (36.6 °C)  Min: 97.3 °F (36.3 °C)  Max: 98.4 °F (36.9 °C)    NAD  RRR, Nl s1s2  Lungs CTA Bilaterally, normal effort  Abdomen soft, ND, NT, no HSM, Bowel sounds normal.    Data    Data Review:    Recent Labs     20  0004 20   WBC 5.6 5.0   HGB 13.8 11.0*   HCT 40.4 32.7*   MCV 96.2 97.6    160     Recent Labs     20  0201 20  1454 20  0421   * 136 137   K 3.3* 4.0 4.2    105 107   CO2 24 25 24   BUN 14 15 14   CREATININE 0.7 0.8 0.8     Recent Labs     20  0109 20  0201 20  0421   * 723* 505*   * 606* 442*   BILIDIR  --   --  0.8*   BILITOT 1.9* 2.1* 1.5*   ALKPHOS 245* 281* 212*     Recent Labs     20  0109 20  0421   LIPASE 82.0* 89.0*     Recent Labs     20  0004   PROTIME 14.2*   INR 1.22*     No results for input(s): PTT in the last 72 hours. RUQUS Neg for stones/sludge or winston dil. COVID negative. ASSESSMENT   1. Elevated liver enzymes- ALT improving from 600 down to 442. Bili also improving.   Again initial AST/ALT levels higher than expected for pure alcoholic hepatitis, although there may be some contribution from that. DDX would include resolving ischemic hepatitis,   2. Cirrhosis from etoh. MELD  11.   3. Chronic calcific pancreatitis. PLAN    1. Await DIAZ etc  2. Complete alcohol cessation  3. outpt egd for variceal screening.      Rober Morel MD  [address]

## 2020-06-28 PROBLEM — R76.8 ANA POSITIVE: Status: ACTIVE | Noted: 2020-06-28

## 2020-06-28 LAB
ALBUMIN SERPL-MCNC: 2.3 G/DL (ref 3.4–5)
ALP BLD-CCNC: 204 U/L (ref 40–129)
ALT SERPL-CCNC: 441 U/L (ref 10–40)
ANA INTERPRETATION: ABNORMAL
ANA PATTERN: ABNORMAL
ANA TITER: ABNORMAL
ANION GAP SERPL CALCULATED.3IONS-SCNC: 4 MMOL/L (ref 3–16)
ANTI-NUCLEAR ANTIBODY (ANA): POSITIVE
AST SERPL-CCNC: 566 U/L (ref 15–37)
BASOPHILS ABSOLUTE: 0 K/UL (ref 0–0.2)
BASOPHILS RELATIVE PERCENT: 0.6 %
BILIRUB SERPL-MCNC: 1.5 MG/DL (ref 0–1)
BILIRUBIN DIRECT: 0.9 MG/DL (ref 0–0.3)
BILIRUBIN, INDIRECT: 0.6 MG/DL (ref 0–1)
BUN BLDV-MCNC: 11 MG/DL (ref 7–20)
CALCIUM SERPL-MCNC: 8.4 MG/DL (ref 8.3–10.6)
CHLORIDE BLD-SCNC: 108 MMOL/L (ref 99–110)
CO2: 27 MMOL/L (ref 21–32)
CREAT SERPL-MCNC: 0.8 MG/DL (ref 0.6–1.2)
EOSINOPHILS ABSOLUTE: 0.1 K/UL (ref 0–0.6)
EOSINOPHILS RELATIVE PERCENT: 2.4 %
GFR AFRICAN AMERICAN: >60
GFR NON-AFRICAN AMERICAN: >60
GLUCOSE BLD-MCNC: 99 MG/DL (ref 70–99)
HCT VFR BLD CALC: 31.2 % (ref 36–48)
HEMOGLOBIN: 10.5 G/DL (ref 12–16)
LIPASE: 86 U/L (ref 13–60)
LYMPHOCYTES ABSOLUTE: 1.9 K/UL (ref 1–5.1)
LYMPHOCYTES RELATIVE PERCENT: 48.6 %
MCH RBC QN AUTO: 32.1 PG (ref 26–34)
MCHC RBC AUTO-ENTMCNC: 33.5 G/DL (ref 31–36)
MCV RBC AUTO: 95.7 FL (ref 80–100)
MONOCYTES ABSOLUTE: 0.9 K/UL (ref 0–1.3)
MONOCYTES RELATIVE PERCENT: 22.4 %
NEUTROPHILS ABSOLUTE: 1 K/UL (ref 1.7–7.7)
NEUTROPHILS RELATIVE PERCENT: 26 %
PATHOLOGIST: ABNORMAL
PDW BLD-RTO: 15.4 % (ref 12.4–15.4)
PLATELET # BLD: 151 K/UL (ref 135–450)
PMV BLD AUTO: 8 FL (ref 5–10.5)
POTASSIUM REFLEX MAGNESIUM: 4 MMOL/L (ref 3.5–5.1)
RBC # BLD: 3.26 M/UL (ref 4–5.2)
SARS-COV-2, NAAT: NOT DETECTED
SODIUM BLD-SCNC: 139 MMOL/L (ref 136–145)
TOTAL PROTEIN: 5.7 G/DL (ref 6.4–8.2)
WBC # BLD: 3.8 K/UL (ref 4–11)

## 2020-06-28 PROCEDURE — 80076 HEPATIC FUNCTION PANEL: CPT

## 2020-06-28 PROCEDURE — 36415 COLL VENOUS BLD VENIPUNCTURE: CPT

## 2020-06-28 PROCEDURE — 6370000000 HC RX 637 (ALT 250 FOR IP): Performed by: INTERNAL MEDICINE

## 2020-06-28 PROCEDURE — 2580000003 HC RX 258: Performed by: INTERNAL MEDICINE

## 2020-06-28 PROCEDURE — 80048 BASIC METABOLIC PNL TOTAL CA: CPT

## 2020-06-28 PROCEDURE — 94640 AIRWAY INHALATION TREATMENT: CPT

## 2020-06-28 PROCEDURE — 83690 ASSAY OF LIPASE: CPT

## 2020-06-28 PROCEDURE — 94761 N-INVAS EAR/PLS OXIMETRY MLT: CPT

## 2020-06-28 PROCEDURE — 1200000000 HC SEMI PRIVATE

## 2020-06-28 PROCEDURE — 99222 1ST HOSP IP/OBS MODERATE 55: CPT | Performed by: INTERNAL MEDICINE

## 2020-06-28 PROCEDURE — U0002 COVID-19 LAB TEST NON-CDC: HCPCS

## 2020-06-28 PROCEDURE — 85025 COMPLETE CBC W/AUTO DIFF WBC: CPT

## 2020-06-28 PROCEDURE — 6360000002 HC RX W HCPCS: Performed by: INTERNAL MEDICINE

## 2020-06-28 RX ORDER — IPRATROPIUM BROMIDE AND ALBUTEROL SULFATE 2.5; .5 MG/3ML; MG/3ML
1 SOLUTION RESPIRATORY (INHALATION) EVERY 4 HOURS PRN
Status: DISCONTINUED | OUTPATIENT
Start: 2020-06-28 | End: 2020-07-03 | Stop reason: HOSPADM

## 2020-06-28 RX ADMIN — PANTOPRAZOLE SODIUM 40 MG: 40 TABLET, DELAYED RELEASE ORAL at 06:56

## 2020-06-28 RX ADMIN — Medication 100 MG: at 09:21

## 2020-06-28 RX ADMIN — GABAPENTIN 300 MG: 300 CAPSULE ORAL at 14:04

## 2020-06-28 RX ADMIN — PANTOPRAZOLE SODIUM 40 MG: 40 TABLET, DELAYED RELEASE ORAL at 16:16

## 2020-06-28 RX ADMIN — IPRATROPIUM BROMIDE AND ALBUTEROL SULFATE 1 AMPULE: .5; 3 SOLUTION RESPIRATORY (INHALATION) at 16:22

## 2020-06-28 RX ADMIN — TIOTROPIUM BROMIDE INHALATION SPRAY 2 PUFF: 3.12 SPRAY, METERED RESPIRATORY (INHALATION) at 08:21

## 2020-06-28 RX ADMIN — GABAPENTIN 300 MG: 300 CAPSULE ORAL at 20:29

## 2020-06-28 RX ADMIN — Medication 10 ML: at 09:22

## 2020-06-28 RX ADMIN — FOLIC ACID 1 MG: 1 TABLET ORAL at 09:21

## 2020-06-28 RX ADMIN — GABAPENTIN 300 MG: 300 CAPSULE ORAL at 09:21

## 2020-06-28 RX ADMIN — ASPIRIN 81 MG 81 MG: 81 TABLET ORAL at 09:21

## 2020-06-28 RX ADMIN — ATORVASTATIN CALCIUM 80 MG: 80 TABLET, FILM COATED ORAL at 09:21

## 2020-06-28 RX ADMIN — ENOXAPARIN SODIUM 70 MG: 80 INJECTION SUBCUTANEOUS at 09:22

## 2020-06-28 RX ADMIN — GLYCOPYRROLATE AND FORMOTEROL FUMARATE 2 PUFF: 9; 4.8 AEROSOL, METERED RESPIRATORY (INHALATION) at 19:27

## 2020-06-28 RX ADMIN — THERA TABS 1 TABLET: TAB at 09:21

## 2020-06-28 RX ADMIN — GLYCOPYRROLATE AND FORMOTEROL FUMARATE 2 PUFF: 9; 4.8 AEROSOL, METERED RESPIRATORY (INHALATION) at 08:21

## 2020-06-28 RX ADMIN — Medication 10 ML: at 20:29

## 2020-06-28 RX ADMIN — ENOXAPARIN SODIUM 70 MG: 80 INJECTION SUBCUTANEOUS at 20:30

## 2020-06-28 RX ADMIN — SODIUM CHLORIDE, POTASSIUM CHLORIDE, SODIUM LACTATE AND CALCIUM CHLORIDE: 600; 310; 30; 20 INJECTION, SOLUTION INTRAVENOUS at 05:41

## 2020-06-28 ASSESSMENT — PAIN SCALES - GENERAL
PAINLEVEL_OUTOF10: 0

## 2020-06-28 ASSESSMENT — PAIN DESCRIPTION - INTENSITY: RATING_2: 0

## 2020-06-28 NOTE — PLAN OF CARE
Pt denies any needs. Pt voiced readiness for discharge. Explained the reasoning for admission and what we are investigating at this time. Pt verbalized understanding. Pt updated on NPO status at midnight.

## 2020-06-28 NOTE — CONSULTS
[facility]  [phone]        Reason for Consultation/Chief Complaint: \" Elevated troponin. \"    History of Present Illness:  Matthew Christensen is a 76 y.o. patient who presented to the hospital with complaints of abdominal discomfort. She was subsequently diagnosed with pancreatitis. Troponin elevation noted. She denies chest pain or shortness of breath. Past history of CAD status post PCI 2 years ago at [facility]. Past Medical History:   has a past medical history of ETOH abuse, HTN (hypertension), Pancreatitis, and Shingles. Surgical History:   has a past surgical history that includes Abdomen surgery. Social History:   reports that she has never smoked. She has never used smokeless tobacco. She reports previous alcohol use. She reports that she does not use drugs. Family History:  family history includes Diabetes in her sister. Home Medications:  Were reviewed and are listed in nursing record. and/or listed below  Prior to Admission medications    Medication Sig Start Date End Date Taking? Authorizing Provider   aclidinium (TUDORZA PRESSAIR) 400 MCG/ACT AEPB inhaler Inhale 1 puff into the lungs 2 times daily   Yes Historical Provider, MD   albuterol sulfate HFA (PROAIR HFA) 108 (90 Base) MCG/ACT inhaler Inhale 2 puffs into the lungs every 6 hours as needed for Wheezing   Yes Historical Provider, MD   aspirin 81 MG chewable tablet Take 81 mg by mouth daily   Yes Historical Provider, MD   atorvastatin (LIPITOR) 80 MG tablet Take 80 mg by mouth daily   Yes Historical Provider, MD   gabapentin (NEURONTIN) 300 MG capsule Take 300 mg by mouth 3 times daily. Yes Historical Provider, MD   hydroCHLOROthiazide (HYDRODIURIL) 25 MG tablet Take 25 mg by mouth daily   Yes Historical Provider, MD   HYDROcodone-acetaminophen (NORCO) 7.5-325 MG per tablet Take 1 tablet by mouth every 6 hours as needed for Pain.    Yes Historical Provider, MD   ibuprofen (ADVIL;MOTRIN) 800 MG tablet Take 800 mg by mouth every 8 hours as needed for Pain   Yes Historical Provider, MD   ipratropium-albuterol (DUONEB) 0.5-2.5 (3) MG/3ML SOLN nebulizer solution Inhale 1 vial into the lungs every 4 hours   Yes Historical Provider, MD   losartan (COZAAR) 100 MG tablet Take 100 mg by mouth daily   Yes Historical Provider, MD   metoprolol tartrate (LOPRESSOR) 25 MG tablet Take 25 mg by mouth 2 times daily   Yes Historical Provider, MD   umeclidinium-vilanterol (ANORO ELLIPTA) 62.5-25 MCG/INH AEPB inhaler Inhale 1 puff into the lungs daily   Yes Historical Provider, MD   cephALEXin (KEFLEX) 500 MG capsule Take 500 mg by mouth 3 times daily   Yes Historical Provider, MD   SUMAtriptan (IMITREX) 20 MG/ACT nasal spray 1 spray by Nasal route daily as needed for Migraine    Historical Provider, MD        Allergies:  Lisinopril     Review of Systems:   A complete review of systems has been reviewed and updated today and is negative except as noted in the history of present illness.       Physical Examination:    Vitals:    06/28/20 0920   BP: 113/63   Pulse: 68   Resp: 14   Temp: 97 °F (36.1 °C)   SpO2: 96%    Weight: 145 lb 8.1 oz (66 kg)         General Appearance:  Alert, cooperative, no distress, appears stated age   Head:  Normocephalic, without obvious abnormality, atraumatic   Eyes:  EOMI, conjunctiva/corneas clear       Nose: Nares normal   Throat: Lips normal   Neck: Supple, symmetrical, trachea midline,  no carotid bruit or JVD       Lungs:   Clear to auscultation bilaterally, respirations unlabored   Chest Wall:  No tenderness or deformity   Heart:  Regular rate and rhythm, S1, S2 normal, no murmur, rub or gallop   Abdomen:   Soft, non-tender, bowel sounds active all four quadrants,  no masses, no organomegaly           Extremities: Extremities normal, atraumatic, no cyanosis or edema   Pulses: 2+ and symmetric   Skin: Skin color, texture, turgor normal, no rashes or lesions   Pysch: Normal mood and affect   Neurologic: Normal gross motor and sensory exam.         Labs  CBC:   Lab Results   Component Value Date    WBC 3.8 06/28/2020    RBC 3.26 06/28/2020    HGB 10.5 06/28/2020    HCT 31.2 06/28/2020    MCV 95.7 06/28/2020    RDW 15.4 06/28/2020     06/28/2020     CMP:    Lab Results   Component Value Date     06/28/2020    K 4.0 06/28/2020     06/28/2020    CO2 27 06/28/2020    BUN 11 06/28/2020    CREATININE 0.8 06/28/2020    GFRAA >60 06/28/2020    AGRATIO 0.7 06/26/2020    LABGLOM >60 06/28/2020    GLUCOSE 99 06/28/2020    PROT 5.7 06/28/2020    CALCIUM 8.4 06/28/2020    BILITOT 1.5 06/28/2020    ALKPHOS 204 06/28/2020     06/28/2020     06/28/2020     LIPIDS: No components found for: TOTAL CHOLESTEROL,  HDL,  LDL,  TRIGLYCERIDES  PT/INR:  No results found for: PTINR  Lab Results   Component Value Date    TROPONINI 0.06 (H) 06/26/2020       EKG:  I have reviewed EKG with the following interpretation:  Impression:    25-JUN-2020 21:59:22 [facility] ROUTINE RECORD  Sinus bradycardia with short NV  Incomplete right bundle branch block  T wave abnormality, consider anterolateral ischemia  Abnormal ECG    Imaging/Procedures:   Echo 6/26/2020:   -Normal left ventricle size, wall thickness, and systolic function with an   estimated ejection fraction of 70%.   -No regional wall motion abnormalities are seen.   -Normal diastolic function. Avg. E/e'=11.35    Limited ultrasound abdomen 6/26/2020:  No cholelithiasis or ancillary evidence of acute cholecystitis.       Heterogeneous hepatic echotexture, which can be in keeping with fatty liver   or underlying hepatocellular disease. CT abdomen pelvis 6/26/2020:  No acute abnormality.  The liver appears cirrhotic and possibly fibrotic. This can be further evaluated with nonemergent MRI. Cardiac cath [facility] 9/22/2016:  Coronary angiography:   1.  LM is of large caliber and gives rise to the LAD and left   circumflex coronary arteries.      It is free of significant disease  2. LAD system is large caliber system and has diffuse mild   disease 20-30% without any significant obstructive lesion.  It   gives rise to 2 small caliber diagonal that has moderate to   severe ostial disease.    3. LCx system is large caliber and gives rise to medium caliber   OM1.  OM1 has 99% subtotal occlusion followed by sequential 85%   lesion with JANN 2 flow. 4. RCA system is large-caliber system and has mild diffuse   disease with focal lesion in the midsegment about 80% tubular   lesion in between mid and distal segment about 85%.  It   bifurcates.  Posterior lateral system that has luminal   irregularities.      Impression:  Successful PCI to the OM1 99% subtotal occlusion with   drug-eluting senna G2 0.25 x 28 mm stent   Mild disease in LAD   Severe disease in the RCA   Preserved LV Function     Plan:     Medical treatment of CAD.  Further management of the RCA   depending on clinical course.  Aspirin for life. Plavix for > 1   year ( Patient has been advised strongly for compliance with the   dual antiplatelet therapy. I have personally prescribed the   Plavix script with adequate number of refills). Assessment/Plan:  Principal Problem:    Abdominal pain  Plan: Secondary pancreatitis. Per primary service. Active Problems:    Troponin level elevated  Plan: Doubt ACS. 2D echo Doppler unremarkable. Recommend cardiac stress test-Lexiscan Myoview stress test      Transaminitis  Plan: Improving. CAD (coronary artery disease)  Plan: Asymptomatic. Continue aspirin. Risk factor modification. Lexiscan Myoview stress test tomorrow. If unremarkable, she can be discharged home from a cardiology standpoint. Thank you for allowing us to participate in the care of Matthew Christesnen. Further evaluation will be based upon the patient's clinical course and testing results. All questions and concerns were addressed to the patient/family.  Alternatives to my treatment were discussed. The note was completed using EMR. Every effort was made to ensure accuracy; however, inadvertent computerized transcription errors may be present.     Dawood Pena M.D.

## 2020-06-28 NOTE — PROGRESS NOTES
[facility] PROGRESS NOTE    6/28/2020 10:33 AM        Name: Nidhi Jones . Admitted: 6/25/2020  Primary Care Provider: Bigg Humphries MD (Tel: [phone])    Brief Course:  76 y.o. female with history of alcoholic pancreatitis, CAD s/p stents, COPD, HTN, h/o alcohol abuse came to ER with complaints of abdominal pain. Admitted as inpatient for abdominal pain. Kept NPO, started on IVF and PPI. Followed by GI for transaminitis. COVID negative on 6/26. Advanced and tolerated diet. Started on Lovenox bid for NSTEMI. Seen by Cardio. For SPECT on 6/29. Repeat COVID requested by GI on 6/28 for transaminitis and URI sx. DIAZ is positive. CC: Ab Pain    Subjective:  . Patient denies abdominal pain. Has sore throat, cough and congestion. No CP, SOB, HA or fevers. Tolerating diet.     Reviewed interval ancillary notes    Current Medications  ipratropium-albuterol (DUONEB) nebulizer solution 1 ampule, Q4H PRN  multivitamin 1 tablet, Daily  vitamin B-1 (THIAMINE) tablet 108 mg, Daily  folic acid (FOLVITE) tablet 1 mg, Daily  pantoprazole (PROTONIX) tablet 40 mg, BID AC  aluminum & magnesium hydroxide-simethicone (MAALOX) 200-200-20 MG/5ML suspension 30 mL, Q6H PRN  aspirin chewable tablet 81 mg, Daily  atorvastatin (LIPITOR) tablet 80 mg, Daily  gabapentin (NEURONTIN) capsule 300 mg, TID  sodium chloride flush 0.9 % injection 10 mL, 2 times per day  sodium chloride flush 0.9 % injection 10 mL, PRN  acetaminophen (TYLENOL) tablet 650 mg, Q6H PRN    Or  acetaminophen (TYLENOL) suppository 650 mg, Q6H PRN  polyethylene glycol (GLYCOLAX) packet 17 g, Daily PRN  promethazine (PHENERGAN) tablet 12.5 mg, Q6H PRN    Or  ondansetron (ZOFRAN) injection 4 mg, Q6H PRN  enoxaparin (LOVENOX) injection 70 mg, BID  lactated ringers infusion, Continuous  potassium chloride (KLOR-CON M) extended release tablet 40 mEq,

## 2020-06-28 NOTE — PROGRESS NOTES
[facility] GI  Gastroenterology Progress Note  Francisco Jacob is a 76 y.o. female patient. 1. Elevated troponin    2. Generalized abdominal pain    3. Cirrhosis of liver without ascites, unspecified hepatic cirrhosis type ([address]    4. Transaminitis    5. Elevated lipase    6. EKG abnormalities        SUBJECTIVE:   Now has sore throat, congestion, productive cough. Physical    VITALS:  /65   Pulse 68   Temp 97.3 °F (36.3 °C) (Temporal)   Resp 17   Ht 5' 6\" (1.676 m)   Wt 145 lb 8.1 oz (66 kg)   SpO2 92%   BMI 23.48 kg/m²   TEMPERATURE:  Current - Temp: 97.3 °F (36.3 °C); Max - Temp  Av.8 °F (36.6 °C)  Min: 97.3 °F (36.3 °C)  Max: 98.1 °F (36.7 °C)    Abdomen soft, ND, NT, no HSM, Bowel sounds normal   Rrr. Nl s1s2  aaox 3 no asterixis. No jaundice  Data      Recent Labs     20  0004 20  0421 20  0604   WBC 5.6 5.0 3.8*   HGB 13.8 11.0* 10.5*   HCT 40.4 32.7* 31.2*   MCV 96.2 97.6 95.7    160 151     Recent Labs     20  1454 20  0421 20  0604    137 139   K 4.0 4.2 4.0    107 108   CO2 25 24 27   BUN 15 14 11   CREATININE 0.8 0.8 0.8     Recent Labs     20  0201 20  0421 20  0604   * 505* 566*   * 442* 441*   BILIDIR  --  0.8* 0.9*   BILITOT 2.1* 1.5* 1.5*   ALKPHOS 281* 212* 204*     Recent Labs     20  0109 20  0421 20  0604   LIPASE 82.0* 89.0* 86.0*           ASSESSMENT   1. Elevated liver enzymes- ALT improved overall but still remains elevated. Some of this possible from etoh as AST>ALT but some other factor involved as Alc hep does not get transamines up into 800s typically. She now has some URI symptoms so needs to be checked again for COVID  2. Cirrhosis from etoh. MELD  11.   3. Chronic calcific pancreatitis. 4. URI- new onset symptoms today     PLAN    1. Await DIAZ etc  2. Complete alcohol cessation  3. outpt egd for variceal screening.    4. Since pt is now experiencing some \"cold\" symptoms, would repeat COVID testing (neg on 6/26) as this can cause acute hepatitis presentation.      Navarro Sherwood MD  [address]

## 2020-06-29 PROBLEM — E87.6 HYPOKALEMIA: Status: RESOLVED | Noted: 2020-06-26 | Resolved: 2020-06-29

## 2020-06-29 PROBLEM — E87.5 HYPERKALEMIA: Status: RESOLVED | Noted: 2020-06-26 | Resolved: 2020-06-29

## 2020-06-29 LAB
ALBUMIN SERPL-MCNC: 2.5 G/DL (ref 3.4–5)
ALP BLD-CCNC: 221 U/L (ref 40–129)
ALT SERPL-CCNC: 480 U/L (ref 10–40)
ANION GAP SERPL CALCULATED.3IONS-SCNC: 6 MMOL/L (ref 3–16)
AST SERPL-CCNC: 640 U/L (ref 15–37)
BASOPHILS ABSOLUTE: 0 K/UL (ref 0–0.2)
BASOPHILS RELATIVE PERCENT: 0.5 %
BILIRUB SERPL-MCNC: 1.8 MG/DL (ref 0–1)
BILIRUBIN DIRECT: 1.1 MG/DL (ref 0–0.3)
BILIRUBIN, INDIRECT: 0.7 MG/DL (ref 0–1)
BUN BLDV-MCNC: 9 MG/DL (ref 7–20)
CALCIUM SERPL-MCNC: 8.7 MG/DL (ref 8.3–10.6)
CHLORIDE BLD-SCNC: 109 MMOL/L (ref 99–110)
CO2: 23 MMOL/L (ref 21–32)
CREAT SERPL-MCNC: 0.7 MG/DL (ref 0.6–1.2)
EOSINOPHILS ABSOLUTE: 0.1 K/UL (ref 0–0.6)
EOSINOPHILS RELATIVE PERCENT: 2 %
GFR AFRICAN AMERICAN: >60
GFR NON-AFRICAN AMERICAN: >60
GLUCOSE BLD-MCNC: 95 MG/DL (ref 70–99)
HAV AB SERPL IA-ACNC: NEGATIVE
HCT VFR BLD CALC: 32.5 % (ref 36–48)
HEMOGLOBIN: 11 G/DL (ref 12–16)
LIPASE: 90 U/L (ref 13–60)
LV EF: 72 %
LVEF MODALITY: NORMAL
LYMPHOCYTES ABSOLUTE: 1.9 K/UL (ref 1–5.1)
LYMPHOCYTES RELATIVE PERCENT: 45.2 %
MCH RBC QN AUTO: 32.3 PG (ref 26–34)
MCHC RBC AUTO-ENTMCNC: 33.8 G/DL (ref 31–36)
MCV RBC AUTO: 95.7 FL (ref 80–100)
MONOCYTES ABSOLUTE: 0.9 K/UL (ref 0–1.3)
MONOCYTES RELATIVE PERCENT: 21.3 %
NEUTROPHILS ABSOLUTE: 1.3 K/UL (ref 1.7–7.7)
NEUTROPHILS RELATIVE PERCENT: 31 %
PDW BLD-RTO: 15.5 % (ref 12.4–15.4)
PLATELET # BLD: 167 K/UL (ref 135–450)
PMV BLD AUTO: 8.1 FL (ref 5–10.5)
POTASSIUM REFLEX MAGNESIUM: 3.9 MMOL/L (ref 3.5–5.1)
RBC # BLD: 3.4 M/UL (ref 4–5.2)
SODIUM BLD-SCNC: 138 MMOL/L (ref 136–145)
TOTAL PROTEIN: 6.2 G/DL (ref 6.4–8.2)
WBC # BLD: 4.3 K/UL (ref 4–11)

## 2020-06-29 PROCEDURE — 6370000000 HC RX 637 (ALT 250 FOR IP): Performed by: INTERNAL MEDICINE

## 2020-06-29 PROCEDURE — 1200000000 HC SEMI PRIVATE

## 2020-06-29 PROCEDURE — 93017 CV STRESS TEST TRACING ONLY: CPT | Performed by: INTERNAL MEDICINE

## 2020-06-29 PROCEDURE — 78452 HT MUSCLE IMAGE SPECT MULT: CPT | Performed by: INTERNAL MEDICINE

## 2020-06-29 PROCEDURE — 6360000002 HC RX W HCPCS: Performed by: PHYSICIAN ASSISTANT

## 2020-06-29 PROCEDURE — 80076 HEPATIC FUNCTION PANEL: CPT

## 2020-06-29 PROCEDURE — 2580000003 HC RX 258: Performed by: INTERNAL MEDICINE

## 2020-06-29 PROCEDURE — 3430000000 HC RX DIAGNOSTIC RADIOPHARMACEUTICAL: Performed by: INTERNAL MEDICINE

## 2020-06-29 PROCEDURE — 36415 COLL VENOUS BLD VENIPUNCTURE: CPT

## 2020-06-29 PROCEDURE — 83690 ASSAY OF LIPASE: CPT

## 2020-06-29 PROCEDURE — 6360000002 HC RX W HCPCS: Performed by: INTERNAL MEDICINE

## 2020-06-29 PROCEDURE — 85025 COMPLETE CBC W/AUTO DIFF WBC: CPT

## 2020-06-29 PROCEDURE — A9502 TC99M TETROFOSMIN: HCPCS | Performed by: INTERNAL MEDICINE

## 2020-06-29 PROCEDURE — 94761 N-INVAS EAR/PLS OXIMETRY MLT: CPT

## 2020-06-29 PROCEDURE — 94640 AIRWAY INHALATION TREATMENT: CPT

## 2020-06-29 PROCEDURE — 80048 BASIC METABOLIC PNL TOTAL CA: CPT

## 2020-06-29 PROCEDURE — 99233 SBSQ HOSP IP/OBS HIGH 50: CPT | Performed by: INTERNAL MEDICINE

## 2020-06-29 RX ORDER — METHYLPREDNISOLONE SODIUM SUCCINATE 40 MG/ML
20 INJECTION, POWDER, LYOPHILIZED, FOR SOLUTION INTRAMUSCULAR; INTRAVENOUS EVERY 12 HOURS
Status: DISCONTINUED | OUTPATIENT
Start: 2020-06-29 | End: 2020-07-03 | Stop reason: HOSPADM

## 2020-06-29 RX ADMIN — PANTOPRAZOLE SODIUM 40 MG: 40 TABLET, DELAYED RELEASE ORAL at 15:21

## 2020-06-29 RX ADMIN — REGADENOSON 0.4 MG: 0.08 INJECTION, SOLUTION INTRAVENOUS at 13:31

## 2020-06-29 RX ADMIN — Medication 10 ML: at 07:50

## 2020-06-29 RX ADMIN — ATORVASTATIN CALCIUM 80 MG: 80 TABLET, FILM COATED ORAL at 07:49

## 2020-06-29 RX ADMIN — FOLIC ACID 1 MG: 1 TABLET ORAL at 07:49

## 2020-06-29 RX ADMIN — ASPIRIN 81 MG 81 MG: 81 TABLET ORAL at 07:49

## 2020-06-29 RX ADMIN — ACETAMINOPHEN 650 MG: 325 TABLET, FILM COATED ORAL at 05:11

## 2020-06-29 RX ADMIN — GABAPENTIN 300 MG: 300 CAPSULE ORAL at 07:49

## 2020-06-29 RX ADMIN — TETROFOSMIN 10 MILLICURIE: 1.38 INJECTION, POWDER, LYOPHILIZED, FOR SOLUTION INTRAVENOUS at 12:41

## 2020-06-29 RX ADMIN — Medication 100 MG: at 07:49

## 2020-06-29 RX ADMIN — PANTOPRAZOLE SODIUM 40 MG: 40 TABLET, DELAYED RELEASE ORAL at 05:11

## 2020-06-29 RX ADMIN — TIOTROPIUM BROMIDE INHALATION SPRAY 2 PUFF: 3.12 SPRAY, METERED RESPIRATORY (INHALATION) at 09:02

## 2020-06-29 RX ADMIN — THERA TABS 1 TABLET: TAB at 07:49

## 2020-06-29 RX ADMIN — GABAPENTIN 300 MG: 300 CAPSULE ORAL at 15:20

## 2020-06-29 RX ADMIN — GLYCOPYRROLATE AND FORMOTEROL FUMARATE 2 PUFF: 9; 4.8 AEROSOL, METERED RESPIRATORY (INHALATION) at 19:56

## 2020-06-29 RX ADMIN — GABAPENTIN 300 MG: 300 CAPSULE ORAL at 21:15

## 2020-06-29 RX ADMIN — TETROFOSMIN 30 MILLICURIE: 1.38 INJECTION, POWDER, LYOPHILIZED, FOR SOLUTION INTRAVENOUS at 13:41

## 2020-06-29 RX ADMIN — ENOXAPARIN SODIUM 70 MG: 80 INJECTION SUBCUTANEOUS at 21:15

## 2020-06-29 RX ADMIN — GLYCOPYRROLATE AND FORMOTEROL FUMARATE 2 PUFF: 9; 4.8 AEROSOL, METERED RESPIRATORY (INHALATION) at 09:02

## 2020-06-29 RX ADMIN — METHYLPREDNISOLONE SODIUM SUCCINATE 20 MG: 40 INJECTION, POWDER, FOR SOLUTION INTRAMUSCULAR; INTRAVENOUS at 15:21

## 2020-06-29 RX ADMIN — ENOXAPARIN SODIUM 70 MG: 80 INJECTION SUBCUTANEOUS at 07:50

## 2020-06-29 RX ADMIN — Medication 10 ML: at 21:15

## 2020-06-29 RX ADMIN — IPRATROPIUM BROMIDE AND ALBUTEROL SULFATE 1 AMPULE: .5; 3 SOLUTION RESPIRATORY (INHALATION) at 18:15

## 2020-06-29 ASSESSMENT — PAIN DESCRIPTION - DESCRIPTORS
DESCRIPTORS: DISCOMFORT;CRAMPING;SHARP
DESCRIPTORS: SHARP
DESCRIPTORS: DISCOMFORT;CRAMPING;SHARP
DESCRIPTORS: CRAMPING;DISCOMFORT;SHARP

## 2020-06-29 ASSESSMENT — PAIN DESCRIPTION - LOCATION
LOCATION: ABDOMEN

## 2020-06-29 ASSESSMENT — PAIN DESCRIPTION - ORIENTATION
ORIENTATION: LEFT
ORIENTATION: LEFT;LOWER

## 2020-06-29 ASSESSMENT — PAIN DESCRIPTION - ONSET
ONSET: ON-GOING

## 2020-06-29 ASSESSMENT — PAIN DESCRIPTION - PAIN TYPE
TYPE: ACUTE PAIN
TYPE: CHRONIC PAIN
TYPE: ACUTE PAIN

## 2020-06-29 ASSESSMENT — PAIN SCALES - GENERAL
PAINLEVEL_OUTOF10: 8
PAINLEVEL_OUTOF10: 5
PAINLEVEL_OUTOF10: 3
PAINLEVEL_OUTOF10: 8

## 2020-06-29 ASSESSMENT — PAIN DESCRIPTION - FREQUENCY
FREQUENCY: CONTINUOUS

## 2020-06-29 ASSESSMENT — PAIN DESCRIPTION - PROGRESSION
CLINICAL_PROGRESSION: NOT CHANGED

## 2020-06-29 NOTE — PROGRESS NOTES
Consent for liver biopsy signed and place in chart. Transport at bedside to take pt for stress test. No further questions.  Will print out information sheets for pt about liver biopsy per request.

## 2020-06-29 NOTE — PROGRESS NOTES
[facility] Progress Note      Admit Date:  6/25/2020    Chief Complaint   Patient presents with    Abdominal Pain     Pt states has a uti, antibiotics, and has hx of high liver enzymes and pancreas issues. Pt states abdominal pain x months, worse today and patient states feels like her equilibrium is off as well. Subjective:  Ms. Kristie Sever denies exertional chest pain, SOB/HATFIELD, PND, palpitations, light-headedness, or edema.      Objective:   /83   Pulse 67   Temp 97 °F (36.1 °C) (Temporal)   Resp 18   Ht 5' 6\" (1.676 m)   Wt 151 lb 14.4 oz (68.9 kg)   SpO2 94%   BMI 24.52 kg/m²   No intake or output data in the 24 hours ending 06/29/20 1327    TELEMETRY: Sinus     Physical Exam:  General:  Awake, alert, oriented x 3, NAD  Skin:  Warm and dry  Neck:  JVD flat  Chest:  normal air entry  Cardiovascular:  RRR S1S2, no S3, no mrmr  Abdomen:  Soft, ND, NT, No HSM  Extremities:  No edema    Medications:    multivitamin  1 tablet Oral Daily    thiamine  100 mg Oral Daily    folic acid  1 mg Oral Daily    pantoprazole  40 mg Oral BID AC    atorvastatin  80 mg Oral Daily    gabapentin  300 mg Oral TID    sodium chloride flush  10 mL Intravenous 2 times per day    enoxaparin  1 mg/kg Subcutaneous BID    tiotropium  2 puff Inhalation Daily    glycopyrrolate-formoterol  2 puff Inhalation BID       regadenoson, technetium tetrofosmin, ipratropium-albuterol, aluminum & magnesium hydroxide-simethicone, sodium chloride flush, acetaminophen **OR** acetaminophen, polyethylene glycol, promethazine **OR** ondansetron, potassium chloride **OR** potassium alternative oral replacement **OR** potassium chloride, magnesium sulfate, nitroGLYCERIN, morphine, perflutren lipid microspheres    Lab Data:  CBC:   Recent Labs     06/27/20  0421 06/28/20  0604 06/29/20  0438   WBC 5.0 3.8* 4.3   HGB 11.0* 10.5* 11.0*   HCT 32.7* 31.2* 32.5*   MCV 97.6 95.7 95.7    151 167     BMP:   Recent Labs 06/27/20  0421 06/28/20  0604 06/29/20  0438    139 138   K 4.2 4.0 3.9    108 109   CO2 24 27 23   BUN 14 11 9   CREATININE 0.8 0.8 0.7     LIVER PROFILE:   Recent Labs     06/27/20  0421 06/28/20  0604 06/29/20  0438   * 566* 640*   * 441* 480*   LIPASE 89.0* 86.0* 90.0*   BILIDIR 0.8* 0.9* 1.1*   BILITOT 1.5* 1.5* 1.8*   ALKPHOS 212* 204* 221*     PT/INR: No results for input(s): PROTIME, INR in the last 72 hours. APTT: No results for input(s): APTT in the last 72 hours. BNP:  No results for input(s): BNP in the last 72 hours. IMAGING:  -Normal left ventricle size, wall thickness, and systolic function with an   estimated ejection fraction of 70%.   -No regional wall motion abnormalities are seen.   -Normal diastolic function. Avg. E/e'=11.35    Assessment/Plan:  Principal Problem:     Troponin level elevated  Plan: Likely demand ischemia from acute medical illness. Unlikely to be ACS. Recommend cardiac stress test-Lexiscan Myoview stress test today. If abnormal consider LH cath       Transaminitis  Plan: Improving.       CAD (coronary artery disease)  Plan: Asymptomatic. Stable. Continue aspirin statin. Risk factor modification.     Lexiscan Myoview stress test today. If unremarkable, she can be discharged home from a cardiology standpoint.          London Ponce MD 6/29/2020 1:27 PM

## 2020-06-29 NOTE — PROGRESS NOTES
Pt assessment complete, see flowsheets. Medications given, see MAR. Pt NSR on monitor. Pt alert and oriented x4 and follow commands. Pt on room air. Pt low fat diet, but to be NPO @ 0000. Pt independent to bathroom with no complications. Pt has RAC peripheral IV access, see flowsheets. Pt complete linen/gown change, see flowsheets. Pt able to turn self in bed. Pt bed in lowest position and alarm on. Call light within reach. Pt expressing no other needs at this time. Will continue to monitor. Pt IV occluded-removed with no complications. Charge RUKHSANA GONZALEZ placed new IV, see flowsheets.

## 2020-06-29 NOTE — PROGRESS NOTES
[facility] GI  Gastroenterology Progress Note  Kait Tapia is a 76 y.o. female patient. 1. Elevated troponin    2. Generalized abdominal pain    3. Cirrhosis of liver without ascites, unspecified hepatic cirrhosis type ([address]    4. Transaminitis    5. Elevated lipase    6. EKG abnormalities        SUBJECTIVE:   C/o LLQ pain which she states she has had off and on for a few months. Not affected by BMs. No diarrhea or constipation. Physical    VITALS:  /83   Pulse 67   Temp 97 °F (36.1 °C) (Temporal)   Resp 18   Ht 5' 6\" (1.676 m)   Wt 151 lb 14.4 oz (68.9 kg)   SpO2 94%   BMI 24.52 kg/m²   TEMPERATURE:  Current - Temp: 97 °F (36.1 °C); Max - Temp  Av.3 °F (36.3 °C)  Min: 97 °F (36.1 °C)  Max: 97.7 °F (36.5 °C)    Abdomen soft, ND, NT, no HSM, Bowel sounds normal   Rrr. Nl s1s2  aaox 3 no asterixis. No jaundice  Data      Recent Labs     20  04220  0604 20  0438   WBC 5.0 3.8* 4.3   HGB 11.0* 10.5* 11.0*   HCT 32.7* 31.2* 32.5*   MCV 97.6 95.7 95.7    151 167     Recent Labs     20  04220  0604 20  0438    139 138   K 4.2 4.0 3.9    108 109   CO2 24 27 23   BUN 14 11 9   CREATININE 0.8 0.8 0.7     Recent Labs     20  04220  0604 20  0438   * 566* 640*   * 441* 480*   BILIDIR 0.8* 0.9* 1.1*   BILITOT 1.5* 1.5* 1.8*   ALKPHOS 212* 204* 221*     Recent Labs     20  04220  0604 20  0438   LIPASE 89.0* 86.0* 90.0*           ASSESSMENT   1. Elevated liver enzymes-   Some of this possible from etoh as AST>ALT but some other factor involved as Alcohol hepatitis does not get transamines up into 800s typically. DIAZ positive at 1:160. f-actin pending. Could have autoimmune hepatitis. 2. Cirrhosis from etoh. MELD  11.   3. Chronic calcific pancreatitis. 4. URI- Covid negative x2 here.      PLAN    1. Await f-actin  2. rec liver biopsy  3.  Complete alcohol cessation  4. outpt egd for variceal screening. Discussed with Dr. Nestor Saha, [address]      I have personally performed a face to face diagnostic evaluation on this patient. I have interviewed and examined the patient and I agree with the findings and recommended plan of care. In summary, my findings and plan are the following:  Elevated liver enzymes. Had mild etoh recently but otherwise has been abstinent and this level not typical of acute alcoholic hepatitis. F actin Pending but + DIAZ. May be AIH. Will get Liver bx given the h/o cirrhosis also. Covid neg. May need to start steroids. aaox 3 no asterixis.        Everardo Marie MD  [address]

## 2020-06-29 NOTE — PROGRESS NOTES
Hospitalist Progress Note      PCP: Jessica Og MD    Date of Admission: 6/25/2020    LOS: 3    Chief Complaint:   Chief Complaint   Patient presents with    Abdominal Pain     Pt states has a uti, antibiotics, and has hx of high liver enzymes and pancreas issues. Pt states abdominal pain x months, worse today and patient states feels like her equilibrium is off as well. Case Summary:   67-year-old lady with history of hypertension, alcoholic pancreatitis, coronary artery disease status post stents, COPD, history of alcohol abuse, who presented with abdominal pain/pelvic pain and found to have elevated troponins to rule out ACS. She was also found to have transaminitis of unclear etiology. Laboratory work-up noted for positive DIAZ  COVID-19 negative x2 one on 6/26/2020 and repeated on 6/28/2020      Active Hospital Problems    Diagnosis Date Noted    DIAZ positive [R76.8] 06/28/2020    Troponin level elevated [R79.89] 06/26/2020    Abdominal pain [R10.9] 06/26/2020    Transaminitis [R74.0] 06/26/2020    CAD (coronary artery disease) [I25.10] 06/26/2020    COPD (chronic obstructive [facility] disease) ([facility] [J44.9] 06/26/2020    HTN (hypertension) [I10] 06/26/2020    Cirrhosis ([facility] [K74.60] 06/26/2020    Alcohol abuse [F10.10] 06/26/2020    COVID-19 virus not detected [Z03.818] 06/26/2020    Abnormal EKG [R94.31] 06/26/2020         Principal Problem:    Probable autoimmune hepatitis with transaminitis and DIAZ positive: Of unclear etiology. Noted elevated DIAZ. Seen GI with concern for possible autoimmune hepatitis. Plans to consider starting on steroids. Abdominal pain: Persistent left lower quadrant abdominal pain more in the pelvic area. Reports ongoing for last few months. No prior work-up. Of note abdominal CT on examination did not reveal any pelvic pathology. Pain radiating down to the left thigh. ??   Meralgia paresthetica  - Gynecology consult  -Transvaginal ultrasound  -Pain management    Active Problems:    Troponin level elevated: Unclear etiology. No chest pains. Seen cardiology with plans for Myoview. CAD (coronary artery disease): No chest pains or shortness of breath. Continues on statin. Will add back aspirin and metoprolol. Of note metoprolol losartan were held for hypotension last few days. Will hold back losartan at this time. COPD (chronic obstructive pulmonary disease) ([address]): Without exacerbation. Continue on home inhalers    HTN (hypertension): Stable. Will restart metoprolol and hold losartan. Patient had low blood pressure earlier in her admission    Cirrhosis [facility]): Unclear etiology in the setting of prior history of alcohol abuse. Positive DIAZ. History of alcohol abuse: No recent use. Continue on vitamins    COVID-19 virus not detected    Abnormal EKG        Resolved Problems:    Hyperkalemia    Hypokalemia          Medications:  Reviewed  Infusion Medications   Scheduled Medications    multivitamin  1 tablet Oral Daily    thiamine  100 mg Oral Daily    folic acid  1 mg Oral Daily    pantoprazole  40 mg Oral BID AC    atorvastatin  80 mg Oral Daily    gabapentin  300 mg Oral TID    sodium chloride flush  10 mL Intravenous 2 times per day    enoxaparin  1 mg/kg Subcutaneous BID    tiotropium  2 puff Inhalation Daily    glycopyrrolate-formoterol  2 puff Inhalation BID     PRN Meds: ipratropium-albuterol, aluminum & magnesium hydroxide-simethicone, sodium chloride flush, acetaminophen **OR** acetaminophen, polyethylene glycol, promethazine **OR** ondansetron, potassium chloride **OR** potassium alternative oral replacement **OR** potassium chloride, magnesium sulfate, nitroGLYCERIN, morphine, perflutren lipid microspheres      DVT Prophylaxis: Subcut enoxaparin  Diet: Diet NPO, After Midnight  Code Status: Full Code    Dispo:  Anticipate discharge in the next 48 06/26/2020    GLUCOSEU Negative 06/26/2020       Radiology:  US ABDOMEN LIMITED Specify organ? LIVER, PANCREAS, GALLBLADDER   Final Result   No cholelithiasis or ancillary evidence of acute cholecystitis. Heterogeneous hepatic echotexture, which can be in keeping with fatty liver   or underlying hepatocellular disease. CT ABDOMEN PELVIS WO CONTRAST Additional Contrast? None   Final Result   No acute abnormality. The liver appears cirrhotic and possibly fibrotic. This can be further evaluated with nonemergent MRI. XR CHEST PORTABLE   Final Result   Hyperplasia lungs. Bibasilar increased density could be related to bronchovascular padding. Pneumonia could be considered, and particularly given asymmetric right mid   lung increased opacity. NM MYOCARDIAL SPECT REST EXERCISE OR RX    (Results Pending)   US BIOPSY LIVER PERCUTANEOUS    (Results Pending)   US PELVIS COMPLETE    (Results Pending)           Nic Lewis MD      Please excuse brevity and/or typos. This report was transcribed using voice recognition software. Every effort was made to ensure accuracy, however, inadvertent computerized transcription errors may be present.

## 2020-06-30 ENCOUNTER — APPOINTMENT (OUTPATIENT)
Dept: ULTRASOUND IMAGING | Age: 65
DRG: 441 | End: 2020-06-30
Payer: MEDICARE

## 2020-06-30 PROBLEM — K75.4 AUTOIMMUNE HEPATITIS (HCC): Status: ACTIVE | Noted: 2020-06-30

## 2020-06-30 LAB
ALBUMIN SERPL-MCNC: 2.8 G/DL (ref 3.4–5)
ALP BLD-CCNC: 254 U/L (ref 40–129)
ALT SERPL-CCNC: 493 U/L (ref 10–40)
AST SERPL-CCNC: 594 U/L (ref 15–37)
BILIRUB SERPL-MCNC: 1.6 MG/DL (ref 0–1)
BILIRUBIN DIRECT: 0.8 MG/DL (ref 0–0.3)
BILIRUBIN, INDIRECT: 0.8 MG/DL (ref 0–1)
TOTAL PROTEIN: 6.8 G/DL (ref 6.4–8.2)

## 2020-06-30 PROCEDURE — 6370000000 HC RX 637 (ALT 250 FOR IP): Performed by: INTERNAL MEDICINE

## 2020-06-30 PROCEDURE — 6360000002 HC RX W HCPCS: Performed by: INTERNAL MEDICINE

## 2020-06-30 PROCEDURE — 94761 N-INVAS EAR/PLS OXIMETRY MLT: CPT

## 2020-06-30 PROCEDURE — 86304 IMMUNOASSAY TUMOR CA 125: CPT

## 2020-06-30 PROCEDURE — 2580000003 HC RX 258: Performed by: INTERNAL MEDICINE

## 2020-06-30 PROCEDURE — 94640 AIRWAY INHALATION TREATMENT: CPT

## 2020-06-30 PROCEDURE — 76856 US EXAM PELVIC COMPLETE: CPT

## 2020-06-30 PROCEDURE — 36415 COLL VENOUS BLD VENIPUNCTURE: CPT

## 2020-06-30 PROCEDURE — 6360000002 HC RX W HCPCS: Performed by: PHYSICIAN ASSISTANT

## 2020-06-30 PROCEDURE — 80076 HEPATIC FUNCTION PANEL: CPT

## 2020-06-30 PROCEDURE — 76830 TRANSVAGINAL US NON-OB: CPT

## 2020-06-30 PROCEDURE — 1200000000 HC SEMI PRIVATE

## 2020-06-30 PROCEDURE — 99233 SBSQ HOSP IP/OBS HIGH 50: CPT | Performed by: INTERNAL MEDICINE

## 2020-06-30 RX ORDER — LOSARTAN POTASSIUM 100 MG/1
100 TABLET ORAL DAILY
Status: DISCONTINUED | OUTPATIENT
Start: 2020-06-30 | End: 2020-07-03 | Stop reason: HOSPADM

## 2020-06-30 RX ORDER — HYDROCHLOROTHIAZIDE 25 MG/1
25 TABLET ORAL DAILY
Status: DISCONTINUED | OUTPATIENT
Start: 2020-06-30 | End: 2020-07-03 | Stop reason: HOSPADM

## 2020-06-30 RX ADMIN — METHYLPREDNISOLONE SODIUM SUCCINATE 20 MG: 40 INJECTION, POWDER, FOR SOLUTION INTRAMUSCULAR; INTRAVENOUS at 13:32

## 2020-06-30 RX ADMIN — GLYCOPYRROLATE AND FORMOTEROL FUMARATE 2 PUFF: 9; 4.8 AEROSOL, METERED RESPIRATORY (INHALATION) at 19:26

## 2020-06-30 RX ADMIN — METOPROLOL TARTRATE 25 MG: 25 TABLET, FILM COATED ORAL at 21:32

## 2020-06-30 RX ADMIN — Medication 100 MG: at 09:32

## 2020-06-30 RX ADMIN — GABAPENTIN 300 MG: 300 CAPSULE ORAL at 09:32

## 2020-06-30 RX ADMIN — FOLIC ACID 1 MG: 1 TABLET ORAL at 09:33

## 2020-06-30 RX ADMIN — GABAPENTIN 300 MG: 300 CAPSULE ORAL at 13:32

## 2020-06-30 RX ADMIN — THERA TABS 1 TABLET: TAB at 09:32

## 2020-06-30 RX ADMIN — TIOTROPIUM BROMIDE INHALATION SPRAY 2 PUFF: 3.12 SPRAY, METERED RESPIRATORY (INHALATION) at 08:17

## 2020-06-30 RX ADMIN — PANTOPRAZOLE SODIUM 40 MG: 40 TABLET, DELAYED RELEASE ORAL at 05:09

## 2020-06-30 RX ADMIN — LOSARTAN POTASSIUM 100 MG: 100 TABLET, FILM COATED ORAL at 13:35

## 2020-06-30 RX ADMIN — ENOXAPARIN SODIUM 70 MG: 80 INJECTION SUBCUTANEOUS at 09:31

## 2020-06-30 RX ADMIN — GABAPENTIN 300 MG: 300 CAPSULE ORAL at 21:31

## 2020-06-30 RX ADMIN — Medication 10 ML: at 09:38

## 2020-06-30 RX ADMIN — PANTOPRAZOLE SODIUM 40 MG: 40 TABLET, DELAYED RELEASE ORAL at 16:45

## 2020-06-30 RX ADMIN — Medication 10 ML: at 21:33

## 2020-06-30 RX ADMIN — METHYLPREDNISOLONE SODIUM SUCCINATE 20 MG: 40 INJECTION, POWDER, FOR SOLUTION INTRAMUSCULAR; INTRAVENOUS at 03:07

## 2020-06-30 RX ADMIN — HYDROCHLOROTHIAZIDE 25 MG: 25 TABLET ORAL at 13:35

## 2020-06-30 RX ADMIN — GLYCOPYRROLATE AND FORMOTEROL FUMARATE 2 PUFF: 9; 4.8 AEROSOL, METERED RESPIRATORY (INHALATION) at 08:17

## 2020-06-30 RX ADMIN — ATORVASTATIN CALCIUM 80 MG: 80 TABLET, FILM COATED ORAL at 09:32

## 2020-06-30 ASSESSMENT — PAIN SCALES - GENERAL
PAINLEVEL_OUTOF10: 0
PAINLEVEL_OUTOF10: 0
PAINLEVEL_OUTOF10: 3
PAINLEVEL_OUTOF10: 5
PAINLEVEL_OUTOF10: 0
PAINLEVEL_OUTOF10: 0

## 2020-06-30 ASSESSMENT — PAIN DESCRIPTION - PAIN TYPE
TYPE: ACUTE PAIN

## 2020-06-30 ASSESSMENT — PAIN DESCRIPTION - LOCATION
LOCATION: ABDOMEN
LOCATION: ABDOMEN

## 2020-06-30 ASSESSMENT — PAIN DESCRIPTION - INTENSITY: RATING_2: 0

## 2020-06-30 ASSESSMENT — PAIN DESCRIPTION - ORIENTATION
ORIENTATION: LEFT;LOWER
ORIENTATION: LEFT;LOWER

## 2020-06-30 ASSESSMENT — PAIN DESCRIPTION - PROGRESSION: CLINICAL_PROGRESSION: GRADUALLY IMPROVING

## 2020-06-30 ASSESSMENT — PAIN DESCRIPTION - FREQUENCY: FREQUENCY: INTERMITTENT

## 2020-06-30 ASSESSMENT — PAIN DESCRIPTION - ONSET: ONSET: ON-GOING

## 2020-06-30 NOTE — PLAN OF CARE
Shift     Problem: Discharge Planning:  Goal: Patients continuum of care needs are met  Description: Patients continuum of care needs are met  Outcome: Ongoing     Problem: Falls - Risk of:  Goal: Will remain free from falls  Description: Will remain free from falls  6/30/2020 1404 by Rosalio Escalona RN  Outcome: Ongoing  6/30/2020 0008 by Evert Sloan RN  Outcome: Met This Shift     Problem: Skin Integrity:  Goal: Will show no infection signs and symptoms  Description: Will show no infection signs and symptoms  Outcome: Ongoing

## 2020-06-30 NOTE — PROGRESS NOTES
[facility] Daily Progress Note      Admit Date:  6/25/2020    Chief Complaint   Patient presents with    Abdominal Pain     Pt states has a uti, antibiotics, and has hx of high liver enzymes and pancreas issues. Pt states abdominal pain x months, worse today and patient states feels like her equilibrium is off as well. Subjective:  Ms. Karla Owens denies exertional chest pain, SOB/HATFIELD, PND, palpitations, light-headedness, or edema.      Objective:   BP (!) 157/68   Pulse 57   Temp 96.8 °F (36 °C) (Temporal)   Resp 18   Ht 5' 6\" (1.676 m)   Wt 155 lb 3.3 oz (70.4 kg)   SpO2 96%   BMI 25.05 kg/m²       Intake/Output Summary (Last 24 hours) at 6/30/2020 1236  Last data filed at 6/30/2020 0929  Gross per 24 hour   Intake 490 ml   Output --   Net 490 ml       TELEMETRY: Sinus     Physical Exam:  General:  Awake, alert, oriented x 3, NAD  Skin:  Warm and dry  Neck:  JVD flat  Chest:  normal air entry  Cardiovascular:  RRR S1S2, no S3, no mrmr  Abdomen:  Soft, ND, NT, No HSM  Extremities:  No edema    Medications:    methylPREDNISolone  20 mg Intravenous Q12H    multivitamin  1 tablet Oral Daily    thiamine  100 mg Oral Daily    folic acid  1 mg Oral Daily    pantoprazole  40 mg Oral BID AC    atorvastatin  80 mg Oral Daily    gabapentin  300 mg Oral TID    sodium chloride flush  10 mL Intravenous 2 times per day    enoxaparin  1 mg/kg Subcutaneous BID    tiotropium  2 puff Inhalation Daily    glycopyrrolate-formoterol  2 puff Inhalation BID       ipratropium-albuterol, aluminum & magnesium hydroxide-simethicone, sodium chloride flush, acetaminophen **OR** acetaminophen, polyethylene glycol, promethazine **OR** ondansetron, potassium chloride **OR** potassium alternative oral replacement **OR** potassium chloride, magnesium sulfate, nitroGLYCERIN, morphine, perflutren lipid microspheres    Lab Data:  CBC:   Recent Labs     06/28/20  0604 06/29/20  0438   WBC 3.8* 4.3   HGB 10.5* 11.0*

## 2020-06-30 NOTE — PROGRESS NOTES
elevated and abnormal EKG: Unclear etiology. Seen cardiology. No chest pains. Myoview negative for ischemia    CAD (coronary artery disease): No chest pains or shortness of breath. Continues on statin. Continue home metoprolol, aspirin, hydrochlorothiazide    COPD (chronic obstructive pulmonary disease) ([facility]): Without exacerbation. Continue on home inhalers    HTN (hypertension): Stable. Continue on metoprolol and hydrochlorothiazide    Cir[facility]osis ([facility] Unclear etiology in the setting of prior history of alcohol abuse. Positive DIAZ. History of alcohol abuse: No recent use. Continue on vitamins    COVID-19 virus not detected        Resolved Problems:    Hyperkalemia    Hypokalemia          Medications:  Reviewed  Infusion Medications   Scheduled Medications    [START ON 7/1/2020] enoxaparin  40 mg Subcutaneous Daily    losartan  100 mg Oral Daily    metoprolol tartrate  25 mg Oral BID    methylPREDNISolone  20 mg Intravenous Q12H    multivitamin  1 tablet Oral Daily    thiamine  100 mg Oral Daily    folic acid  1 mg Oral Daily    pantoprazole  40 mg Oral BID AC    atorvastatin  80 mg Oral Daily    gabapentin  300 mg Oral TID    sodium chloride flush  10 mL Intravenous 2 times per day    tiotropium  2 puff Inhalation Daily    glycopyrrolate-formoterol  2 puff Inhalation BID     PRN Meds: ipratropium-albuterol, aluminum & magnesium hydroxide-simethicone, sodium chloride flush, acetaminophen **OR** acetaminophen, polyethylene glycol, promethazine **OR** ondansetron, potassium chloride **OR** potassium alternative oral replacement **OR** potassium chloride, magnesium sulfate, nitroGLYCERIN, morphine, perflutren lipid microspheres      DVT Prophylaxis: Subcut enoxaparin  Diet: Diet NPO, After Midnight  DIET CARDIAC;  Code Status: Full Code    Dispo:  Anticipate discharge after biopsy if remains stable    ____________________________________________________________________________    Subjective:

## 2020-06-30 NOTE — CONSULTS
Department of Gynecology  Consult Note      Reason for Consult:  Pelvic Pain  Requesting Physician:  Artemio Pires MD    CHIEF COMPLAINT:   Pelvic pain    History obtained from patient, electronic medical record    HISTORY OF PRESENT ILLNESS:     The patient is a 76 y.o.  female with significant past medical history of supracervical hysterectomy with retention of ovaries currently admitted for Transaminitis and pelvic pain. Pt states her pain has been present for 8 mo. She was seen by Dr. Rachel Paredes for the pain, pap done. No imaging.      Past Medical History:        Diagnosis Date    ETOH abuse     HTN (hypertension)     Pancreatitis     Shingles     Bowel obstruction    Past Surgical History:        Procedure Laterality Date    ABDOMEN SURGERY     Supracervcal Hysterectomy   Section x 2          Past Gynecological History:    Last Pap smear 8 mo ago, Dr. Rachel Paredes      meds:  Current Facility-Administered Medications:   Wero Parr ON 2020] enoxaparin (LOVENOX) injection 40 mg, 40 mg, Subcutaneous, Daily, Lilibeth Zayas MD    losartan (COZAAR) tablet 100 mg, 100 mg, Oral, Daily, Lilibeth Zayas MD, 100 mg at 20 1335    metoprolol tartrate (LOPRESSOR) tablet 25 mg, 25 mg, Oral, BID, Lilibeth Zayas MD    hydroCHLOROthiazide (HYDRODIURIL) tablet 25 mg, 25 mg, Oral, Daily, Lilibeth Zayas MD, 25 mg at 20 1335    methylPREDNISolone sodium (SOLU-MEDROL) injection 20 mg, 20 mg, Intravenous, X73J, Chelle England PA-C, 20 mg at 20 1332    ipratropium-albuterol (DUONEB) nebulizer solution 1 ampule, 1 ampule, Inhalation, Q4H PRN, Fili Cuenca MD, 1 ampule at 20 1815    multivitamin 1 tablet, 1 tablet, Oral, Daily, Dakotah Salomon MD, 1 tablet at 20 0932    vitamin B-1 (THIAMINE) tablet 100 mg, 100 mg, Oral, Daily, Dakotah Salomon MD, 100 mg at  6499    folic acid (FOLVITE) tablet 1 mg, 1 mg, Oral, Daily, Dakotah Salomon MD, 1 mg at 06/30/20 0933    pantoprazole (PROTONIX) tablet 40 mg, 40 mg, Oral, BID AC, Dequan Dubon MD, 40 mg at 06/30/20 0509    aluminum & magnesium hydroxide-simethicone (MAALOX) 200-200-20 MG/5ML suspension 30 mL, 30 mL, Oral, Q6H PRN, Marily Ozuna MD, 30 mL at 06/27/20 2234    atorvastatin (LIPITOR) tablet 80 mg, 80 mg, Oral, Daily, Lv Ozuna MD, 80 mg at 06/30/20 0932    gabapentin (NEURONTIN) capsule 300 mg, 300 mg, Oral, TID, Dequan Dubon MD, 300 mg at 06/30/20 1332    sodium chloride flush 0.9 % injection 10 mL, 10 mL, Intravenous, 2 times per day, Dequan Dubon MD, 10 mL at 06/30/20 8355    sodium chloride flush 0.9 % injection 10 mL, 10 mL, Intravenous, PRN, Dequan Dubon MD    acetaminophen (TYLENOL) tablet 650 mg, 650 mg, Oral, Q6H PRN, 650 mg at 06/29/20 0511 **OR** acetaminophen (TYLENOL) suppository 650 mg, 650 mg, Rectal, Q6H PRN, Dequan Dubon MD    polyethylene glycol (GLYCOLAX) packet 17 g, 17 g, Oral, Daily PRN, Dequan Dubon MD    promethazine (PHENERGAN) tablet 12.5 mg, 12.5 mg, Oral, Q6H PRN **OR** ondansetron (ZOFRAN) injection 4 mg, 4 mg, Intravenous, Q6H PRN, Dequan Dubon MD    potassium chloride (KLOR-CON M) extended release tablet 40 mEq, 40 mEq, Oral, PRN **OR** potassium bicarb-citric acid (EFFER-K) effervescent tablet 40 mEq, 40 mEq, Oral, PRN **OR** potassium chloride 10 mEq/100 mL IVPB (Peripheral Line), 10 mEq, Intravenous, PRN, Dequan Dubon MD    magnesium sulfate 2 g in 50 mL IVPB premix, 2 g, Intravenous, PRN, Dequan Dubon MD    nitroGLYCERIN (NITROSTAT) SL tablet 0.4 mg, 0.4 mg, Sublingual, Q5 Min PRN, Dequan Dubon MD    morphine injection 4 mg, 4 mg, Intravenous, Q4H PRN, Dequan Dubon MD    perflutren lipid microspheres (DEFINITY) injection 1.65 mg, 1.5 mL, Intravenous, ONCE PRN, Dequan Dubon MD    tiotropium (SPIRIVA RESPIMAT) 2.5 MCG/ACT inhaler 2 puff, 2 puff, Inhalation, Daily, Melina Watson MD, 2 puff at 06/30/20 0837  

## 2020-06-30 NOTE — PROGRESS NOTES
[facility] GI  Gastroenterology Progress Note  Juan Gill is a 76 y.o. female patient. 1. Elevated troponin    2. Generalized abdominal pain    3. Cirrhosis of liver without ascites, unspecified hepatic cirrhosis type ([address]    4. Transaminitis    5. Elevated lipase    6. EKG abnormalities        SUBJECTIVE:   No abdominal pain. Physical    VITALS:  BP (!) 157/68   Pulse 57   Temp 96.8 °F (36 °C) (Temporal)   Resp 18   Ht 5' 6\" (1.676 m)   Wt 155 lb 3.3 oz (70.4 kg)   SpO2 96%   BMI 25.05 kg/m²   TEMPERATURE:  Current - Temp: 96.8 °F (36 °C); Max - Temp  Av.5 °F (36.4 °C)  Min: 96.8 °F (36 °C)  Max: 97.9 °F (36.6 °C)    Abdomen soft, ND, NT, no HSM, Bowel sounds normal   Rrr. Nl s1s2  aaox 3 no asterixis. No jaundice  Data      Recent Labs     20  0604 20  0438   WBC 3.8* 4.3   HGB 10.5* 11.0*   HCT 31.2* 32.5*   MCV 95.7 95.7    167     Recent Labs     20  0604 20  0438    138   K 4.0 3.9    109   CO2 27 23   BUN 11 9   CREATININE 0.8 0.7     Recent Labs     20  0604 20  0438 20  0927   * 640* 594*   * 480* 493*   BILIDIR 0.9* 1.1* 0.8*   BILITOT 1.5* 1.8* 1.6*   ALKPHOS 204* 221* 254*     Recent Labs     20  0604 20  0438   LIPASE 86.0* 90.0*           ASSESSMENT   1. Elevated liver enzymes-   Some of this possible from etoh as AST>ALT but some other factor involved as Alcohol hepatitis does not get transamines up into 800s typically and only mild alcohol use recently. DIAZ positive at 1:160. f-actin weak positive at 27. This is concerning for autoimmune hepatitis. 2. Cirrhosis from etoh. MELD  11.   3. Chronic calcific pancreatitis. 4. URI- Covid negative x2 here.      PLAN    - plan liver biopsy per IR Thursday when off ASA 3 days  - solumedrol 20 mg IV BID for AIH  - Complete alcohol cessation  - outpt egd for variceal screening.       Discussed with Dr. Emily June, [address] [facility] and [facility] [place]      I have personally performed a face to face diagnostic evaluation on this patient. I have interviewed and examined the patient and I agree with the findings and recommended plan of care. In summary, my findings and plan are the following:  Pt with cirrhosis from etoh but now with significant and persistent hepatitis with .  + DIAZ 1:160, + F Actin (weakly positive). Started steroids for AIH but will still plan for liver bx given the underlying cirrhosis. Ab nt and pt aaox 3 no asterixis.        Newton Kumar MD  [address] [facility]

## 2020-06-30 NOTE — PLAN OF CARE
Problem: Infection:  Goal: Will remain free from infection  Description: Will remain free from infection  Outcome: Met This Shift     Problem: Safety:  Goal: Free from accidental physical injury  Description: Free from accidental physical injury  Outcome: Met This Shift  Goal: Free from intentional harm  Description: Free from intentional harm  Outcome: Met This Shift     Problem: Daily Care:  Goal: Daily care needs are met  Description: Daily care needs are met  Outcome: Met This Shift     Problem: Pain:  Description: Pain management should include both nonpharmacologic and pharmacologic interventions. Goal: Patient's pain/discomfort is manageable  Description: Patient's pain/discomfort is manageable  Outcome: Met This Shift  Goal: Pain level will decrease  Description: Pain level will decrease  Outcome: Ongoing  Goal: Control of acute pain  Description: Control of acute pain  Outcome: Ongoing  Note:   Patient will verbalize decrease in pain to comfortable level with the use of pharmacologic and non-pharmacologic methods. 0-10 pain rating scale reviewed with patient. Pain reductions strategies discussed. Patient verbalized understanding, demonstrates knowledge of plan of care.        Problem: Skin Integrity:  Goal: Skin integrity will stabilize  Description: Skin integrity will stabilize  Outcome: Met This Shift     Problem: Falls - Risk of:  Goal: Will remain free from falls  Description: Will remain free from falls  Outcome: Met This Shift

## 2020-07-01 LAB
ALBUMIN SERPL-MCNC: 2.9 G/DL (ref 3.4–5)
ALP BLD-CCNC: 260 U/L (ref 40–129)
ALT SERPL-CCNC: 463 U/L (ref 10–40)
AST SERPL-CCNC: 466 U/L (ref 15–37)
BILIRUB SERPL-MCNC: 1.3 MG/DL (ref 0–1)
BILIRUBIN DIRECT: 0.6 MG/DL (ref 0–0.3)
BILIRUBIN, INDIRECT: 0.7 MG/DL (ref 0–1)
CA 125: 15.4 U/ML (ref 0–35)
F-ACTIN AB IGG: 27 UNITS (ref 0–19)
INR BLD: 1.02 (ref 0.86–1.14)
PROTHROMBIN TIME: 11.8 SEC (ref 10–13.2)
SMOOTH MUSCLE AB IGG TITER: ABNORMAL
TOTAL PROTEIN: 7 G/DL (ref 6.4–8.2)

## 2020-07-01 PROCEDURE — 6370000000 HC RX 637 (ALT 250 FOR IP): Performed by: INTERNAL MEDICINE

## 2020-07-01 PROCEDURE — 85610 PROTHROMBIN TIME: CPT

## 2020-07-01 PROCEDURE — 2580000003 HC RX 258: Performed by: INTERNAL MEDICINE

## 2020-07-01 PROCEDURE — 94761 N-INVAS EAR/PLS OXIMETRY MLT: CPT

## 2020-07-01 PROCEDURE — 94640 AIRWAY INHALATION TREATMENT: CPT

## 2020-07-01 PROCEDURE — 80076 HEPATIC FUNCTION PANEL: CPT

## 2020-07-01 PROCEDURE — 6360000002 HC RX W HCPCS: Performed by: PHYSICIAN ASSISTANT

## 2020-07-01 PROCEDURE — 36415 COLL VENOUS BLD VENIPUNCTURE: CPT

## 2020-07-01 PROCEDURE — 1200000000 HC SEMI PRIVATE

## 2020-07-01 RX ORDER — MORPHINE SULFATE 4 MG/ML
4 INJECTION, SOLUTION INTRAMUSCULAR; INTRAVENOUS EVERY 4 HOURS PRN
Status: DISCONTINUED | OUTPATIENT
Start: 2020-07-01 | End: 2020-07-03 | Stop reason: HOSPADM

## 2020-07-01 RX ORDER — HYDROCODONE BITARTRATE AND ACETAMINOPHEN 5; 325 MG/1; MG/1
1 TABLET ORAL EVERY 6 HOURS PRN
Status: DISCONTINUED | OUTPATIENT
Start: 2020-07-01 | End: 2020-07-03 | Stop reason: HOSPADM

## 2020-07-01 RX ADMIN — Medication 10 ML: at 08:37

## 2020-07-01 RX ADMIN — Medication 10 ML: at 20:53

## 2020-07-01 RX ADMIN — METHYLPREDNISOLONE SODIUM SUCCINATE 20 MG: 40 INJECTION, POWDER, FOR SOLUTION INTRAMUSCULAR; INTRAVENOUS at 04:54

## 2020-07-01 RX ADMIN — Medication 100 MG: at 08:35

## 2020-07-01 RX ADMIN — GABAPENTIN 300 MG: 300 CAPSULE ORAL at 14:14

## 2020-07-01 RX ADMIN — THERA TABS 1 TABLET: TAB at 08:36

## 2020-07-01 RX ADMIN — GLYCOPYRROLATE AND FORMOTEROL FUMARATE 2 PUFF: 9; 4.8 AEROSOL, METERED RESPIRATORY (INHALATION) at 10:50

## 2020-07-01 RX ADMIN — PANTOPRAZOLE SODIUM 40 MG: 40 TABLET, DELAYED RELEASE ORAL at 06:40

## 2020-07-01 RX ADMIN — TIOTROPIUM BROMIDE INHALATION SPRAY 2 PUFF: 3.12 SPRAY, METERED RESPIRATORY (INHALATION) at 10:50

## 2020-07-01 RX ADMIN — GABAPENTIN 300 MG: 300 CAPSULE ORAL at 20:52

## 2020-07-01 RX ADMIN — GLYCOPYRROLATE AND FORMOTEROL FUMARATE 2 PUFF: 9; 4.8 AEROSOL, METERED RESPIRATORY (INHALATION) at 19:35

## 2020-07-01 RX ADMIN — METHYLPREDNISOLONE SODIUM SUCCINATE 20 MG: 40 INJECTION, POWDER, FOR SOLUTION INTRAMUSCULAR; INTRAVENOUS at 14:14

## 2020-07-01 RX ADMIN — LOSARTAN POTASSIUM 100 MG: 100 TABLET, FILM COATED ORAL at 08:35

## 2020-07-01 RX ADMIN — GABAPENTIN 300 MG: 300 CAPSULE ORAL at 08:35

## 2020-07-01 RX ADMIN — HYDROCHLOROTHIAZIDE 25 MG: 25 TABLET ORAL at 08:35

## 2020-07-01 RX ADMIN — PANTOPRAZOLE SODIUM 40 MG: 40 TABLET, DELAYED RELEASE ORAL at 15:54

## 2020-07-01 RX ADMIN — METOPROLOL TARTRATE 25 MG: 25 TABLET, FILM COATED ORAL at 20:52

## 2020-07-01 RX ADMIN — HYDROCODONE BITARTRATE AND ACETAMINOPHEN 1 TABLET: 5; 325 TABLET ORAL at 11:00

## 2020-07-01 RX ADMIN — FOLIC ACID 1 MG: 1 TABLET ORAL at 08:36

## 2020-07-01 RX ADMIN — ATORVASTATIN CALCIUM 80 MG: 80 TABLET, FILM COATED ORAL at 08:35

## 2020-07-01 ASSESSMENT — PAIN SCALES - GENERAL
PAINLEVEL_OUTOF10: 2
PAINLEVEL_OUTOF10: 0
PAINLEVEL_OUTOF10: 4
PAINLEVEL_OUTOF10: 1
PAINLEVEL_OUTOF10: 4
PAINLEVEL_OUTOF10: 0
PAINLEVEL_OUTOF10: 0
PAINLEVEL_OUTOF10: 4
PAINLEVEL_OUTOF10: 0
PAINLEVEL_OUTOF10: 0

## 2020-07-01 ASSESSMENT — PAIN DESCRIPTION - ORIENTATION
ORIENTATION: MID;LOWER
ORIENTATION: LOWER;MID

## 2020-07-01 ASSESSMENT — PAIN DESCRIPTION - PAIN TYPE
TYPE: ACUTE PAIN

## 2020-07-01 ASSESSMENT — PAIN DESCRIPTION - LOCATION
LOCATION: ABDOMEN
LOCATION: ABDOMEN
LOCATION: HEAD

## 2020-07-01 ASSESSMENT — PAIN DESCRIPTION - INTENSITY
RATING_2: 0
RATING_2: 0

## 2020-07-01 ASSESSMENT — PAIN DESCRIPTION - DESCRIPTORS
DESCRIPTORS: DULL;CRAMPING
DESCRIPTORS: DULL

## 2020-07-01 NOTE — PROGRESS NOTES
Nutrition Assessment (Low Risk)    Type and Reason for Visit: Initial(LOS assessment )    Nutrition Recommendations:   No nutrition related recommendations at this time    Nutrition Assessment:   Patient assessed for nutritional risk. Pt consuming % of meals on cardiac diet. Deemed to be at low risk at this time. Will continue to monitor for changes in status.     Malnutrition Assessment:  · Malnutrition Status: No malnutrition    Nutrition Risk Level   Risk Level: Low    Nutrition Diagnosis:   · Problem: No nutrition diagnosis at this time    Nutrition Intervention:  Food and/or Delivery: Continue current diet  Nutrition Education/Counseling/Coordination of Care:  Continued Inpatient Monitoring, Education Not Indicated      Electronically signed by Lisset Holley RD, LD on 7/1/20 at 1:30 PM EDT    Contact Number: [phone]

## 2020-07-01 NOTE — PROGRESS NOTES
Shift assessment done, see flow sheet. Medication administrated per MAR. The care plan and education reviewed and mutually agree upon with the patient. Patient alert and oriented, independent.  Will continue to monitor

## 2020-07-01 NOTE — PLAN OF CARE
Problem: Infection:  Goal: Will remain free from infection  Description: Will remain free from infection  Outcome: Ongoing     Problem: Safety:  Goal: Free from accidental physical injury  Description: Free from accidental physical injury  Outcome: Ongoing  Goal: Free from intentional harm  Description: Free from intentional harm  Outcome: Ongoing     Problem: Daily Care:  Goal: Daily care needs are met  Description: Daily care needs are met  Outcome: Ongoing     Problem: Pain:  Goal: Patient's pain/discomfort is manageable  Description: Patient's pain/discomfort is manageable  Outcome: Ongoing  Goal: Pain level will decrease  Description: Pain level will decrease  Outcome: Ongoing  Goal: Control of acute pain  Description: Control of acute pain  Outcome: Ongoing  Goal: Control of chronic pain  Description: Control of chronic pain  Outcome: Ongoing     Problem: Skin Integrity:  Goal: Skin integrity will stabilize  Description: Skin integrity will stabilize  Outcome: Ongoing     Problem: Discharge Planning:  Goal: Patients continuum of care needs are met  Description: Patients continuum of care needs are met  Outcome: Ongoing     Problem: Falls - Risk of:  Goal: Will remain free from falls  Description: Will remain free from falls  Outcome: Ongoing  Goal: Absence of physical injury  Description: Absence of physical injury  Outcome: Ongoing     Problem: Skin Integrity:  Goal: Will show no infection signs and symptoms  Description: Will show no infection signs and symptoms  Outcome: Ongoing  Goal: Absence of new skin breakdown  Description: Absence of new skin breakdown  Outcome: Ongoing

## 2020-07-01 NOTE — PROGRESS NOTES
[facility] GI  Gastroenterology Progress Note  Shiraz Reyes is a 76 y.o. female patient. 1. Elevated troponin    2. Generalized abdominal pain    3. Cirrhosis of liver without ascites, unspecified hepatic cirrhosis type ([address]    4. Transaminitis    5. Elevated lipase    6. EKG abnormalities        SUBJECTIVE:   No abdominal pain. Physical    VITALS:  BP (!) 147/70   Pulse 67   Temp 97.2 °F (36.2 °C) (Temporal)   Resp 18   Ht 5' 6\" (1.676 m)   Wt 155 lb 10.3 oz (70.6 kg)   SpO2 93%   BMI 25.12 kg/m²   TEMPERATURE:  Current - Temp: 97.2 °F (36.2 °C); Max - Temp  Av.5 °F (36.4 °C)  Min: 96.9 °F (36.1 °C)  Max: 97.9 °F (36.6 °C)    Abdomen soft, ND, NT, no HSM, Bowel sounds normal   Rrr. Nl s1s2  aaox 3 no asterixis. No jaundice  Data      Recent Labs     20  0438   WBC 4.3   HGB 11.0*   HCT 32.5*   MCV 95.7        Recent Labs     20  0438      K 3.9      CO2 23   BUN 9   CREATININE 0.7     Recent Labs     20  0438 20  0927 20  0358   * 594* 466*   * 493* 463*   BILIDIR 1.1* 0.8* 0.6*   BILITOT 1.8* 1.6* 1.3*   ALKPHOS 221* 254* 260*     Recent Labs     20  0438   LIPASE 90.0*           ASSESSMENT   1. Elevated liver enzymes-  DIAZ positive at 1:160. f-actin weak positive at 27. This is concerning for autoimmune hepatitis. Steroids started  and transaminases are improving. 2. Cirrhosis from etoh. MELD  11.   3. Chronic calcific pancreatitis. 4. URI- Covid negative x2 here.      PLAN    - plan liver biopsy per IR Thursday (off ASA 3 days)  - continue solumedrol 20 mg IV BID for AIH  - Complete alcohol cessation  - outpt egd for variceal screening. Discussed with Dr. Duane Wahl, [address]      I have personally performed a face to face diagnostic evaluation on this patient. I have interviewed and examined the patient and I agree with the findings and recommended plan of care.   In summary,

## 2020-07-01 NOTE — PROGRESS NOTES
Hospitalist Progress Note      PCP: Diaz Reed MD    Date of Admission: 6/25/2020    LOS: 5    Chief Complaint:   Chief Complaint   Patient presents with    Abdominal Pain     Pt states has a uti, antibiotics, and has hx of high liver enzymes and pancreas issues. Pt states abdominal pain x months, worse today and patient states feels like her equilibrium is off as well. Case Summary:   76-year-old lady with history of hypertension, alcoholic pancreatitis, coronary artery disease status post stents, COPD, history of alcohol abuse, who presented with abdominal pain/pelvic pain and found to have elevated troponins to rule out ACS. She was also found to have transaminitis of unclear etiology. Laboratory work-up noted for positive DIAZ  COVID-19 negative x2 one on 6/26/2020 and repeated on 6/28/2020      Active Hospital Problems    Diagnosis Date Noted    Autoimmune hepatitis ([facility] [K75.4] 06/30/2020    DIAZ positive [R76.8] 06/28/2020    Troponin level elevated [R79.89] 06/26/2020    Abdominal pain [R10.9] 06/26/2020    Transaminitis [R74.0] 06/26/2020    CAD (coronary artery disease) [I25.10] 06/26/2020    [facility] (chronic obstructive [facility] disease) [facility] [J44.9] 06/26/2020    HTN (hypertension) [I10] 06/26/2020    Cirrhosis ([facility] [K74.60] 06/26/2020    Alcohol abuse [F10.10] 06/26/2020    COVID-19 virus not detected [Z03.818] 06/26/2020    Abnormal EKG [R94.31] 06/26/2020         Principal Problem:    Probable autoimmune hepatitis with transaminitis and DIAZ positive: elevated DIAZ. Seen GI LFTs continue to trend down.  -Continues on IV steroids  -Monitor liver function profile  -Plan for biopsy tomorrow  - GI following      Chronic left pelvic pain:  abdominal CT on presentation did not reveal any pelvic pathology. Pelvic and transvaginal ultrasound unrevealing. Seen by gynecology with no new improved.   -Pain management    Active Problems:    Troponin level elevated and abnormal biopsy    ____________________________________________________________________________    Subjective:   Overnight Events:   Uneventful overnight  Continues complain of left pelvic pain      Physical Exam:  /71   Pulse 89   Temp 96.8 °F (36 °C) (Temporal)   Resp 18   Ht 5' 6\" (1.676 m)   Wt 155 lb 10.3 oz (70.6 kg)   SpO2 97%   BMI 25.12 kg/m²   General appearance: No apparent distress, appears stated age and cooperative. HEENT: Normocephalic, atraumatic, MMM, No sclera icterus/conjuctival palor  Neck: Supple, no thyromegally. No jugular venous distention. Respiratory:  Normal respiratory effort. Clear to auscultation, no Rales/Wheezes/Rhonchi. Cardiovascular: S1/S2 without murmurs, rubs or gallops. RRR  Abdomen: Soft, left pelvic tenderness, non-distended, bowel sounds present. Musculoskeletal: No clubbing, cyanosis or edema bilaterally. Skin: Skin color, texture, turgor normal.  No rashes or lesions. Neurologic:  Cranial nerves: II-XII intact, NYA, No focal sensory/motor deficits  Psychiatric: Alert and oriented, thought content appropriate  Capillary Refill: Brisk,< 3 seconds   Peripheral Pulses: +2 palpable, equal bilaterally       Intake/Output Summary (Last 24 hours) at 7/1/2020 1332  Last data filed at 7/1/2020 0951  Gross per 24 hour   Intake 1330 ml   Output 50 ml   Net 1280 ml       Labs:   Recent Labs     06/29/20  0438   WBC 4.3   HGB 11.0*   HCT 32.5*         Recent Labs     06/29/20  0438 06/30/20  0927 07/01/20  0358     --   --    K 3.9  --   --      --   --    CO2 23  --   --    BUN 9  --   --    CREATININE 0.7  --   --    CALCIUM 8.7  --   --    * 594* 466*   * 493* 463*   BILIDIR 1.1* 0.8* 0.6*   BILITOT 1.8* 1.6* 1.3*   ALKPHOS 221* 254* 260*     No results for input(s): [facility] in the last 72 hours.     Urinalysis:    Lab Results   Component Value Date    NITRU Negative 06/26/2020    WBCUA 9 06/26/2020    RBCUA 2 06/26/2020 BLOODU LARGE 06/26/2020    SPECGRAV 1.022 06/26/2020    GLUCOSEU Negative 06/26/2020       Radiology:  US PELVIS COMPLETE   Final Result   Probable supracervical hysterectomy. Nonvisualization of right ovary. Slightly complex cystic structure within the left adnexa which may be ovarian   or paraovarian in nature. This has a appearance of a simple cyst with simple   septations on transabdominal imaging. This appears to correlate with   findings on CT from 06/26/2020. Follow-up at 1 year would be recommended in the absence of additional   clinical concern, risk factors. Free fluid within the pelvis which is nonspecific. Follow-up can be obtained   with CT abdomen and pelvis with contrast as clinically indicated. US NON OB TRANSVAGINAL   Final Result   Probable supracervical hysterectomy. Nonvisualization of right ovary. Slightly complex cystic structure within the left adnexa which may be ovarian   or paraovarian in nature. This has a appearance of a simple cyst with simple   septations on transabdominal imaging. This appears to correlate with   findings on CT from 06/26/2020. Follow-up at 1 year would be recommended in the absence of additional   clinical concern, risk factors. Free fluid within the pelvis which is nonspecific. Follow-up can be obtained   with CT abdomen and pelvis with contrast as clinically indicated. NM MYOCARDIAL SPECT REST EXERCISE OR RX   Final Result      US ABDOMEN LIMITED Specify organ? LIVER, PANCREAS, GALLBLADDER   Final Result   No cholelithiasis or ancillary evidence of acute cholecystitis. Heterogeneous hepatic echotexture, which can be in keeping with fatty liver   or underlying hepatocellular disease. CT ABDOMEN PELVIS WO CONTRAST Additional Contrast? None   Final Result   No acute abnormality. The liver appears cirrhotic and possibly fibrotic. This can be further evaluated with nonemergent MRI.          XR CHEST PORTABLE   Final Result   Hyperplasia lungs. Bibasilar increased density could be related to bronchovascular padding. Pneumonia could be considered, and particularly given asymmetric right mid   lung increased opacity. US BIOPSY LIVER PERCUTANEOUS    (Results Pending)           Keshawn Espinosa MD      Please excuse brevity and/or typos. This report was transcribed using voice recognition software. Every effort was made to ensure accuracy, however, inadvertent computerized transcription errors may be present.

## 2020-07-01 NOTE — PROGRESS NOTES
Nursing assessment completed. Afebrile. VSS.  NSR. Denies pain. Taking pills with water without difficulty. Medications given. See MAR. Patient remains on monitor. Follows commands appropriately. Right AC PIV patent +blood return and flushes well. SR up x3. Call light in reach. Bed in lowest position and wheels locked.

## 2020-07-01 NOTE — CONSULTS
Gynecology    Pt with a 2.3cm left ovarian cyst.  C/o LLQ pain for the last 8 months. : normal.    A/p: Pt with  LLQ cyst, most likely benign ovarian cyst, recommend fu as an outpatient with her Gynecologist, Dr. Shai Vee, repeat [address] [facility] in 1 yr per Radiology recommendation or sooner if worsening pain. Thank you for the consult, will sign off, please call if any questions or concerns.

## 2020-07-01 NOTE — PROGRESS NOTES
Gynecology service    Pt's EMR reviewed. A/P: 8mo hx LLQ pain, pelvic US and CT: 2.3 x 2.8cm slightly complex cystic area within the left adnexa  Probable benign finding. Waiting on  result which should be back either this evening or by tomorrow am.  Will have a recommendation then.

## 2020-07-02 ENCOUNTER — APPOINTMENT (OUTPATIENT)
Dept: ULTRASOUND IMAGING | Age: 65
DRG: 441 | End: 2020-07-02
Payer: MEDICARE

## 2020-07-02 LAB
ALBUMIN SERPL-MCNC: 2.8 G/DL (ref 3.4–5)
ALP BLD-CCNC: 279 U/L (ref 40–129)
ALT SERPL-CCNC: 417 U/L (ref 10–40)
ANION GAP SERPL CALCULATED.3IONS-SCNC: 9 MMOL/L (ref 3–16)
AST SERPL-CCNC: 357 U/L (ref 15–37)
BILIRUB SERPL-MCNC: 1 MG/DL (ref 0–1)
BILIRUBIN DIRECT: 0.5 MG/DL (ref 0–0.3)
BILIRUBIN, INDIRECT: 0.5 MG/DL (ref 0–1)
BUN BLDV-MCNC: 17 MG/DL (ref 7–20)
CALCIUM SERPL-MCNC: 9.1 MG/DL (ref 8.3–10.6)
CHLORIDE BLD-SCNC: 104 MMOL/L (ref 99–110)
CO2: 25 MMOL/L (ref 21–32)
CREAT SERPL-MCNC: 0.8 MG/DL (ref 0.6–1.2)
GFR AFRICAN AMERICAN: >60
GFR NON-AFRICAN AMERICAN: >60
GLUCOSE BLD-MCNC: 159 MG/DL (ref 70–99)
HCT VFR BLD CALC: 34.7 % (ref 36–48)
HEMOGLOBIN: 11.5 G/DL (ref 12–16)
INR BLD: 1 (ref 0.86–1.14)
MCH RBC QN AUTO: 31.9 PG (ref 26–34)
MCHC RBC AUTO-ENTMCNC: 33.1 G/DL (ref 31–36)
MCV RBC AUTO: 96.6 FL (ref 80–100)
PDW BLD-RTO: 16 % (ref 12.4–15.4)
PLATELET # BLD: 214 K/UL (ref 135–450)
PMV BLD AUTO: 8.5 FL (ref 5–10.5)
POTASSIUM SERPL-SCNC: 3.7 MMOL/L (ref 3.5–5.1)
PROTHROMBIN TIME: 11.6 SEC (ref 10–13.2)
RBC # BLD: 3.6 M/UL (ref 4–5.2)
SODIUM BLD-SCNC: 138 MMOL/L (ref 136–145)
TOTAL PROTEIN: 6.8 G/DL (ref 6.4–8.2)
WBC # BLD: 12.7 K/UL (ref 4–11)

## 2020-07-02 PROCEDURE — 85027 COMPLETE CBC AUTOMATED: CPT

## 2020-07-02 PROCEDURE — 6360000002 HC RX W HCPCS: Performed by: RADIOLOGY

## 2020-07-02 PROCEDURE — 88313 SPECIAL STAINS GROUP 2: CPT

## 2020-07-02 PROCEDURE — 88307 TISSUE EXAM BY PATHOLOGIST: CPT

## 2020-07-02 PROCEDURE — 2709999900 US BIOPSY LIVER PERCUTANEOUS

## 2020-07-02 PROCEDURE — 6370000000 HC RX 637 (ALT 250 FOR IP): Performed by: INTERNAL MEDICINE

## 2020-07-02 PROCEDURE — 94640 AIRWAY INHALATION TREATMENT: CPT

## 2020-07-02 PROCEDURE — 88342 IMHCHEM/IMCYTCHM 1ST ANTB: CPT

## 2020-07-02 PROCEDURE — 2500000003 HC RX 250 WO HCPCS: Performed by: PHYSICIAN ASSISTANT

## 2020-07-02 PROCEDURE — 6360000002 HC RX W HCPCS: Performed by: PHYSICIAN ASSISTANT

## 2020-07-02 PROCEDURE — 6370000000 HC RX 637 (ALT 250 FOR IP): Performed by: PHYSICIAN ASSISTANT

## 2020-07-02 PROCEDURE — 2580000003 HC RX 258: Performed by: INTERNAL MEDICINE

## 2020-07-02 PROCEDURE — 88341 IMHCHEM/IMCYTCHM EA ADD ANTB: CPT

## 2020-07-02 PROCEDURE — 80076 HEPATIC FUNCTION PANEL: CPT

## 2020-07-02 PROCEDURE — 80048 BASIC METABOLIC PNL TOTAL CA: CPT

## 2020-07-02 PROCEDURE — 94761 N-INVAS EAR/PLS OXIMETRY MLT: CPT

## 2020-07-02 PROCEDURE — 1200000000 HC SEMI PRIVATE

## 2020-07-02 PROCEDURE — 36415 COLL VENOUS BLD VENIPUNCTURE: CPT

## 2020-07-02 PROCEDURE — 0FB03ZX EXCISION OF LIVER, PERCUTANEOUS APPROACH, DIAGNOSTIC: ICD-10-PCS | Performed by: RADIOLOGY

## 2020-07-02 PROCEDURE — 85610 PROTHROMBIN TIME: CPT

## 2020-07-02 RX ORDER — BACITRACIN, NEOMYCIN, POLYMYXIN B 400; 3.5; 5 [USP'U]/G; MG/G; [USP'U]/G
OINTMENT TOPICAL ONCE
Status: COMPLETED | OUTPATIENT
Start: 2020-07-02 | End: 2020-07-02

## 2020-07-02 RX ORDER — FENTANYL CITRATE 50 UG/ML
INJECTION, SOLUTION INTRAMUSCULAR; INTRAVENOUS
Status: COMPLETED | OUTPATIENT
Start: 2020-07-02 | End: 2020-07-02

## 2020-07-02 RX ORDER — MIDAZOLAM HYDROCHLORIDE 1 MG/ML
INJECTION INTRAMUSCULAR; INTRAVENOUS
Status: COMPLETED | OUTPATIENT
Start: 2020-07-02 | End: 2020-07-02

## 2020-07-02 RX ORDER — LIDOCAINE HYDROCHLORIDE 10 MG/ML
5 INJECTION, SOLUTION INFILTRATION; PERINEURAL ONCE
Status: COMPLETED | OUTPATIENT
Start: 2020-07-02 | End: 2020-07-02

## 2020-07-02 RX ADMIN — Medication 100 MG: at 16:53

## 2020-07-02 RX ADMIN — GABAPENTIN 300 MG: 300 CAPSULE ORAL at 11:34

## 2020-07-02 RX ADMIN — METOPROLOL TARTRATE 25 MG: 25 TABLET, FILM COATED ORAL at 20:10

## 2020-07-02 RX ADMIN — MIDAZOLAM 0.5 MG: 1 INJECTION INTRAMUSCULAR; INTRAVENOUS at 09:59

## 2020-07-02 RX ADMIN — BACITRACIN ZINC NEOMYCIN SULFATE POLYMYXIN B SULFATE: 400; 3.5; 5 OINTMENT TOPICAL at 10:15

## 2020-07-02 RX ADMIN — METHYLPREDNISOLONE SODIUM SUCCINATE 20 MG: 40 INJECTION, POWDER, FOR SOLUTION INTRAMUSCULAR; INTRAVENOUS at 03:24

## 2020-07-02 RX ADMIN — HYDROCODONE BITARTRATE AND ACETAMINOPHEN 1 TABLET: 5; 325 TABLET ORAL at 11:33

## 2020-07-02 RX ADMIN — PANTOPRAZOLE SODIUM 40 MG: 40 TABLET, DELAYED RELEASE ORAL at 05:50

## 2020-07-02 RX ADMIN — GLYCOPYRROLATE AND FORMOTEROL FUMARATE 2 PUFF: 9; 4.8 AEROSOL, METERED RESPIRATORY (INHALATION) at 23:08

## 2020-07-02 RX ADMIN — METOPROLOL TARTRATE 25 MG: 25 TABLET, FILM COATED ORAL at 11:32

## 2020-07-02 RX ADMIN — FOLIC ACID 1 MG: 1 TABLET ORAL at 11:32

## 2020-07-02 RX ADMIN — FENTANYL CITRATE 25 MCG: 50 INJECTION, SOLUTION INTRAMUSCULAR; INTRAVENOUS at 09:56

## 2020-07-02 RX ADMIN — Medication 10 ML: at 11:33

## 2020-07-02 RX ADMIN — FENTANYL CITRATE 25 MCG: 50 INJECTION, SOLUTION INTRAMUSCULAR; INTRAVENOUS at 09:59

## 2020-07-02 RX ADMIN — GABAPENTIN 300 MG: 300 CAPSULE ORAL at 20:10

## 2020-07-02 RX ADMIN — THERA TABS 1 TABLET: TAB at 11:33

## 2020-07-02 RX ADMIN — TIOTROPIUM BROMIDE INHALATION SPRAY 2 PUFF: 3.12 SPRAY, METERED RESPIRATORY (INHALATION) at 08:33

## 2020-07-02 RX ADMIN — ATORVASTATIN CALCIUM 80 MG: 80 TABLET, FILM COATED ORAL at 11:33

## 2020-07-02 RX ADMIN — GLYCOPYRROLATE AND FORMOTEROL FUMARATE 2 PUFF: 9; 4.8 AEROSOL, METERED RESPIRATORY (INHALATION) at 08:33

## 2020-07-02 RX ADMIN — HYDROCHLOROTHIAZIDE 25 MG: 25 TABLET ORAL at 11:32

## 2020-07-02 RX ADMIN — PANTOPRAZOLE SODIUM 40 MG: 40 TABLET, DELAYED RELEASE ORAL at 16:53

## 2020-07-02 RX ADMIN — LOSARTAN POTASSIUM 100 MG: 100 TABLET, FILM COATED ORAL at 11:32

## 2020-07-02 RX ADMIN — MIDAZOLAM 0.5 MG: 1 INJECTION INTRAMUSCULAR; INTRAVENOUS at 09:56

## 2020-07-02 RX ADMIN — Medication 10 ML: at 20:11

## 2020-07-02 RX ADMIN — METHYLPREDNISOLONE SODIUM SUCCINATE 20 MG: 40 INJECTION, POWDER, FOR SOLUTION INTRAMUSCULAR; INTRAVENOUS at 16:52

## 2020-07-02 RX ADMIN — LIDOCAINE HYDROCHLORIDE 5 ML: 10 INJECTION, SOLUTION INFILTRATION; PERINEURAL at 10:01

## 2020-07-02 ASSESSMENT — PAIN DESCRIPTION - ORIENTATION
ORIENTATION: RIGHT
ORIENTATION: LOWER;LEFT
ORIENTATION: LOWER;LEFT

## 2020-07-02 ASSESSMENT — PAIN SCALES - GENERAL
PAINLEVEL_OUTOF10: 3
PAINLEVEL_OUTOF10: 6
PAINLEVEL_OUTOF10: 0
PAINLEVEL_OUTOF10: 2
PAINLEVEL_OUTOF10: 2
PAINLEVEL_OUTOF10: 3

## 2020-07-02 ASSESSMENT — PAIN DESCRIPTION - PAIN TYPE
TYPE: ACUTE PAIN

## 2020-07-02 ASSESSMENT — PAIN DESCRIPTION - FREQUENCY: FREQUENCY: INTERMITTENT

## 2020-07-02 ASSESSMENT — PAIN DESCRIPTION - LOCATION
LOCATION: ABDOMEN
LOCATION: FLANK
LOCATION: ABDOMEN

## 2020-07-02 ASSESSMENT — PAIN DESCRIPTION - PROGRESSION: CLINICAL_PROGRESSION: GRADUALLY IMPROVING

## 2020-07-02 ASSESSMENT — PAIN DESCRIPTION - ONSET: ONSET: ON-GOING

## 2020-07-02 ASSESSMENT — PAIN DESCRIPTION - DESCRIPTORS: DESCRIPTORS: DISCOMFORT

## 2020-07-02 NOTE — PRE SEDATION
aclidinium (TUDORZA PRESSAIR) 400 MCG/ACT AEPB inhaler Inhale 1 puff into the lungs 2 times daily   Yes Historical Provider, MD   albuterol sulfate HFA (PROAIR HFA) 108 (90 Base) MCG/ACT inhaler Inhale 2 puffs into the lungs every 6 hours as needed for Wheezing   Yes Historical Provider, MD   aspirin 81 MG chewable tablet Take 81 mg by mouth daily   Yes Historical Provider, MD   atorvastatin (LIPITOR) 80 MG tablet Take 80 mg by mouth daily   Yes Historical Provider, MD   gabapentin (NEURONTIN) 300 MG capsule Take 300 mg by mouth 3 times daily. Yes Historical Provider, MD   hydroCHLOROthiazide (HYDRODIURIL) 25 MG tablet Take 25 mg by mouth daily   Yes Historical Provider, MD   HYDROcodone-acetaminophen (NORCO) 7.5-325 MG per tablet Take 1 tablet by mouth every 6 hours as needed for Pain. Yes Historical Provider, MD   ibuprofen (ADVIL;MOTRIN) 800 MG tablet Take 800 mg by mouth every 8 hours as needed for Pain   Yes Historical Provider, MD   ipratropium-albuterol (DUONEB) 0.5-2.5 (3) MG/3ML SOLN nebulizer solution Inhale 1 vial into the lungs every 4 hours   Yes Historical Provider, MD   losartan (COZAAR) 100 MG tablet Take 100 mg by mouth daily   Yes Historical Provider, MD   metoprolol tartrate (LOPRESSOR) 25 MG tablet Take 25 mg by mouth 2 times daily   Yes Historical Provider, MD   umeclidinium-vilanterol (ANORO ELLIPTA) 62.5-25 MCG/INH AEPB inhaler Inhale 1 puff into the lungs daily   Yes Historical Provider, MD   cephALEXin (KEFLEX) 500 MG capsule Take 500 mg by mouth 3 times daily   Yes Historical Provider, MD   SUMAtriptan (IMITREX) 20 MG/ACT nasal spray 1 spray by Nasal route daily as needed for Migraine    Historical Provider, MD     Coumadin Use Last 7 Days:  no  Antiplatelet drug therapy use last 7 days: no  Other anticoagulant use last 7 days: no  Additional Medication Information:        Pre-Sedation Documentation and Exam:   I have reviewed the patient's history and review of systems.     Mallampati

## 2020-07-02 NOTE — PLAN OF CARE
Problem: Infection:  Goal: Will remain free from infection  Description: Will remain free from infection  7/1/2020 2230 by Devin Olson RN  Outcome: Ongoing     Problem: Safety:  Goal: Free from accidental physical injury  Description: Free from accidental physical injury  7/1/2020 2230 by Devin Olson RN  Outcome: Ongoing  Goal: Free from intentional harm  Description: Free from intentional harm  7/1/2020 2230 by Devin Olson RN  Outcome: Ongoing  7/1/2020 0925 by Briseyda Wayne RN  Outcome: Ongoing     Problem: Daily Care:  Goal: Daily care needs are met  Description: Daily care needs are met  7/1/2020 2230 by Devin Olson RN  Outcome: Ongoing     Problem: Pain:  Goal: Patient's pain/discomfort is manageable  Description: Patient's pain/discomfort is manageable  7/1/2020 2230 by Devin Olson RN  Outcome: Ongoing  Goal: Pain level will decrease  Description: Pain level will decrease  7/1/2020 2230 by Devin Olson RN  Outcome: Ongoing  Goal: Control of acute pain  Description: Control of acute pain  7/1/2020 2230 by Devin Olson RN  Outcome: Ongoing  Goal: Control of chronic pain  Description: Control of chronic pain  7/1/2020 2230 by Devin Olson RN  Outcome: Ongoing

## 2020-07-02 NOTE — PROGRESS NOTES
Spoke with radiology nurse. Transport arrange for pt to go to [address] for liver biopsy. Consent signed and on the chart. Pt updated on POC. Denies further questions.  Bathed this morning

## 2020-07-02 NOTE — PROGRESS NOTES
[facility] GI  Gastroenterology Progress Note  Yarely Shafer is a 76 y.o. female patient. 1. Elevated troponin    2. Generalized abdominal pain    3. Cirrhosis of liver without ascites, unspecified hepatic cirrhosis type ([address]    4. Transaminitis    5. Elevated lipase    6. EKG abnormalities        SUBJECTIVE:   Feels well no complaints. No cp, sob, ab pain    Physical    VITALS:  /62   Pulse 57   Temp 96.8 °F (36 °C) (Temporal)   Resp 18   Ht 5' 6\" (1.676 m)   Wt 155 lb 11.2 oz (70.6 kg)   SpO2 92%   BMI 25.13 kg/m²   TEMPERATURE:  Current - Temp: 96.8 °F (36 °C); Max - Temp  Av.2 °F (36.2 °C)  Min: 96.8 °F (36 °C)  Max: 97.6 °F (36.4 °C)    Abdomen soft, ND, NT, no HSM, Bowel sounds normal   cta bilat nl effort.   nad rrr. Data      Recent Labs     20  0453   WBC 12.7*   HGB 11.5*   HCT 34.7*   MCV 96.6        Recent Labs     20  0453      K 3.7      CO2 25   BUN 17   CREATININE 0.8     Recent Labs     20  0927 20  0358 20  0453   * 466* 357*   * 463* 417*   BILIDIR 0.8* 0.6* 0.5*   BILITOT 1.6* 1.3* 1.0   ALKPHOS 254* 260* 279*     No results for input(s): LIPASE, AMYLASE in the last 72 hours. ASSESSMENT   1. Hepatitis- her labs c/w AIH. Slowly decreasing since starting steroids. 2. Cirrhosis, MELD 11.   3. Chronic calcific pancreatitis-         PLAN    1. Cont steroids, will change to PO prednisone 40mg at DC  2. Liver bx today  3. Will have her f/u with Dr. Jael Porras or Dr. Guillermina Bryson as outpt   4. Will need outpt EGD for variceal screening    5. Needs Hep A and B vaccinations  6. Would plan DC tomorrow.      Rober Morel MD  [address]

## 2020-07-02 NOTE — PROGRESS NOTES
Hospitalist Progress Note      PCP: Georgette Argueta MD    Date of Admission: 6/25/2020    LOS: 6    Chief Complaint:   Chief Complaint   Patient presents with    Abdominal Pain     Pt states has a uti, antibiotics, and has hx of high liver enzymes and pancreas issues. Pt states abdominal pain x months, worse today and patient states feels like her equilibrium is off as well. Case Summary:   79-year-old lady with history of hypertension, alcoholic pancreatitis, coronary artery disease status post stents, COPD, history of alcohol abuse, who presented with abdominal pain/pelvic pain and found to have elevated troponins to rule out ACS. She was also found to have transaminitis of unclear etiology. Laboratory work-up noted for positive DIAZ  COVID-19 negative x2 one on 6/26/2020 and repeated on 6/28/2020      Active Hospital Problems    Diagnosis Date Noted    Autoimmune hepatitis ([facility] [K75.4] 06/30/2020    DIAZ positive [R76.8] 06/28/2020    Troponin level elevated [R79.89] 06/26/2020    Abdominal pain [R10.9] 06/26/2020    Transaminitis [R74.0] 06/26/2020    CAD (coronary artery disease) [I25.10] 06/26/2020    COPD (chronic obstructive pulmonary disease) ([facility] [J44.9] 06/26/2020    HTN (hypertension) [I10] 06/26/2020    Cirrhosis ([facility] [K74.60] 06/26/2020    Alcohol abuse [F10.10] 06/26/2020    COVID-19 virus not detected [Z03.818] 06/26/2020    Abnormal EKG [R94.31] 06/26/2020         Principal Problem:    Probable autoimmune hepatitis with transaminitis and DIAZ positive: elevated DIAZ. Seen GI LFTs continue to trend down.  -Underwent liver biopsy this morning. We will follow-up biopsy results in the outpatient  -Continues on IV steroids  -Monitor liver function profile  - GI following      Chronic left pelvic pain:  abdominal CT on presentation did not reveal any pelvic pathology. Pelvic and transvaginal ultrasound unrevealing. Seen by gynecology with no new improved.   -Pain management    Active Problems:    Troponin level elevated and abnormal EKG: Unclear etiology. Seen cardiology. No chest pains. Myoview negative for ischemia    CAD (coronary artery disease): No chest pains or shortness of breath. Continues on statin. Continue home metoprolol, aspirin, hydrochlorothiazide    COPD (chronic obstructive pulmonary disease) ([facility]): Without exacerbation. Continue on home inhalers    HTN (hypertension): Stable. Continue on metoprolol and hydrochlorothiazide    Cirrhosis ([facility]): Unclear etiology in the setting of prior history of alcohol abuse. Positive DIAZ. History of alcohol abuse: No recent use. Continue on vitamins    COVID-19 virus not detected        Resolved Problems:    Hyperkalemia    Hypokalemia          Medications:  Reviewed  Infusion Medications   Scheduled Medications    enoxaparin  40 mg Subcutaneous Daily    losartan  100 mg Oral Daily    metoprolol tartrate  25 mg Oral BID    hydroCHLOROthiazide  25 mg Oral Daily    methylPREDNISolone  20 mg Intravenous Q12H    multivitamin  1 tablet Oral Daily    thiamine  100 mg Oral Daily    folic acid  1 mg Oral Daily    pantoprazole  40 mg Oral BID AC    atorvastatin  80 mg Oral Daily    gabapentin  300 mg Oral TID    sodium chloride flush  10 mL Intravenous 2 times per day    tiotropium  2 puff Inhalation Daily    glycopyrrolate-formoterol  2 puff Inhalation BID     PRN Meds: morphine, HYDROcodone 5 mg - acetaminophen, ipratropium-albuterol, aluminum & magnesium hydroxide-simethicone, sodium chloride flush, acetaminophen **OR** acetaminophen, polyethylene glycol, promethazine **OR** ondansetron, potassium chloride **OR** potassium alternative oral replacement **OR** potassium chloride, magnesium sulfate, nitroGLYCERIN, perflutren lipid microspheres      DVT Prophylaxis: Subcut enoxaparin  Diet: DIET GENERAL;  Code Status: Full Code    Dispo:  Anticipate discharge tomorrow if remains stable and continues to improve    ____________________________________________________________________________    Subjective:   Overnight Events:   Uneventful overnight  Pain controlled with meds      Physical Exam:  BP (!) 171/94   Pulse 50   Temp 96.8 °F (36 °C) (Temporal)   Resp 18   Ht 5' 6\" (1.676 m)   Wt 155 lb 11.2 oz (70.6 kg)   SpO2 97%   BMI 25.13 kg/m²   General appearance: No apparent distress, appears stated age and cooperative. HEENT: Normocephalic, atraumatic, MMM, No sclera icterus/conjuctival palor  Neck: Supple, no thyromegally. No jugular venous distention. Respiratory:  Normal respiratory effort. Clear to auscultation, no Rales/Wheezes/Rhonchi. Cardiovascular: S1/S2 without murmurs, rubs or gallops. RRR  Abdomen: Soft, left pelvic tenderness, non-distended, bowel sounds present. Musculoskeletal: No clubbing, cyanosis or edema bilaterally. Skin: Skin color, texture, turgor normal.  No rashes or lesions. Neurologic:  Cranial nerves: II-XII intact, NYA, No focal sensory/motor deficits  Psychiatric: Alert and oriented, thought content appropriate  Capillary Refill: Brisk,< 3 seconds   Peripheral Pulses: +2 palpable, equal bilaterally       Intake/Output Summary (Last 24 hours) at 7/2/2020 1200  Last data filed at 7/1/2020 2052  Gross per 24 hour   Intake 310 ml   Output --   Net 310 ml       Labs:   Recent Labs     07/02/20  0453   WBC 12.7*   HGB 11.5*   HCT 34.7*         Recent Labs     06/30/20  0927 07/01/20  0358 07/02/20  0453   NA  --   --  138   K  --   --  3.7   CL  --   --  104   CO2  --   --  25   BUN  --   --  17   CREATININE  --   --  0.8   CALCIUM  --   --  9.1   * 466* 357*   * 463* 417*   BILIDIR 0.8* 0.6* 0.5*   BILITOT 1.6* 1.3* 1.0   ALKPHOS 254* 260* 279*     No results for input(s): Amaryllis Goodell in the last 72 hours.     Urinalysis:    Lab Results   Component Value Date    NITRU Negative 06/26/2020    WBCUA 9 06/26/2020    RBCUA 2 06/26/2020    BLOODU LARGE 06/26/2020    SPECGRAV 1.022 06/26/2020    GLUCOSEU Negative 06/26/2020       Radiology:  US BIOPSY LIVER PERCUTANEOUS   Final Result   Successful ultrasound-guided, random biopsies of the right lobe of the liver. US PELVIS COMPLETE   Final Result   Probable supracervical hysterectomy. Nonvisualization of right ovary. Slightly complex cystic structure within the left adnexa which may be ovarian   or paraovarian in nature. This has a appearance of a simple cyst with simple   septations on transabdominal imaging. This appears to correlate with   findings on CT from 06/26/2020. Follow-up at 1 year would be recommended in the absence of additional   clinical concern, risk factors. Free fluid within the pelvis which is nonspecific. Follow-up can be obtained   with CT abdomen and pelvis with contrast as clinically indicated. US NON OB TRANSVAGINAL   Final Result   Probable supracervical hysterectomy. Nonvisualization of right ovary. Slightly complex cystic structure within the left adnexa which may be ovarian   or paraovarian in nature. This has a appearance of a simple cyst with simple   septations on transabdominal imaging. This appears to correlate with   findings on CT from 06/26/2020. Follow-up at 1 year would be recommended in the absence of additional   clinical concern, risk factors. Free fluid within the pelvis which is nonspecific. Follow-up can be obtained   with CT abdomen and pelvis with contrast as clinically indicated. NM MYOCARDIAL SPECT REST EXERCISE OR RX   Final Result      US ABDOMEN LIMITED Specify organ? LIVER, PANCREAS, GALLBLADDER   Final Result   No cholelithiasis or ancillary evidence of acute cholecystitis. Heterogeneous hepatic echotexture, which can be in keeping with fatty liver   or underlying hepatocellular disease. CT ABDOMEN PELVIS WO CONTRAST Additional Contrast? None   Final Result   No acute abnormality. The liver appears cirrhotic and possibly fibrotic. This can be further evaluated with nonemergent MRI. XR CHEST PORTABLE   Final Result   Hyperplasia lungs. Bibasilar increased density could be related to bronchovascular padding. Pneumonia could be considered, and particularly given asymmetric right mid   lung increased opacity. Lilibeth Cartagena MD      Please excuse brevity and/or typos. This report was transcribed using voice recognition software. Every effort was made to ensure accuracy, however, inadvertent computerized transcription errors may be present.

## 2020-07-03 VITALS
RESPIRATION RATE: 18 BRPM | OXYGEN SATURATION: 94 % | WEIGHT: 155.7 LBS | BODY MASS INDEX: 25.02 KG/M2 | TEMPERATURE: 98.8 F | HEIGHT: 66 IN | SYSTOLIC BLOOD PRESSURE: 125 MMHG | HEART RATE: 58 BPM | DIASTOLIC BLOOD PRESSURE: 59 MMHG

## 2020-07-03 LAB
ALBUMIN SERPL-MCNC: 3.1 G/DL (ref 3.4–5)
ALP BLD-CCNC: 289 U/L (ref 40–129)
ALT SERPL-CCNC: 413 U/L (ref 10–40)
AST SERPL-CCNC: 296 U/L (ref 15–37)
BILIRUB SERPL-MCNC: 1 MG/DL (ref 0–1)
BILIRUBIN DIRECT: 0.4 MG/DL (ref 0–0.3)
BILIRUBIN, INDIRECT: 0.6 MG/DL (ref 0–1)
INR BLD: 1.02 (ref 0.86–1.14)
PROTHROMBIN TIME: 11.8 SEC (ref 10–13.2)
TOTAL PROTEIN: 6.8 G/DL (ref 6.4–8.2)

## 2020-07-03 PROCEDURE — 6370000000 HC RX 637 (ALT 250 FOR IP): Performed by: INTERNAL MEDICINE

## 2020-07-03 PROCEDURE — 36415 COLL VENOUS BLD VENIPUNCTURE: CPT

## 2020-07-03 PROCEDURE — 94640 AIRWAY INHALATION TREATMENT: CPT

## 2020-07-03 PROCEDURE — 6360000002 HC RX W HCPCS: Performed by: PHYSICIAN ASSISTANT

## 2020-07-03 PROCEDURE — 6360000002 HC RX W HCPCS: Performed by: INTERNAL MEDICINE

## 2020-07-03 PROCEDURE — 85610 PROTHROMBIN TIME: CPT

## 2020-07-03 PROCEDURE — 80076 HEPATIC FUNCTION PANEL: CPT

## 2020-07-03 PROCEDURE — 94760 N-INVAS EAR/PLS OXIMETRY 1: CPT

## 2020-07-03 RX ORDER — PANTOPRAZOLE SODIUM 40 MG/1
40 TABLET, DELAYED RELEASE ORAL
Qty: 30 TABLET | Refills: 3 | Status: ON HOLD | OUTPATIENT
Start: 2020-07-03 | End: 2021-11-03

## 2020-07-03 RX ORDER — LANOLIN ALCOHOL/MO/W.PET/CERES
100 CREAM (GRAM) TOPICAL DAILY
Qty: 30 TABLET | Refills: 3 | Status: SHIPPED | OUTPATIENT
Start: 2020-07-04

## 2020-07-03 RX ORDER — MULTIVITAMIN WITH IRON
1 TABLET ORAL DAILY
Qty: 30 TABLET | Refills: 0 | Status: SHIPPED | OUTPATIENT
Start: 2020-07-04 | End: 2021-03-17

## 2020-07-03 RX ORDER — PREDNISONE 20 MG/1
40 TABLET ORAL DAILY
Qty: 60 TABLET | Refills: 0 | Status: SHIPPED | OUTPATIENT
Start: 2020-07-03 | End: 2020-08-02

## 2020-07-03 RX ORDER — HYDROCODONE BITARTRATE AND ACETAMINOPHEN 5; 325 MG/1; MG/1
1 TABLET ORAL EVERY 6 HOURS PRN
Qty: 15 TABLET | Refills: 0 | Status: SHIPPED | OUTPATIENT
Start: 2020-07-03 | End: 2020-07-06

## 2020-07-03 RX ORDER — FOLIC ACID 1 MG/1
1 TABLET ORAL DAILY
Qty: 30 TABLET | Refills: 3 | Status: SHIPPED | OUTPATIENT
Start: 2020-07-04

## 2020-07-03 RX ADMIN — GABAPENTIN 300 MG: 300 CAPSULE ORAL at 08:15

## 2020-07-03 RX ADMIN — THERA TABS 1 TABLET: TAB at 08:15

## 2020-07-03 RX ADMIN — METHYLPREDNISOLONE SODIUM SUCCINATE 20 MG: 40 INJECTION, POWDER, FOR SOLUTION INTRAMUSCULAR; INTRAVENOUS at 04:17

## 2020-07-03 RX ADMIN — TIOTROPIUM BROMIDE INHALATION SPRAY 2 PUFF: 3.12 SPRAY, METERED RESPIRATORY (INHALATION) at 08:25

## 2020-07-03 RX ADMIN — LOSARTAN POTASSIUM 100 MG: 100 TABLET, FILM COATED ORAL at 09:39

## 2020-07-03 RX ADMIN — FOLIC ACID 1 MG: 1 TABLET ORAL at 08:15

## 2020-07-03 RX ADMIN — ATORVASTATIN CALCIUM 80 MG: 80 TABLET, FILM COATED ORAL at 08:15

## 2020-07-03 RX ADMIN — HYDROCHLOROTHIAZIDE 25 MG: 25 TABLET ORAL at 09:38

## 2020-07-03 RX ADMIN — Medication 100 MG: at 08:15

## 2020-07-03 RX ADMIN — ENOXAPARIN SODIUM 40 MG: 40 INJECTION SUBCUTANEOUS at 08:14

## 2020-07-03 RX ADMIN — GLYCOPYRROLATE AND FORMOTEROL FUMARATE 2 PUFF: 9; 4.8 AEROSOL, METERED RESPIRATORY (INHALATION) at 08:24

## 2020-07-03 RX ADMIN — PANTOPRAZOLE SODIUM 40 MG: 40 TABLET, DELAYED RELEASE ORAL at 07:07

## 2020-07-03 NOTE — PROGRESS NOTES
Discharge papers gone over and copies given. All questions and concerns addressed. Script x 1 given to pt . IV removed with no complications. Pt dressed and ready to go, waiting on ride. Will continue to monitor.

## 2020-07-03 NOTE — PLAN OF CARE
Problem: Infection:  Goal: Will remain free from infection  Description: Will remain free from infection  Outcome: Ongoing     Problem: Pain:  Goal: Patient's pain/discomfort is manageable  Description: Patient's pain/discomfort is manageable  Outcome: Ongoing  Goal: Pain level will decrease  Description: Pain level will decrease  Outcome: Ongoing  Goal: Control of acute pain  Description: Control of acute pain  Outcome: Ongoing  Goal: Control of chronic pain  Description: Control of chronic pain  Outcome: Ongoing     Problem: Falls - Risk of:  Goal: Will remain free from falls  Description: Will remain free from falls  Outcome: Ongoing  Goal: Absence of physical injury  Description: Absence of physical injury  Outcome: Ongoing     Problem: Coping:  Goal: Ability to verbalize feelings will improve  Description: Ability to verbalize feelings will improve  Outcome: Ongoing     Problem: Fluid Volume:  Goal: Will maintain adequate fluid volume  Description: Will maintain adequate fluid volume  Outcome: Ongoing     Problem: Nutritional:  Goal: Ability to achieve adequate nutritional intake will improve  Description: Ability to achieve adequate nutritional intake will improve  Outcome: Ongoing     The care plan and education has been reviewed and mutually agreed upon with the patient.

## 2020-07-03 NOTE — PROGRESS NOTES
Report called to [facility] on 4 tower, patient being transferred to [id number] [facility], all questions answered.

## 2020-07-03 NOTE — PROGRESS NOTES
Patient transferred from ICU to room 4460, oriented to room and call light system. Patient in bed, no s/s of distress, respirations even and unlabored, VSS, denies any pain. The care plan and education has been reviewed and mutually agreed upon with the patient. All needs attended. Call light within reach. Will continue to monitor.

## 2020-07-03 NOTE — PLAN OF CARE
Problem: Infection:  Goal: Will remain free from infection  Description: Will remain free from infection  7/3/2020 0842 by Gloris Boeck, RN  Outcome: Ongoing  7/3/2020 0152 by Td Matamoros RN  Outcome: Ongoing     Problem: Safety:  Goal: Free from accidental physical injury  Description: Free from accidental physical injury  Outcome: Ongoing  Goal: Free from intentional harm  Description: Free from intentional harm  Outcome: Ongoing     Problem: Daily Care:  Goal: Daily care needs are met  Description: Daily care needs are met  Outcome: Ongoing     Problem: Pain:  Goal: Patient's pain/discomfort is manageable  Description: Patient's pain/discomfort is manageable  7/3/2020 0842 by Gloris Boeck, RN  Outcome: Ongoing  7/3/2020 0152 by Td Matamoros RN  Outcome: Ongoing  Goal: Pain level will decrease  Description: Pain level will decrease  7/3/2020 0842 by Gloris Boeck, RN  Outcome: Ongoing  7/3/2020 0152 by Td Matamoros RN  Outcome: Ongoing  Goal: Control of acute pain  Description: Control of acute pain  7/3/2020 0842 by Gloris Boeck, RN  Outcome: Ongoing  7/3/2020 0152 by Td Matamoros RN  Outcome: Ongoing  Goal: Control of chronic pain  Description: Control of chronic pain  7/3/2020 0842 by Gloris Boeck, RN  Outcome: Ongoing  7/3/2020 0152 by Td Matamoros RN  Outcome: Ongoing     Problem: Skin Integrity:  Goal: Skin integrity will stabilize  Description: Skin integrity will stabilize  Outcome: Ongoing     Problem: Discharge Planning:  Goal: Patients continuum of care needs are met  Description: Patients continuum of care needs are met  Outcome: Ongoing     Problem: Falls - Risk of:  Goal: Will remain free from falls  Description: Will remain free from falls  7/3/2020 0842 by Gloris Boeck, RN  Outcome: Ongoing  7/3/2020 0152 by Td Matamoros RN  Outcome: Ongoing  Goal: Absence of physical injury  Description: Absence of physical injury  7/3/2020 0842 by Gloris Boeck, RN  Outcome: Ongoing  7/3/2020 0152 by Claire Sutherland RN  Outcome: Ongoing     Problem: Skin Integrity:  Goal: Will show no infection signs and symptoms  Description: Will show no infection signs and symptoms  Outcome: Ongoing  Goal: Absence of new skin breakdown  Description: Absence of new skin breakdown  Outcome: Ongoing     Problem: Coping:  Goal: Ability to verbalize feelings will improve  Description: Ability to verbalize feelings will improve  7/3/2020 0842 by Alana Corona RN  Outcome: Ongoing  7/3/2020 0152 by Claire Sutherland RN  Outcome: Ongoing     Problem: Fluid Volume:  Goal: Will maintain adequate fluid volume  Description: Will maintain adequate fluid volume  7/3/2020 0842 by Alana Corona RN  Outcome: Ongoing  7/3/2020 0152 by Claire Sutherland RN  Outcome: Ongoing     Problem: Health Behavior:  Goal: Ability to identify changes in lifestyle to reduce recurrence of condition will improve  Description: Ability to identify changes in lifestyle to reduce recurrence of condition will improve  Outcome: Ongoing     Problem: Nutritional:  Goal: Ability to achieve adequate nutritional intake will improve  Description: Ability to achieve adequate nutritional intake will improve  7/3/2020 0842 by Alana Corona RN  Outcome: Ongoing  7/3/2020 0152 by Claire Sutherland RN  Outcome: Ongoing     Problem: Physical Regulation:  Goal: Complications related to the disease process, condition or treatment will be avoided or minimized  Description: Complications related to the disease process, condition or treatment will be avoided or minimized  Outcome: Ongoing     Problem: Respiratory:  Goal: Ability to achieve and maintain a regular respiratory rate will improve  Description: Ability to achieve and maintain a regular respiratory rate will improve  Outcome: Ongoing     Problem: Sensory:  Goal: General experience of comfort will improve  Description: General experience of comfort will improve  Outcome: Ongoing

## 2020-07-03 NOTE — DISCHARGE SUMMARY
Hospital Medicine Discharge Summary    Patient ID: Yasemin Cotton      Patient's PCP: Dillon Lin MD    Admit Date: 6/25/2020     Discharge Date: 7/3/2020      Admitting Physician: Cathy Carr MD     Discharge Physician: Otto Nix MD     Discharge Diagnoses: Active Hospital Problems    Diagnosis    Autoimmune hepatitis ([facility] [K75.4]    DIAZ positive [R76.8]    Troponin level elevated [R79.89]    Abdominal pain [R10.9]    Transaminitis [R74.0]    CAD (coronary artery disease) [I25.10]    COPD (chronic obstructive pulmonary disease) (HCC) [J44.9]    HTN (hypertension) [I10]    Cirrhosis ([facility] [K74.60]    Alcohol abuse [F10.10]    COVID-19 virus not detected [Z03.818]    Abnormal EKG [R94.31]       The patient was seen and examined on day of discharge and this discharge summary is in conjunction with any daily progress note from day of discharge. Hospital Course: The patient is a 54-year-old lady with history of hypertension, alcoholic pancreatitis, coronary artery disease status post stents, COPD, history of alcohol abuse, who presented with abdominal pain/pelvic pain and found to have elevated troponins to rule out ACS. She was also found to have transaminitis of unclear etiology. Laboratory work-up noted for positive DIAZ  COVID-19 negative x2 one on 6/26/2020 and repeated on 6/28/2020     Probable autoimmune hepatitis with transaminitis and DIAZ positive: elevated DIAZ. She was consulted by GI. LFTs continue to trend down on steroids. She underwent liver biopsy on 7/2/2020 and results pending at time of discharge to follow-up with GI in the outpatient. We will continue on oral prednisone 40 mg daily for the next 4 weeks and further adjustment to be made by GI thereafter.       Chronic left pelvic pain:  abdominal CT on presentation did not reveal any pelvic pathology. Pelvic and transvaginal ultrasound unrevealing.   Seen by gynecology with no clear within the pelvis which is nonspecific. Follow-up can be obtained   with CT abdomen and pelvis with contrast as clinically indicated. NM MYOCARDIAL SPECT REST EXERCISE OR RX   Final Result      US ABDOMEN LIMITED Specify organ? LIVER, PANCREAS, GALLBLADDER   Final Result   No cholelithiasis or ancillary evidence of acute cholecystitis. Heterogeneous hepatic echotexture, which can be in keeping with fatty liver   or underlying hepatocellular disease. CT ABDOMEN PELVIS WO CONTRAST Additional Contrast? None   Final Result   No acute abnormality. The liver appears cirrhotic and possibly fibrotic. This can be further evaluated with nonemergent MRI. XR CHEST PORTABLE   Final Result   Hyperplasia lungs. Bibasilar increased density could be related to bronchovascular padding. Pneumonia could be considered, and particularly given asymmetric right mid   lung increased opacity. Consults:     IP CONSULT TO HOSPITALIST  IP CONSULT TO CARDIOLOGY  IP CONSULT TO GI  IP CONSULT TO SPIRITUAL SERVICES  IP CONSULT TO OB GYN    Disposition: Home    Condition at Discharge: Stable    Discharge Instructions/Follow-up:    PCP follow-up in 1 to 2 weeks  GI clinic follow-up in 1 to 2 weeks    Code Status:  Full Code     Activity: activity as tolerated    Diet: regular diet      Discharge Medications:     Discharge Medication List as of 7/3/2020 10:24 AM           Details   HYDROcodone-acetaminophen (NORCO) 5-325 MG per tablet Take 1 tablet by mouth every 6 hours as needed for Pain for up to 3 days. , Disp-15 tablet, [id number]      folic acid (FOLVITE) 1 MG tablet Take 1 tablet by mouth daily, Disp-30 tablet, R-3Normal      Multiple Vitamin (MULTIVITAMIN) TABS tablet Take 1 tablet by mouth daily, Disp-30 tablet, R-0Normal      pantoprazole (PROTONIX) 40 MG tablet Take 1 tablet by mouth 2 times daily (before meals), Disp-30 tablet, R-3Normal      vitamin B-1 100 MG tablet Take 1 tablet by

## 2020-07-20 ENCOUNTER — HOSPITAL ENCOUNTER (OUTPATIENT)
Age: 65
Discharge: HOME | End: 2020-07-20
Payer: MEDICARE

## 2020-07-20 LAB
ALBUMIN SERPL-MCNC: 3.6 G/DL (ref 3.4–5)
ALP BLD-CCNC: 182 U/L (ref 40–129)
ALT SERPL-CCNC: 82 U/L (ref 10–40)
ANION GAP SERPL CALCULATED.3IONS-SCNC: 9 MMOL/L (ref 3–16)
AST SERPL-CCNC: 36 U/L (ref 15–37)
BILIRUB SERPL-MCNC: 0.8 MG/DL (ref 0–1)
BILIRUBIN DIRECT: 0.3 MG/DL (ref 0–0.3)
BILIRUBIN, INDIRECT: 0.5 MG/DL (ref 0–1)
BUN BLDV-MCNC: 18 MG/DL (ref 7–20)
CALCIUM SERPL-MCNC: 9.3 MG/DL (ref 8.3–10.6)
CHLORIDE BLD-SCNC: 104 MMOL/L (ref 99–110)
CO2: 28 MMOL/L (ref 21–32)
CREAT SERPL-MCNC: 0.6 MG/DL (ref 0.6–1.2)
GFR AFRICAN AMERICAN: >60
GFR NON-AFRICAN AMERICAN: >60
GLUCOSE BLD-MCNC: 101 MG/DL (ref 70–99)
INR BLD: 1.01 (ref 0.86–1.14)
POTASSIUM SERPL-SCNC: 4.8 MMOL/L (ref 3.5–5.1)
PROTHROMBIN TIME: 11.7 SEC (ref 10–13.2)
SODIUM BLD-SCNC: 141 MMOL/L (ref 136–145)
TOTAL PROTEIN: 6.6 G/DL (ref 6.4–8.2)

## 2020-07-20 PROCEDURE — 85610 PROTHROMBIN TIME: CPT

## 2020-07-20 PROCEDURE — 36415 COLL VENOUS BLD VENIPUNCTURE: CPT

## 2020-07-20 PROCEDURE — 80048 BASIC METABOLIC PNL TOTAL CA: CPT

## 2020-07-20 PROCEDURE — 82657 ENZYME CELL ACTIVITY: CPT

## 2020-07-20 PROCEDURE — 80076 HEPATIC FUNCTION PANEL: CPT

## 2020-07-23 NOTE — PROGRESS NOTES
[address]     Outpatient Follow Up Note    CHIEF COMPLAINT / HPI: Hospital Follow Up secondary to coronary artery disease    Hospital record has been reviewed  Hospital Course progressed as follows per discharge summary:     6/25/20-7/3/20  Hospital Course  The patient is a 58-year-old lady with history of hypertension, alcoholic pancreatitis, coronary artery disease status post stents, COPD, history of alcohol abuse, who presented with abdominal pain/pelvic pain and found to have elevated troponins to rule out ACS.  She was also found to have transaminitis of unclear etiology.  Laboratory work-up noted for positive DIAZ  COVID-19 negative x2 one on 6/26/2020 and repeated on 6/28/2020      Probable autoimmune hepatitis with transaminitis and DIAZ positive: elevated DIAZ. She was consulted by GI. LFTs continue to trend down on steroids. She underwent liver biopsy on 7/2/2020 and results pending at time of discharge to follow-up with GI in the outpatient. We will continue on oral prednisone 40 mg daily for the next 4 weeks and further adjustment to be made by GI thereafter.       Chronic left pelvic pain:  abdominal CT on presentation did not reveal any pelvic pathology.  Pelvic and transvaginal ultrasound unrevealing.  Seen by gynecology with no clear etiology. Pain improved with analgesia. Gil Rey is 76 y.o. female who presents today for a routine follow up after a recent hospitalization related to the above mentioned issues. Subjective:     Since leaving the hospital, Ms. Nic Marinelli states she has been feeling good. Her only complaint is feeling shaky from the prednisone. She is hopeful it will be titrated down soon by GI pending the lab work she recently had done. Ms. Nic Marinelli denies chest pain, palpitations, dizziness, or swelling. She says she is short of breath frequently due to her history of emphysema. 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  She REVIEW OF SYSTEMS:   CONSTITUTIONAL: No major weight gain or loss, fatigue, weakness, night sweats or fever. There's been no change in energy level, sleep pattern, or activity level. HEENT: No new vision difficulties or ringing in the ears. RESPIRATORY: No new SOB, PND, orthopnea or cough. CARDIOVASCULAR: See HPI  GI: No nausea, vomiting, diarrhea, constipation, abdominal pain or changes in bowel habits. : No urinary frequency, urgency, incontinence hematuria or dysuria. SKIN: No cyanosis or skin lesions. MUSCULOSKELETAL: No new muscle or joint pain. NEUROLOGICAL: No syncope or TIA-like symptoms. PSYCHIATRIC: No anxiety, pain, insomnia or depression    Objective:   PHYSICAL EXAM:        VITALS:  /70   Pulse 65   Ht 5' 6\" (1.676 m)   Wt 165 lb (74.8 kg)   SpO2 99%   BMI 26.63 kg/m²     CONSTITUTIONAL: Cooperative, no apparent distress, and appears well nourished / developed  NEUROLOGIC:  Awake and orientated to person, place and time. PSYCH: Calm affect. SKIN: Warm and dry. HEENT: Sclera non-icteric, normocephalic, neck supple, no elevation of JVP, normal carotid pulses with no bruits and thyroid normal size. LUNGS:  No increased work of breathing and clear to auscultation, no crackles or wheezing. CARDIOVASCULAR:  Regular rate and rhythm with no murmurs, gallops, rubs, or abnormal heart sounds, normal PMI. The apical impulses not displaced. Heart tones are crisp and normal                                                                                          Cervical veins are not engorged                 JVP less than 8 cm H2O                                                                              The carotid upstroke is normal in amplitude and contour without delay or bruit    ABDOMEN:  Normal bowel sounds, non-distended and non-tender to palpation   EXT: No edema, no calf tenderness. Pulses are present bilaterally.     DATA:    Lab Results that has luminal   irregularities.      Impression:  Successful PCI to the OM1 99% subtotal occlusion with   drug-eluting senna G2 0.25 x 28 mm stent   Mild disease in LAD   Severe disease in the RCA   Preserved LV Function     Plan:     Medical treatment of CAD.  Further management of the RCA   depending on clinical course.  Aspirin for life. Plavix for > 1   year ( Patient has been advised strongly for compliance with the   dual antiplatelet therapy. I have personally prescribed the   Plavix script with adequate number of refills). Last ECG 6/22/20:  Sinus rhythm  Borderline short PA interval  Left axis deviation    Assessment:      Diagnosis Orders   1. Elevated troponin   ~GXT 6/26/20: normal perfusion   ~Likely demand ischemia from acute medical illness    2. Coronary artery disease involving native coronary artery of native heart without angina pectoris   ~s/p PCI OM1 (2016)  ~GXT 6/26/20: normal perfusion  ~on ASA.BB.Statin  ~Stable, no complaints of angina    3. Autoimmune hepatitis ([address]   ~enzymes trending down on oral steroids  ~s/p liver biopsy   ~following with GI OP  ~Plan for EGD for variceal screening     4. Essential hypertension   ~BP today 130/70  ~Controlled on Lopressor, HCTZ, Cozaar        Patient  is stable since hospital discharge. Plan:   1. No medication changes at this time  2. Follow up in 3 months, will establish with cardiologist then (Dr. Merle Capps). I have addressed the patient's cardiac risk factors and adjusted pharmacologic treatment as needed. In addition, I have reinforced the need for patient directed risk factor modification. Further evaluation will be based upon the patient's clinical course and testing results. All questions and concerns were addressed to the patient. Alternatives to  treatment were discussed. The patient  currently  is not smoking. The risks related to smoking were reviewed with the patient. Recommend maintaining a smoke-free lifestyle. Products available for smoking cessation were discussed. Dual Antiplatelet therapy / anti-coagulation has not been recommended / prescribed for this patient. Education conducted on adverse reactions including bleeding was discussed. The patient verbalizes understanding. Pt is on a BB  Pt is on an ace-i/ARB  Pt is on a statin      Saturated fat diet discussed  Exercise program discussed    Thank you for allowing to us to participate in the care of Shoshana De Paz.     [address]  Documentation of today's visit sent to PCP

## 2020-07-24 ENCOUNTER — OFFICE VISIT (OUTPATIENT)
Dept: CARDIOLOGY CLINIC | Age: 65
End: 2020-07-24
Payer: MEDICARE

## 2020-07-24 VITALS
HEART RATE: 65 BPM | BODY MASS INDEX: 26.52 KG/M2 | HEIGHT: 66 IN | DIASTOLIC BLOOD PRESSURE: 70 MMHG | SYSTOLIC BLOOD PRESSURE: 130 MMHG | WEIGHT: 165 LBS | OXYGEN SATURATION: 99 %

## 2020-07-24 PROCEDURE — 99214 OFFICE O/P EST MOD 30 MIN: CPT | Performed by: NURSE PRACTITIONER

## 2020-07-27 ENCOUNTER — OFFICE VISIT (OUTPATIENT)
Dept: PRIMARY CARE CLINIC | Age: 65
End: 2020-07-27
Payer: MEDICARE

## 2020-07-27 PROCEDURE — 99211 OFF/OP EST MAY X REQ PHY/QHP: CPT | Performed by: NURSE PRACTITIONER

## 2020-07-27 NOTE — PROGRESS NOTES
Micha Puentes received a viral test for COVID-19. They were educated on isolation and quarantine as appropriate. For any symptoms, they were directed to seek care from their PCP, given contact information to establish with a doctor, directed to an urgent care or the emergency room. Patient was seen today for pre op Covid testing.

## 2020-07-27 NOTE — PATIENT INSTRUCTIONS

## 2020-07-28 LAB
MISCELLANEOUS LAB TEST ORDER: NORMAL
REPORT: NORMAL
SARS-COV-2: NOT DETECTED
THIS TEST SENT TO: NORMAL

## 2020-08-20 ENCOUNTER — HOSPITAL ENCOUNTER (OUTPATIENT)
Age: 65
Discharge: HOME | End: 2020-08-20
Payer: MEDICARE

## 2020-08-20 LAB
ALBUMIN SERPL-MCNC: 3.7 G/DL (ref 3.4–5)
ALP BLD-CCNC: 135 U/L (ref 40–129)
ALT SERPL-CCNC: 75 U/L (ref 10–40)
ANION GAP SERPL CALCULATED.3IONS-SCNC: 10 MMOL/L (ref 3–16)
AST SERPL-CCNC: 54 U/L (ref 15–37)
BILIRUB SERPL-MCNC: 0.8 MG/DL (ref 0–1)
BILIRUBIN DIRECT: 0.3 MG/DL (ref 0–0.3)
BILIRUBIN, INDIRECT: 0.5 MG/DL (ref 0–1)
BUN BLDV-MCNC: 30 MG/DL (ref 7–20)
CALCIUM SERPL-MCNC: 10 MG/DL (ref 8.3–10.6)
CHLORIDE BLD-SCNC: 100 MMOL/L (ref 99–110)
CO2: 28 MMOL/L (ref 21–32)
CREAT SERPL-MCNC: 1 MG/DL (ref 0.6–1.2)
GFR AFRICAN AMERICAN: >60
GFR NON-AFRICAN AMERICAN: 55
GLUCOSE BLD-MCNC: 138 MG/DL (ref 70–99)
INR BLD: 1 (ref 0.86–1.14)
POTASSIUM SERPL-SCNC: 4.5 MMOL/L (ref 3.5–5.1)
PROTHROMBIN TIME: 11.6 SEC (ref 10–13.2)
SODIUM BLD-SCNC: 138 MMOL/L (ref 136–145)
TOTAL PROTEIN: 7.5 G/DL (ref 6.4–8.2)

## 2020-08-20 PROCEDURE — 80076 HEPATIC FUNCTION PANEL: CPT

## 2020-08-20 PROCEDURE — 80048 BASIC METABOLIC PNL TOTAL CA: CPT

## 2020-08-20 PROCEDURE — 36415 COLL VENOUS BLD VENIPUNCTURE: CPT

## 2020-08-20 PROCEDURE — 85610 PROTHROMBIN TIME: CPT

## 2020-08-23 PROBLEM — R79.89 ELEVATED TROPONIN: Status: RESOLVED | Noted: 2020-06-26 | Resolved: 2020-08-23

## 2020-08-23 PROBLEM — R77.8 ELEVATED TROPONIN: Status: RESOLVED | Noted: 2020-06-26 | Resolved: 2020-08-23

## 2020-08-31 ENCOUNTER — HOSPITAL ENCOUNTER (OUTPATIENT)
Age: 65
Discharge: HOME | End: 2020-08-31
Payer: MEDICARE

## 2020-08-31 LAB
ALBUMIN SERPL-MCNC: 3.6 G/DL (ref 3.4–5)
ALP BLD-CCNC: 107 U/L (ref 40–129)
ALT SERPL-CCNC: 73 U/L (ref 10–40)
ANION GAP SERPL CALCULATED.3IONS-SCNC: 13 MMOL/L (ref 3–16)
AST SERPL-CCNC: 93 U/L (ref 15–37)
BILIRUB SERPL-MCNC: 1.5 MG/DL (ref 0–1)
BILIRUBIN DIRECT: 0.4 MG/DL (ref 0–0.3)
BILIRUBIN, INDIRECT: 1.1 MG/DL (ref 0–1)
BUN BLDV-MCNC: 12 MG/DL (ref 7–20)
CALCIUM SERPL-MCNC: 9.5 MG/DL (ref 8.3–10.6)
CHLORIDE BLD-SCNC: 100 MMOL/L (ref 99–110)
CO2: 27 MMOL/L (ref 21–32)
CREAT SERPL-MCNC: 0.8 MG/DL (ref 0.6–1.2)
GFR AFRICAN AMERICAN: >60
GFR NON-AFRICAN AMERICAN: >60
GLUCOSE BLD-MCNC: 106 MG/DL (ref 70–99)
INR BLD: 1.07 (ref 0.86–1.14)
POTASSIUM SERPL-SCNC: 3.3 MMOL/L (ref 3.5–5.1)
PROTHROMBIN TIME: 12.4 SEC (ref 10–13.2)
SODIUM BLD-SCNC: 140 MMOL/L (ref 136–145)
TOTAL PROTEIN: 7.1 G/DL (ref 6.4–8.2)

## 2020-08-31 PROCEDURE — 36415 COLL VENOUS BLD VENIPUNCTURE: CPT

## 2020-08-31 PROCEDURE — 85610 PROTHROMBIN TIME: CPT

## 2020-08-31 PROCEDURE — 80076 HEPATIC FUNCTION PANEL: CPT

## 2020-08-31 PROCEDURE — 80048 BASIC METABOLIC PNL TOTAL CA: CPT

## 2020-09-11 ENCOUNTER — HOSPITAL ENCOUNTER (OUTPATIENT)
Age: 65
Discharge: HOME | End: 2020-09-11
Payer: MEDICARE

## 2020-09-11 LAB
ALBUMIN SERPL-MCNC: 3.8 G/DL (ref 3.4–5)
ALP BLD-CCNC: 106 U/L (ref 40–129)
ALT SERPL-CCNC: 71 U/L (ref 10–40)
ANION GAP SERPL CALCULATED.3IONS-SCNC: 11 MMOL/L (ref 3–16)
AST SERPL-CCNC: 58 U/L (ref 15–37)
BILIRUB SERPL-MCNC: 0.9 MG/DL (ref 0–1)
BILIRUBIN DIRECT: <0.2 MG/DL (ref 0–0.3)
BILIRUBIN, INDIRECT: ABNORMAL MG/DL (ref 0–1)
BUN BLDV-MCNC: 21 MG/DL (ref 7–20)
CALCIUM SERPL-MCNC: 9.7 MG/DL (ref 8.3–10.6)
CHLORIDE BLD-SCNC: 100 MMOL/L (ref 99–110)
CO2: 27 MMOL/L (ref 21–32)
CREAT SERPL-MCNC: 0.7 MG/DL (ref 0.6–1.2)
GFR AFRICAN AMERICAN: >60
GFR NON-AFRICAN AMERICAN: >60
GLUCOSE BLD-MCNC: 135 MG/DL (ref 70–99)
INR BLD: 1.06 (ref 0.86–1.14)
POTASSIUM SERPL-SCNC: 4.6 MMOL/L (ref 3.5–5.1)
PROTHROMBIN TIME: 12.3 SEC (ref 10–13.2)
SODIUM BLD-SCNC: 138 MMOL/L (ref 136–145)
TOTAL PROTEIN: 7.1 G/DL (ref 6.4–8.2)

## 2020-09-11 PROCEDURE — 85610 PROTHROMBIN TIME: CPT

## 2020-09-11 PROCEDURE — 80076 HEPATIC FUNCTION PANEL: CPT

## 2020-09-11 PROCEDURE — 36415 COLL VENOUS BLD VENIPUNCTURE: CPT

## 2020-09-11 PROCEDURE — 80048 BASIC METABOLIC PNL TOTAL CA: CPT

## 2020-10-06 ENCOUNTER — HOSPITAL ENCOUNTER (OUTPATIENT)
Age: 65
Discharge: HOME | End: 2020-10-06
Payer: MEDICARE

## 2020-10-06 LAB
ALBUMIN SERPL-MCNC: 3.8 G/DL (ref 3.4–5)
ALP BLD-CCNC: 84 U/L (ref 40–129)
ALT SERPL-CCNC: 51 U/L (ref 10–40)
AST SERPL-CCNC: 33 U/L (ref 15–37)
BASOPHILS ABSOLUTE: 0 K/UL (ref 0–0.2)
BASOPHILS RELATIVE PERCENT: 0.3 %
BILIRUB SERPL-MCNC: 1.1 MG/DL (ref 0–1)
BILIRUBIN DIRECT: 0.3 MG/DL (ref 0–0.3)
BILIRUBIN, INDIRECT: 0.8 MG/DL (ref 0–1)
EOSINOPHILS ABSOLUTE: 0 K/UL (ref 0–0.6)
EOSINOPHILS RELATIVE PERCENT: 0.2 %
HCT VFR BLD CALC: 40.8 % (ref 36–48)
HEMOGLOBIN: 13.9 G/DL (ref 12–16)
LYMPHOCYTES ABSOLUTE: 2.2 K/UL (ref 1–5.1)
LYMPHOCYTES RELATIVE PERCENT: 27 %
MCH RBC QN AUTO: 32.7 PG (ref 26–34)
MCHC RBC AUTO-ENTMCNC: 34.1 G/DL (ref 31–36)
MCV RBC AUTO: 96 FL (ref 80–100)
MONOCYTES ABSOLUTE: 0.6 K/UL (ref 0–1.3)
MONOCYTES RELATIVE PERCENT: 7.7 %
NEUTROPHILS ABSOLUTE: 5.2 K/UL (ref 1.7–7.7)
NEUTROPHILS RELATIVE PERCENT: 64.8 %
PDW BLD-RTO: 14.3 % (ref 12.4–15.4)
PLATELET # BLD: 221 K/UL (ref 135–450)
PMV BLD AUTO: 7.4 FL (ref 5–10.5)
RBC # BLD: 4.24 M/UL (ref 4–5.2)
TOTAL PROTEIN: 6.8 G/DL (ref 6.4–8.2)
WBC # BLD: 8 K/UL (ref 4–11)

## 2020-10-06 PROCEDURE — 36415 COLL VENOUS BLD VENIPUNCTURE: CPT

## 2020-10-06 PROCEDURE — 80076 HEPATIC FUNCTION PANEL: CPT

## 2020-10-06 PROCEDURE — 85025 COMPLETE CBC W/AUTO DIFF WBC: CPT

## 2020-10-29 ENCOUNTER — OFFICE VISIT (OUTPATIENT)
Dept: CARDIOLOGY CLINIC | Age: 65
End: 2020-10-29
Payer: MEDICARE

## 2020-10-29 ENCOUNTER — TELEPHONE (OUTPATIENT)
Dept: CARDIOLOGY CLINIC | Age: 65
End: 2020-10-29

## 2020-10-29 VITALS
HEART RATE: 71 BPM | SYSTOLIC BLOOD PRESSURE: 112 MMHG | BODY MASS INDEX: 27.32 KG/M2 | DIASTOLIC BLOOD PRESSURE: 76 MMHG | HEIGHT: 66 IN | WEIGHT: 170 LBS | OXYGEN SATURATION: 99 %

## 2020-10-29 PROCEDURE — 99214 OFFICE O/P EST MOD 30 MIN: CPT | Performed by: NURSE PRACTITIONER

## 2020-10-29 RX ORDER — PREDNISONE 10 MG/1
TABLET ORAL
COMMUNITY
Start: 2020-10-10 | End: 2021-04-05 | Stop reason: ALTCHOICE

## 2020-10-29 NOTE — TELEPHONE ENCOUNTER
Patient had a virtual visit today and ANDRA     Requested a 6 month office visit with Ferdinand Hammond and I was unbale to leave a msg for her voicemail was full    Please advise

## 2020-10-29 NOTE — PROGRESS NOTES
[address]     Outpatient Follow Up Note    CHIEF COMPLAINT / HPI:  Follow Up secondary to coronary artery disease    Subjective:   Kamlesh Espinoza is 76 y.o. female who presents today with a history of hypertension, alcoholic pancreatitis, coronary artery disease s/p PCI to OM1 (2016), normal GXT in 6/2020. She is following with GI for autoimmune hepatitis with transaminitis. Today, Ms. Nahomi Patel states she is still on prednisone per GI and is anxious to get off. She was recently in the hospital because she thought her blood sugar was high and she complained of a fleeting moment of chest pain under her left breast while she was driving. Her troponin was negative and no further work up was done at that time. She has not experienced chest pain since. She states that prior to her stents in 2016 she felt like her heart was racing every time she stood up. She has not experienced that sensation since her PCI in 2016. Ms. Nahomi Patel denies shortness of breath, palpitations, dizziness, or edema.     Past Medical History:   Diagnosis Date    ETOH abuse     HTN (hypertension)     Pancreatitis     Shingles      Social History:    Social History     Tobacco Use   Smoking Status Never Smoker   Smokeless Tobacco Never Used     Current Medications:  Current Outpatient Medications   Medication Sig Dispense Refill    metoprolol tartrate (LOPRESSOR) 25 MG tablet Take 0.5 tablets by mouth 2 times daily 60 tablet 3    folic acid (FOLVITE) 1 MG tablet Take 1 tablet by mouth daily 30 tablet 3    Multiple Vitamin (MULTIVITAMIN) TABS tablet Take 1 tablet by mouth daily 30 tablet 0    pantoprazole (PROTONIX) 40 MG tablet Take 1 tablet by mouth 2 times daily (before meals) 30 tablet 3    vitamin B-1 100 MG tablet Take 1 tablet by mouth daily 30 tablet 3    aclidinium (TUDORZA PRESSAIR) 400 MCG/ACT AEPB inhaler Inhale 1 puff into the lungs 2 times daily      albuterol sulfate HFA (PROAIR HFA) 108 (90 Base) MCG/ACT inhaler Inhale 2 puffs into the lungs every 6 hours as needed for Wheezing      aspirin 81 MG chewable tablet Take 81 mg by mouth daily      atorvastatin (LIPITOR) 80 MG tablet Take 80 mg by mouth daily      gabapentin (NEURONTIN) 300 MG capsule Take 300 mg by mouth 3 times daily.  hydroCHLOROthiazide (HYDRODIURIL) 25 MG tablet Take 25 mg by mouth daily      ipratropium-albuterol (DUONEB) 0.5-2.5 (3) MG/3ML SOLN nebulizer solution Inhale 1 vial into the lungs every 4 hours      losartan (COZAAR) 100 MG tablet Take 100 mg by mouth daily      SUMAtriptan (IMITREX) 20 MG/ACT nasal spray 1 spray by Nasal route daily as needed for Migraine      umeclidinium-vilanterol (ANORO ELLIPTA) 62.5-25 MCG/INH AEPB inhaler Inhale 1 puff into the lungs daily       No current facility-administered medications for this visit. REVIEW OF SYSTEMS:    CONSTITUTIONAL: No major weight gain or loss, fatigue, weakness, night sweats or fever. HEENT: No new vision difficulties or ringing in the ears. RESPIRATORY: No new SOB, PND, orthopnea or cough. CARDIOVASCULAR: See HPI  GI: No nausea, vomiting, diarrhea, constipation, abdominal pain or changes in bowel habits. : No urinary frequency, urgency, incontinence hematuria or dysuria. SKIN: No cyanosis or skin lesions. MUSCULOSKELETAL: No new muscle or joint pain. NEUROLOGICAL: No syncope or TIA-like symptoms. PSYCHIATRIC: No anxiety, pain, insomnia or depression    Objective:   PHYSICAL EXAM:        VITALS:  /76 (Site: Left Upper Arm, Position: Sitting, Cuff Size: Large Adult)   Pulse 71   Ht 5' 6\" (1.676 m)   Wt 170 lb (77.1 kg)   SpO2 99%   BMI 27.44 kg/m²   CONSTITUTIONAL: Cooperative, no apparent distress, and appears well nourished / developed  NEUROLOGIC:  Awake and orientated to person, place and time. PSYCH: Calm affect. SKIN: Warm and dry.   HEENT: Sclera non-icteric, normocephalic, neck supple, no elevation of JVP, normal carotid pulses with no bruits and thyroid normal size. LUNGS:  No increased work of breathing and clear to auscultation, no crackles or wheezing  CARDIOVASCULAR:  Regular rate and rhythm with no murmurs, gallops, rubs, or abnormal heart sounds, normal PMI. ABDOMEN:  Normal bowel sounds, non-distended and non-tender to palpation  EXT: No edema, no calf tenderness. Pulses are present bilaterally. DATA:    Lab Results   Component Value Date    ALT 51 (H) 10/06/2020    AST 33 10/06/2020    ALKPHOS 84 10/06/2020    BILITOT 1.1 (H) 10/06/2020     Lab Results   Component Value Date    CREATININE 0.7 09/11/2020    BUN 21 (H) 09/11/2020     09/11/2020    K 4.6 09/11/2020     09/11/2020    CO2 27 09/11/2020     No results found for: TSH, I5XWDXH, Y3RINSG, THYROIDAB  Lab Results   Component Value Date    WBC 8.0 10/06/2020    HGB 13.9 10/06/2020    HCT 40.8 10/06/2020    MCV 96.0 10/06/2020     10/06/2020     No components found for: CHLPL  Lab Results   Component Value Date    TRIG 94 06/26/2020     No results found for: HDL  No results found for: LDLCALC  No results found for: LABVLDL     No results found for: BNP  Radiology Review:  Pertinent images / reports were reviewed as a part of this visit and reveals the following:    Last Echo 6/26/20:   Summary   -Normal left ventricle size, wall thickness, and systolic function with an   estimated ejection fraction of 70%.  -No regional wall motion abnormalities are seen.   -Normal diastolic function. Avg. E/e'=11.35     Last Stress Test 6/29/20:  Normal LV wall motion and function  Normal myocardial perfusion     Cardiac cath [facility] 9/22/2016:  Coronary angiography:   1. LM is of large caliber and gives rise to the LAD and left   circumflex coronary arteries.        It is free of significant disease  2.  LAD system is large caliber system and has diffuse mild   disease 20-30% without any significant obstructive lesion.  It   gives rise to 2 small caliber diagonal that has moderate to   severe ostial disease.    3. LCx system is large caliber and gives rise to medium caliber   OM1.  OM1 has 99% subtotal occlusion followed by sequential 85%   lesion with JANN 2 flow. 4. RCA system is large-caliber system and has mild diffuse   disease with focal lesion in the midsegment about 80% tubular   lesion in between mid and distal segment about 85%.  It   bifurcates.  Posterior lateral system that has luminal   irregularities.     Impression:  Successful PCI to the OM1 99% subtotal occlusion with   drug-eluting senna G2 0.25 x 28 mm stent   Mild disease in LAD   Severe disease in the RCA   Preserved LV Function     Plan:     Medical treatment of CAD.  Further management of the RCA   depending on clinical course.  Aspirin for life. Plavix for > 1   year ( Patient has been advised strongly for compliance with the   dual antiplatelet therapy. I have personally prescribed the   Plavix script with adequate number of refills).     Last ECG 6/22/20:  Sinus rhythm  Borderline short MD interval  Left axis deviation    Assessment:      Diagnosis Orders   1. Coronary artery disease involving native coronary artery of native heart without angina pectoris   ~s/p PCI OM1 (2016)  ~GXT 6/26/20: normal perfusion  ~on ASA.BB.Statin  ~Stable, no complaints of angina     2. Essential hypertension   ~/76 today in the office   ~[place], HCTZ, metoprolol     3. Mixed hyperlipidemia   ~Atorvastatin 80 mg     4. Autoimmune hepatitis [facility])   ~Following with GI      I had the opportunity to review the clinical symptoms and presentation of Francisco Ricci. Plan:     1. No medication changes at this time, medically stable. 2. RTO in 6 months with Dr. Karena Montiel     Overall the patient is stable from CV standpoint    I have addresed the patient's cardiac risk factors and adjusted pharmacologic treatment as needed. In addition, I have reinforced the need for patient directed risk factor modification.     Further evaluation will be based upon the patient's clinical course and testing results. All questions and concerns were addressed to the patient. The patient is not currently smoking. The risks related to smoking were reviewed with the patient. Recommend maintaining a smoke-free lifestyle. Patient is on a beta-blocker  Patient is on an ace-i/ARB  Patient is on a statin    Dual Antiplatelet therapy / anti-coagulation has not been recommended / prescribed for this patient. The patient verbalizes understanding not to stop medications without discussing with us. Discussed exercise: 30-60 minutes 7 days/week. Recommended regular exercise. Discussed Low saturated fat/JEANIE diet. Thank you for allowing to us to participate in the care of Izora Hashimoto.     Electronically signed by GHISLAINE Veras CNP on 10/29/2020 at 10:58 AM     Documentation of today's visit sent to PCP

## 2020-11-03 NOTE — TELEPHONE ENCOUNTER
Called [facility] pt for 04/05/2020 1:30pm with [facility]

## 2020-12-03 ENCOUNTER — APPOINTMENT (OUTPATIENT)
Dept: GENERAL RADIOLOGY | Age: 65
End: 2020-12-03
Payer: MEDICARE

## 2020-12-03 ENCOUNTER — HOSPITAL ENCOUNTER (EMERGENCY)
Age: 65
Discharge: LEFT BEFORE DISCHARGE | End: 2020-12-03
Payer: MEDICARE

## 2020-12-03 ENCOUNTER — HOSPITAL ENCOUNTER (OUTPATIENT)
Age: 65
Discharge: HOME | End: 2020-12-03
Payer: MEDICARE

## 2020-12-03 VITALS
TEMPERATURE: 98.5 F | SYSTOLIC BLOOD PRESSURE: 183 MMHG | BODY MASS INDEX: 26.03 KG/M2 | DIASTOLIC BLOOD PRESSURE: 160 MMHG | HEART RATE: 100 BPM | OXYGEN SATURATION: 95 % | HEIGHT: 66 IN | RESPIRATION RATE: 20 BRPM | WEIGHT: 162 LBS

## 2020-12-03 LAB
ANION GAP SERPL CALCULATED.3IONS-SCNC: 12 MMOL/L (ref 3–16)
BASOPHILS ABSOLUTE: 0.1 K/UL (ref 0–0.2)
BASOPHILS RELATIVE PERCENT: 0.5 %
BUN BLDV-MCNC: 25 MG/DL (ref 7–20)
CALCIUM SERPL-MCNC: 9.6 MG/DL (ref 8.3–10.6)
CHLORIDE BLD-SCNC: 99 MMOL/L (ref 99–110)
CO2: 29 MMOL/L (ref 21–32)
CREAT SERPL-MCNC: 0.8 MG/DL (ref 0.6–1.2)
EKG ATRIAL RATE: 98 BPM
EKG DIAGNOSIS: NORMAL
EKG P AXIS: 73 DEGREES
EKG P-R INTERVAL: 116 MS
EKG Q-T INTERVAL: 354 MS
EKG QRS DURATION: 96 MS
EKG QTC CALCULATION (BAZETT): 451 MS
EKG R AXIS: -23 DEGREES
EKG T AXIS: 68 DEGREES
EKG VENTRICULAR RATE: 98 BPM
EOSINOPHILS ABSOLUTE: 0 K/UL (ref 0–0.6)
EOSINOPHILS RELATIVE PERCENT: 0.4 %
GFR AFRICAN AMERICAN: >60
GFR NON-AFRICAN AMERICAN: >60
GLUCOSE BLD-MCNC: 80 MG/DL (ref 70–99)
HCT VFR BLD CALC: 40.8 % (ref 36–48)
HEMOGLOBIN: 13.3 G/DL (ref 12–16)
LYMPHOCYTES ABSOLUTE: 3.2 K/UL (ref 1–5.1)
LYMPHOCYTES RELATIVE PERCENT: 28.7 %
MCH RBC QN AUTO: 31.6 PG (ref 26–34)
MCHC RBC AUTO-ENTMCNC: 32.7 G/DL (ref 31–36)
MCV RBC AUTO: 96.5 FL (ref 80–100)
MONOCYTES ABSOLUTE: 1 K/UL (ref 0–1.3)
MONOCYTES RELATIVE PERCENT: 8.8 %
NEUTROPHILS ABSOLUTE: 6.9 K/UL (ref 1.7–7.7)
NEUTROPHILS RELATIVE PERCENT: 61.6 %
PDW BLD-RTO: 14.4 % (ref 12.4–15.4)
PLATELET # BLD: 350 K/UL (ref 135–450)
PMV BLD AUTO: 8.4 FL (ref 5–10.5)
POTASSIUM SERPL-SCNC: 3.6 MMOL/L (ref 3.5–5.1)
PRO-BNP: 87 PG/ML (ref 0–124)
RBC # BLD: 4.23 M/UL (ref 4–5.2)
SODIUM BLD-SCNC: 140 MMOL/L (ref 136–145)
TROPONIN: <0.01 NG/ML
WBC # BLD: 11.2 K/UL (ref 4–11)

## 2020-12-03 PROCEDURE — 93010 ELECTROCARDIOGRAM REPORT: CPT | Performed by: INTERNAL MEDICINE

## 2020-12-03 PROCEDURE — 80048 BASIC METABOLIC PNL TOTAL CA: CPT

## 2020-12-03 PROCEDURE — 36415 COLL VENOUS BLD VENIPUNCTURE: CPT

## 2020-12-03 PROCEDURE — 93005 ELECTROCARDIOGRAM TRACING: CPT | Performed by: STUDENT IN AN ORGANIZED HEALTH CARE EDUCATION/TRAINING PROGRAM

## 2020-12-03 PROCEDURE — 84484 ASSAY OF TROPONIN QUANT: CPT

## 2020-12-03 PROCEDURE — 85025 COMPLETE CBC W/AUTO DIFF WBC: CPT

## 2020-12-03 PROCEDURE — 83880 ASSAY OF NATRIURETIC PEPTIDE: CPT

## 2020-12-03 PROCEDURE — 4500000002 HC ER NO CHARGE

## 2020-12-03 NOTE — ED PROVIDER NOTES
EKG  The Ekg interpreted by me in the absence of a cardiologist shows. normal sinus rhythm with a rate of 98  Axis is   Normal  QTc is  normal  +incomplete RBBB  Anterior lateral TWI  No evidence of acute ischemia. No significant change from prior EKG dated 6/25/20    I only performed EKG interpretation and was not otherwise involved in the care of this patient.                Stef Qiu MD  12/03/20 1826

## 2020-12-08 ENCOUNTER — HOSPITAL ENCOUNTER (OUTPATIENT)
Age: 65
Discharge: HOME | End: 2020-12-08
Payer: MEDICARE

## 2020-12-08 LAB
ALBUMIN SERPL-MCNC: 3.7 G/DL (ref 3.4–5)
ALP BLD-CCNC: 100 U/L (ref 40–129)
ALT SERPL-CCNC: 57 U/L (ref 10–40)
AST SERPL-CCNC: 52 U/L (ref 15–37)
BILIRUB SERPL-MCNC: 1.2 MG/DL (ref 0–1)
BILIRUBIN DIRECT: 0.3 MG/DL (ref 0–0.3)
BILIRUBIN, INDIRECT: 0.9 MG/DL (ref 0–1)
TOTAL PROTEIN: 6.9 G/DL (ref 6.4–8.2)

## 2020-12-08 PROCEDURE — 36415 COLL VENOUS BLD VENIPUNCTURE: CPT

## 2020-12-08 PROCEDURE — 80076 HEPATIC FUNCTION PANEL: CPT

## 2021-01-05 ENCOUNTER — HOSPITAL ENCOUNTER (OUTPATIENT)
Age: 66
Discharge: HOME | End: 2021-01-05
Payer: MEDICARE

## 2021-01-05 LAB
ALBUMIN SERPL-MCNC: 3.9 G/DL (ref 3.4–5)
ALP BLD-CCNC: 103 U/L (ref 40–129)
ALT SERPL-CCNC: 29 U/L (ref 10–40)
AST SERPL-CCNC: 19 U/L (ref 15–37)
BILIRUB SERPL-MCNC: 0.8 MG/DL (ref 0–1)
BILIRUBIN DIRECT: <0.2 MG/DL (ref 0–0.3)
BILIRUBIN, INDIRECT: NORMAL MG/DL (ref 0–1)
TOTAL PROTEIN: 6.9 G/DL (ref 6.4–8.2)

## 2021-01-05 PROCEDURE — 36415 COLL VENOUS BLD VENIPUNCTURE: CPT

## 2021-01-05 PROCEDURE — 80076 HEPATIC FUNCTION PANEL: CPT

## 2021-02-08 ENCOUNTER — HOSPITAL ENCOUNTER (OUTPATIENT)
Age: 66
Discharge: HOME | End: 2021-02-08
Payer: MEDICARE

## 2021-02-08 LAB
ALBUMIN SERPL-MCNC: 4 G/DL (ref 3.4–5)
ALP BLD-CCNC: 88 U/L (ref 40–129)
ALT SERPL-CCNC: 23 U/L (ref 10–40)
AST SERPL-CCNC: 25 U/L (ref 15–37)
BILIRUB SERPL-MCNC: 1.4 MG/DL (ref 0–1)
BILIRUBIN DIRECT: 0.3 MG/DL (ref 0–0.3)
BILIRUBIN, INDIRECT: 1.1 MG/DL (ref 0–1)
TOTAL PROTEIN: 7.5 G/DL (ref 6.4–8.2)

## 2021-02-08 PROCEDURE — 36415 COLL VENOUS BLD VENIPUNCTURE: CPT

## 2021-02-08 PROCEDURE — 80076 HEPATIC FUNCTION PANEL: CPT

## 2021-03-17 ENCOUNTER — HOSPITAL ENCOUNTER (OUTPATIENT)
Age: 66
Discharge: HOME | End: 2021-03-17
Payer: MEDICARE

## 2021-03-17 ENCOUNTER — OFFICE VISIT (OUTPATIENT)
Dept: PULMONOLOGY | Age: 66
End: 2021-03-17
Payer: MEDICARE

## 2021-03-17 VITALS
BODY MASS INDEX: 26.03 KG/M2 | WEIGHT: 162 LBS | DIASTOLIC BLOOD PRESSURE: 78 MMHG | OXYGEN SATURATION: 97 % | HEIGHT: 66 IN | SYSTOLIC BLOOD PRESSURE: 110 MMHG | HEART RATE: 54 BPM

## 2021-03-17 DIAGNOSIS — J43.2 CENTRILOBULAR EMPHYSEMA (HCC): Primary | ICD-10-CM

## 2021-03-17 DIAGNOSIS — Z87.891 FORMER SMOKER: ICD-10-CM

## 2021-03-17 LAB
ALBUMIN SERPL-MCNC: 3.7 G/DL (ref 3.4–5)
ALP BLD-CCNC: 108 U/L (ref 40–129)
ALT SERPL-CCNC: 32 U/L (ref 10–40)
AST SERPL-CCNC: 27 U/L (ref 15–37)
BILIRUB SERPL-MCNC: 0.7 MG/DL (ref 0–1)
BILIRUBIN DIRECT: <0.2 MG/DL (ref 0–0.3)
BILIRUBIN, INDIRECT: NORMAL MG/DL (ref 0–1)
TOTAL PROTEIN: 7.5 G/DL (ref 6.4–8.2)

## 2021-03-17 PROCEDURE — G0296 VISIT TO DETERM LDCT ELIG: HCPCS | Performed by: INTERNAL MEDICINE

## 2021-03-17 PROCEDURE — 99204 OFFICE O/P NEW MOD 45 MIN: CPT | Performed by: INTERNAL MEDICINE

## 2021-03-17 PROCEDURE — 94664 DEMO&/EVAL PT USE INHALER: CPT | Performed by: INTERNAL MEDICINE

## 2021-03-17 PROCEDURE — 36415 COLL VENOUS BLD VENIPUNCTURE: CPT

## 2021-03-17 PROCEDURE — 80076 HEPATIC FUNCTION PANEL: CPT

## 2021-03-17 NOTE — PROGRESS NOTES
PULMONARY OFFICE NEW PATIENT VISIT    CONSULTING PHYSICIAN:      REASON FOR VISIT:   Chief Complaint   Patient presents with    New Patient     REF BY DR Vi Thompson    Shortness of Breath     WITH EXERTION    COPD       DATE OF VISIT: 3/17/2021    HISTORY OF PRESENT ILLNESS: 76y.o. year old female with past medical history of coronary s/p PCI, hypertension, autoimmune hepatitis on prednisone, former smoker who is here for evaluation of shortness of breath. Patient stated that she was diagnosed with emphysema 5 years ago. It was at that time she quit smoking. She was complaining of occasional dyspnea on exertion for which she was taking albuterol inhaler as needed. She developed Covid 19 infection in November 2020 for which she was hospitalized overnight and then discharged home. Symptoms resolved quickly except dyspnea on exertion. She gets short of breath and performing daily activities of her life. Symptoms are progressive. There is no cough or chest pain. She does complain of wheezing. Denies any runny nose stuffy nose or postnasal drip. Denies any acid reflux. Patient has been taking Advair inhaler twice a day and Anoro Ellipta every day. Her inhaler technique is poor. Patient also states that she has gained weight while being on prednisone since July 2020 for autoimmune hepatitis. Continue she is on 5 mg of prednisone which is being tapered slowly. Patient has extensive smoking history of of at least 30 pack years. She used to smoke 1 pack/day. Quit smoking 5 years ago. DIAGNOSTIC TEST REVIEWED:  No PFTs to review  I reviewed the chest x-ray from 6/25/2020 and my interpretation is as follows. No pulmonary infiltrates noted. REVIEW OF SYSTEMS:   CONSTITUTIONAL SYMPTOMS: The patient denies fever, fatigue, night sweats, weight loss or weight gain. HEENT: No vision changes. No tinnitus, Denies sinus pain. No hoarseness, or dysphagia.    NECK: Patient denies swelling in the neck.   CARDIOVASCULAR: Denies chest pain, palpitation, syncope. RESPIRATORY: See above   GASTROINTESTINAL: Denies nausea, abdominal pain or change in bowel function. GENITOURINARY: Denies obstructive symptoms. No history of incontinence. BREASTS: No masses or lumps in the breasts. SKIN: No rashes or itching. MUSCULOSKELETAL: Denies weakness or bone pain. NEUROLOGICAL: No headaches or seizures. PSYCHIATRIC: Denies mood swings or depression. ENDOCRINE: Denies heat or cold intolerance or excessive thirst.  HEMATOLOGIC/LYMPHATIC: Denies easy bruising or lymph node swelling. ALLERGIC/IMMUNOLOGIC: No environmental allergies.     PAST MEDICAL HISTORY:   Past Medical History:   Diagnosis Date    ETOH abuse     HTN (hypertension)     Pancreatitis     Shingles        PAST SURGICAL HISTORY:   Past Surgical History:   Procedure Laterality Date    ABDOMEN SURGERY      US GUIDED LIVER BIOPSY PERCUTANEOUS  7/2/2020    US GUIDED LIVER BIOPSY PERCUTANEOUS 7/2/2020 [facility] ULTRASOUND        SOCIAL HISTORY:   Social History     Tobacco Use    Smoking status: Never Smoker    Smokeless tobacco: Never Used   Substance Use Topics    Alcohol use: Not Currently    Drug use: Never       FAMILY HISTORY:   Family History   Problem Relation Age of Onset    Diabetes Sister        MEDICATIONS:     Current Outpatient Medications on File Prior to Visit   Medication Sig Dispense Refill    predniSONE (DELTASONE) 10 MG tablet TAKE 1 TABLET BY MOUTH 3 TIMES A DAY FOR 10 DAYS      metoprolol tartrate (LOPRESSOR) 25 MG tablet Take 0.5 tablets by mouth 2 times daily 60 tablet 3    folic acid (FOLVITE) 1 MG tablet Take 1 tablet by mouth daily 30 tablet 3    pantoprazole (PROTONIX) 40 MG tablet Take 1 tablet by mouth 2 times daily (before meals) 30 tablet 3    vitamin B-1 100 MG tablet Take 1 tablet by mouth daily 30 tablet 3    aclidinium (TUDORZA PRESSAIR) 400 MCG/ACT AEPB inhaler Inhale 1 puff into the lungs 2 times daily cyanosis. No edema of the lower extremities. SKIN OF BODY: No rash or jaundice. PSYCHIATRIC EVALUATION: Normal affect. Patient answers questions appropriately. HEMATOLOGIC/LYMPHATIC/ IMMUNOLOGIC: No palpable lymphadenopathy. NEUROLOGIC: Alert and oriented x 3. Groslly non-focal. Motor strength is 5+/5 in all muscle groups. The patient has a normal sensorium globally. LABS:  Lab Results   Component Value Date    WBC 11.2 (H) 12/03/2020    HGB 13.3 12/03/2020    HCT 40.8 12/03/2020     12/03/2020    TRIG 94 06/26/2020    ALT 23 02/08/2021    AST 25 02/08/2021     12/03/2020    K 3.6 12/03/2020    CL 99 12/03/2020    CREATININE 0.8 12/03/2020    BUN 25 (H) 12/03/2020    CO2 29 12/03/2020    INR 1.06 09/11/2020       Lab Results   Component Value Date    GLUCOSE 80 12/03/2020    CALCIUM 9.6 12/03/2020     12/03/2020    K 3.6 12/03/2020    CO2 29 12/03/2020    CL 99 12/03/2020    BUN 25 (H) 12/03/2020    CREATININE 0.8 12/03/2020           ASSESSMENT AND PLAN:     1. Centrilobular emphysema ([address]  With extensive smoking history, patient likely has COPD. I will obtain PFTs for further assessment. Patient is in her technique was poor. I personally went over inhaler technique with the patient in the clinic. I advised her to continue using Anoro Ellipta 1 puff daily. She will continue use albuterol inhaler as needed. - Full PFT Study With Bronchodilator; Future    2. Former smoker  Reviewed with the patient benefits of lung cancer screening which includes an increased chance of finding lung cancer early when it is more treatable. Also discussed the possible risks of screening including the risk of radiation exposure, false alarms, overtreatment, and need for more invasive procedures. Reviewed importance of adherence to annual CT screening and willingness to undergo full diagnostic work up and treatment if indicated. - CT Lung Screen (Initial or Annual);  Future      Return in about 8 weeks (around 5/12/2021). Kylee Castor MD  Pulmonary Critical Care and Sleep Medicine  Electronically signed by Kylee Castro MD on 3/17/2021 at 1:27 PM     This note was completed using dragon medical speech recognition software. Grammatical errors, random word insertions, pronoun errors and incomplete sentences are occasional consequences of this technology due to software limitations. If there are questions or concerns about the content of this note of information contained within the body of this dictation they should be addressed with the provider for clarification.

## 2021-03-22 ENCOUNTER — TELEPHONE (OUTPATIENT)
Dept: CASE MANAGEMENT | Age: 66
End: 2021-03-22

## 2021-03-22 NOTE — TELEPHONE ENCOUNTER
Chart reviewed including smoking history and recent order for lung scan. Smoking history updated in epic chart under history tab to qualify her for screening.  Will follow in the Lung Nodule Program.

## 2021-03-29 ENCOUNTER — OFFICE VISIT (OUTPATIENT)
Dept: PRIMARY CARE CLINIC | Age: 66
End: 2021-03-29
Payer: MEDICARE

## 2021-03-29 DIAGNOSIS — Z20.828 EXPOSURE TO SARS-ASSOCIATED CORONAVIRUS: Primary | ICD-10-CM

## 2021-03-29 PROCEDURE — 99211 OFF/OP EST MAY X REQ PHY/QHP: CPT | Performed by: NURSE PRACTITIONER

## 2021-03-29 NOTE — PROGRESS NOTES
Chaz Marroquin received a viral test for COVID-19. They were educated on isolation and quarantine as appropriate. For any symptoms, they were directed to seek care from their PCP, given contact information to establish with a doctor, directed to an urgent care or the emergency room.

## 2021-03-29 NOTE — PROGRESS NOTES
mouth daily 30 tablet 3    aclidinium (TUDORZA PRESSAIR) 400 MCG/ACT AEPB inhaler Inhale 1 puff into the lungs 2 times daily      albuterol sulfate HFA (PROAIR HFA) 108 (90 Base) MCG/ACT inhaler Inhale 2 puffs into the lungs every 6 hours as needed for Wheezing      aspirin 81 MG chewable tablet Take 81 mg by mouth daily      atorvastatin (LIPITOR) 80 MG tablet Take 80 mg by mouth daily      gabapentin (NEURONTIN) 300 MG capsule Take 300 mg by mouth 3 times daily.  hydroCHLOROthiazide (HYDRODIURIL) 25 MG tablet Take 25 mg by mouth daily      ipratropium-albuterol (DUONEB) 0.5-2.5 (3) MG/3ML SOLN nebulizer solution Inhale 1 vial into the lungs every 4 hours      losartan (COZAAR) 100 MG tablet Take 100 mg by mouth daily      umeclidinium-vilanterol (ANORO ELLIPTA) 62.5-25 MCG/INH AEPB inhaler Inhale 1 puff into the lungs daily       No current facility-administered medications for this visit.         Allergies:  Lisinopril and Dilaudid [hydromorphone hcl]     Social History:  Social History     Socioeconomic History    Marital status: Single     Spouse name: Not on file    Number of children: Not on file    Years of education: Not on file    Highest education level: Not on file   Occupational History    Not on file   Social Needs    Financial resource strain: Not on file    Food insecurity     Worry: Not on file     Inability: Not on file   [employer] needs     Medical: Not on file     Non-medical: Not on file   Tobacco Use    Smoking status: Former Smoker     Packs/day: 1.00     Years: 30.00     Pack years: 30.00     Quit date: 2016     Years since quittin.2    Smokeless tobacco: Never Used    Tobacco comment: documented in the chart that she was a heavy smoker in the past with 30 pack year   Substance and Sexual Activity    Alcohol use: Not Currently    Drug use: Never    Sexual activity: Not on file   Lifestyle    Physical activity     Days per week: Not on file     Minutes Weight: 173 lb 14.4 oz (78.9 kg)   Height: 5' 6\" (1.676 m)     Body mass index is 28.07 kg/m². Wt Readings from Last 3 Encounters:   04/05/21 173 lb 14.4 oz (78.9 kg)   03/17/21 162 lb (73.5 kg)   12/03/20 162 lb (73.5 kg)      BP Readings from Last 3 Encounters:   04/05/21 124/72   03/17/21 110/78   12/03/20 (!) 183/160        Physical Examination:    · CONSTITUTIONAL: Well developed, well nourished  · EYES: PERRLA. No xanthelasma, sclera non icteric  · EARS,NOSE,MOUTH,THROAT:  Mucous membranes moist, normal hearing  · NECK: Supple, JVP normal, thyroid not enlarged. Carotids 2+ without bruits  · RESPIRATORY: Normal effort, no rales or rhonchi  · CARDIOVASCULAR: Normal PMI, regular rate and rhythm, no murmurs, rub or gallop. No edema. Radial pulses present and equal  · CHEST: No scar or masses  · ABDOMEN: Normal bowel sounds. No masses or tenderness. No bruit  · MUSCULOSKELETAL: No clubbing or cyanosis. Moves all extremities well. Normal gait  · SKIN:  Warm and dry. No rashes  · NEUROLOGIC: Cranial nerves intact. Alert and oriented  · PSYCHIATRIC: Calm affect. Appears to have normal judgement and insight    All testing and labs listed below were personally reviewed by myself. Stress 6/25/20  Impression  Normal LV wall motion and function  Normal myocardial perfusion       Echo 6/24/20  Summary  Normal left ventricle size, wall thickness, and systolic function with an  estimated ejection fraction of 70%. No regional wall motion abnormalities are seen. Normal diastolic function. Avg. E/e'=11.35    Cardiac cath [facility] 9/22/2016:  Coronary angiography:   1. LM is of large caliber and gives rise to the LAD and left   circumflex coronary arteries. It is free of significant disease  2. LAD system is large caliber system and has diffuse mild   disease 20-30% without any significant obstructive lesion. It   gives rise to 2 small caliber diagonal that has moderate to   severe ostial disease.     3. LCx system is large caliber and gives rise to medium caliber   OM1. OM1 has 99% subtotal occlusion followed by sequential 85%   lesion with JANN 2 flow. 4. RCA system is large-caliber system and has mild diffuse   disease with focal lesion in the midsegment about 80% tubular   lesion in between mid and distal segment about 85%. It   bifurcates. Posterior lateral system that has luminal   irregularities. Impression:  Successful PCI to the OM1 99% subtotal occlusion with   drug-eluting senna G2 0.25 x 28 mm stent   Mild disease in LAD   Severe disease in the RCA   Preserved LV Function       Assessment/Plan  1. Coronary artery disease due to lipid rich plaque    2. Hypertension, unspecified type    3. Hyperlipidemia, unspecified hyperlipidemia type          CAD (coronary artery disease)  Angina~ Atypical, likely noncardiac. sharp pain that occurred 1-2 times since hospital stay  CCS class 1  Intervention  Stress~ 6/25/20 normal stress  LHC~ PCI to OM1 2016  Echo~  6/24/20 EF 70% normal function  Current meds~ asa  Plan~ no bleeding issues, continue plan    HTN (hypertension)  Controlled  Meds~ losartan / hctz / Lopressor  Plan~ BP stable, no med changes      Hyperlipidemia  10/2020~  / TG 66 /  / LDL 60  meds - lipitor  Plan~ she has labs ordered for June with her Life line screening      No orders of the defined types were placed in this encounter. Follow up in 1 year    Navi Luna MD      Thank you for allowing to me to participate in the care of Cecille Corona. Scribe's Attestation: This note was scribed in the presence of Dr. Sharonda Donovan MD by Arline Winston RN.    I, Dr. Sharonda Donovan, personally performed the services described in this documentation, as scribed by the above signed scribe in my presence. It is both accurate and complete to my knowledge.  I agree with the details independently gathered by the clinical support staff, while the remaining scribed note accurately describes my personal service to the patient.

## 2021-03-30 LAB — SARS-COV-2: NOT DETECTED

## 2021-03-31 ENCOUNTER — HOSPITAL ENCOUNTER (OUTPATIENT)
Dept: PULMONOLOGY | Age: 66
Discharge: HOME | End: 2021-03-31
Payer: MEDICARE

## 2021-03-31 ENCOUNTER — HOSPITAL ENCOUNTER (OUTPATIENT)
Dept: CT IMAGING | Age: 66
Discharge: HOME | End: 2021-03-31
Payer: MEDICARE

## 2021-03-31 VITALS — HEART RATE: 68 BPM | OXYGEN SATURATION: 100 % | RESPIRATION RATE: 16 BRPM

## 2021-03-31 DIAGNOSIS — Z87.891 FORMER SMOKER: ICD-10-CM

## 2021-03-31 DIAGNOSIS — J43.2 CENTRILOBULAR EMPHYSEMA (HCC): ICD-10-CM

## 2021-03-31 LAB
DLCO %PRED: 22 %
DLCO PRED: NORMAL
DLCO/VA %PRED: NORMAL
DLCO/VA PRED: NORMAL
DLCO/VA: NORMAL
DLCO: NORMAL
EXPIRATORY TIME-POST: NORMAL
EXPIRATORY TIME: NORMAL
FEF 25-75% %CHNG: NORMAL
FEF 25-75% %PRED-POST: NORMAL
FEF 25-75% %PRED-PRE: NORMAL
FEF 25-75% PRED: NORMAL
FEF 25-75%-POST: NORMAL
FEF 25-75%-PRE: NORMAL
FEV1 %PRED-POST: 35 %
FEV1 %PRED-PRE: 34 %
FEV1 PRED: NORMAL
FEV1-POST: NORMAL
FEV1-PRE: NORMAL
FEV1/FVC %PRED-POST: NORMAL
FEV1/FVC %PRED-PRE: NORMAL
FEV1/FVC PRED: NORMAL
FEV1/FVC-POST: 54 %
FEV1/FVC-PRE: 54 %
FVC %PRED-POST: NORMAL
FVC %PRED-PRE: NORMAL
FVC PRED: NORMAL
FVC-POST: NORMAL
FVC-PRE: NORMAL
GAW %PRED: NORMAL
GAW PRED: NORMAL
GAW: NORMAL
IC %PRED: NORMAL
IC PRED: NORMAL
IC: NORMAL
MEP: NORMAL
MIP: NORMAL
MVV %PRED-PRE: NORMAL
MVV PRED: NORMAL
MVV-PRE: NORMAL
PEF %PRED-POST: NORMAL
PEF %PRED-PRE: NORMAL
PEF PRED: NORMAL
PEF%CHNG: NORMAL
PEF-POST: NORMAL
PEF-PRE: NORMAL
RAW %PRED: NORMAL
RAW PRED: NORMAL
RAW: NORMAL
RV %PRED: NORMAL
RV PRED: NORMAL
RV: NORMAL
SVC %PRED: NORMAL
SVC PRED: NORMAL
SVC: NORMAL
TLC %PRED: 291 %
TLC PRED: NORMAL
TLC: NORMAL
VA %PRED: NORMAL
VA PRED: NORMAL
VA: NORMAL
VTG %PRED: NORMAL
VTG PRED: NORMAL
VTG: NORMAL

## 2021-03-31 PROCEDURE — 6370000000 HC RX 637 (ALT 250 FOR IP): Performed by: INTERNAL MEDICINE

## 2021-03-31 PROCEDURE — 71271 CT THORAX LUNG CANCER SCR C-: CPT

## 2021-03-31 PROCEDURE — 94729 DIFFUSING CAPACITY: CPT

## 2021-03-31 PROCEDURE — 94200 LUNG FUNCTION TEST (MBC/MVV): CPT

## 2021-03-31 PROCEDURE — 94726 PLETHYSMOGRAPHY LUNG VOLUMES: CPT

## 2021-03-31 PROCEDURE — 94760 N-INVAS EAR/PLS OXIMETRY 1: CPT

## 2021-03-31 PROCEDURE — 94060 EVALUATION OF WHEEZING: CPT

## 2021-03-31 RX ORDER — ALBUTEROL SULFATE 90 UG/1
4 AEROSOL, METERED RESPIRATORY (INHALATION) ONCE
Status: COMPLETED | OUTPATIENT
Start: 2021-03-31 | End: 2021-03-31

## 2021-03-31 RX ADMIN — Medication 4 PUFF: at 08:22

## 2021-03-31 ASSESSMENT — PULMONARY FUNCTION TESTS
FEV1/FVC_PRE: 54
FEV1_PERCENT_PREDICTED_POST: 35
FEV1_PERCENT_PREDICTED_PRE: 34
FEV1/FVC_POST: 54

## 2021-04-01 PROBLEM — E78.5 HYPERLIPIDEMIA: Status: ACTIVE | Noted: 2021-04-01

## 2021-04-01 NOTE — PROCEDURES
[facility]                     [facility], [address]                               PULMONARY FUNCTION    PATIENT NAME: Kavitha Osman                   :        1955  MED REC NO:   [id number]                          ROOM:  ACCOUNT NO:   [de-identified]                           ADMIT DATE: 2021  PROVIDER:     Akash Whitten MD    DATE OF PROCEDURE:  2021    Spirometry reveals decreased FVC at 1.76 liters which is 62%. FEV1 is  decreased at 0.75 liters which is 34%. FEV1/FVC ratio is reduced at  42%. Expiratory flow rates are dramatically reduced. There is no  significant change after inhaled bronchodilators. Lung volumes reveal  increased total lung capacity at 291% predicted, vital capacity is  reduced at 74% predicted, residual volume is increased at 629%  predicted. Diffusion capacity is reduced at 22% predicted. IMPRESSION:  Severe to very severe obstructive lung disease with  hyperinflation. There is no response to inhaled bronchodilators.         Cecilia Arita MD    D: 2021 11:33:12       T: 2021 12:33:52     [id number]  Job#: [id number]     Doc#: [id number]    CC:

## 2021-04-01 NOTE — ASSESSMENT & PLAN NOTE
10/2020~  / TG 66 /  / LDL 60  meds - lipitor  Plan~ she has labs ordered for June with her Life line screening

## 2021-04-01 NOTE — ASSESSMENT & PLAN NOTE
Angina~ Atypical, likely noncardiac. sharp pain that occurred 1-2 times since hospital stay  CCS class 1  Intervention  Stress~ 6/25/20 normal stress  LHC~ PCI to OM1 2016  Echo~  6/24/20 EF 70% normal function  Current meds~ asa  Plan~ no bleeding issues, continue plan

## 2021-04-05 ENCOUNTER — OFFICE VISIT (OUTPATIENT)
Dept: CARDIOLOGY CLINIC | Age: 66
End: 2021-04-05
Payer: MEDICARE

## 2021-04-05 VITALS
BODY MASS INDEX: 27.95 KG/M2 | HEART RATE: 54 BPM | DIASTOLIC BLOOD PRESSURE: 72 MMHG | WEIGHT: 173.9 LBS | OXYGEN SATURATION: 94 % | HEIGHT: 66 IN | SYSTOLIC BLOOD PRESSURE: 124 MMHG

## 2021-04-05 DIAGNOSIS — I25.10 CORONARY ARTERY DISEASE DUE TO LIPID RICH PLAQUE: Primary | ICD-10-CM

## 2021-04-05 DIAGNOSIS — E78.5 HYPERLIPIDEMIA, UNSPECIFIED HYPERLIPIDEMIA TYPE: ICD-10-CM

## 2021-04-05 DIAGNOSIS — I25.83 CORONARY ARTERY DISEASE DUE TO LIPID RICH PLAQUE: Primary | ICD-10-CM

## 2021-04-05 DIAGNOSIS — I10 HYPERTENSION, UNSPECIFIED TYPE: ICD-10-CM

## 2021-04-05 PROCEDURE — 99214 OFFICE O/P EST MOD 30 MIN: CPT | Performed by: INTERNAL MEDICINE

## 2021-04-28 ENCOUNTER — OFFICE VISIT (OUTPATIENT)
Dept: PULMONOLOGY | Age: 66
End: 2021-04-28
Payer: MEDICARE

## 2021-04-28 VITALS
SYSTOLIC BLOOD PRESSURE: 122 MMHG | HEIGHT: 66 IN | OXYGEN SATURATION: 95 % | DIASTOLIC BLOOD PRESSURE: 80 MMHG | HEART RATE: 62 BPM | WEIGHT: 173 LBS | BODY MASS INDEX: 27.8 KG/M2

## 2021-04-28 DIAGNOSIS — J44.9 COPD, SEVERE (HCC): Primary | ICD-10-CM

## 2021-04-28 PROCEDURE — 99214 OFFICE O/P EST MOD 30 MIN: CPT | Performed by: INTERNAL MEDICINE

## 2021-04-28 RX ORDER — FLUTICASONE FUROATE, UMECLIDINIUM BROMIDE AND VILANTEROL TRIFENATATE 100; 62.5; 25 UG/1; UG/1; UG/1
1 POWDER RESPIRATORY (INHALATION) DAILY
Qty: 3 EACH | Refills: 3 | Status: SHIPPED | OUTPATIENT
Start: 2021-04-28 | End: 2022-03-04

## 2021-04-28 NOTE — PROGRESS NOTES
PULMONARY OFFICE FOLLOW UP NOTE    REASON FOR VISIT:   Chief Complaint   Patient presents with    Results     PFT & CT       DATE OF VISIT: 4/28/2021    HISTORY OF PRESENT ILLNESS: 76y.o. year old female former smoker is here for follow-up of COPD. She has past medical history of coronary artery disease s/p PCI, hypertension, autoimmune hepatitis on prednisone. Patient's bronchodilator regimen consist of Anoro Ellipta 1 puff daily. She has noticed improvement in dyspnea on exertion but still continues to get dyspnea on exertion sometimes. Denies any cough or wheezing or chest pain or hemoptysis. PFT showed severe obstruction with FEV1 35%. CT lung cancer screening did not show any abnormal lung nodules. Previously: Patient stated that she was diagnosed with emphysema 5 years ago. It was at that time she quit smoking. She was complaining of occasional dyspnea on exertion for which she was taking albuterol inhaler as needed. She developed Covid 19 infection in November 2020 for which she was hospitalized overnight and then discharged home. Symptoms resolved quickly except dyspnea on exertion. She gets short of breath and performing daily activities of her life. Symptoms are progressive. There is no cough or chest pain. She does complain of wheezing. Denies any runny nose stuffy nose or postnasal drip. Denies any acid reflux. Patient has been taking Advair inhaler twice a day and Anoro Ellipta every day. Her inhaler technique is poor. Patient also states that she has gained weight while being on prednisone since July 2020 for autoimmune hepatitis. Continue she is on 5 mg of prednisone which is being tapered slowly. DIAGNOSTIC TEST REVIEWED:  I reviewed the PFT from 4/1/2021 and my interpretation is as follows. Severe obstructive airway disease with hyperinflation and reduced diffusion capacity. FEV1/FVC 42  FEV1 35%, 0.78 L  FVC 66%, 1.87 L TLC 91%, 15.8.   DLCO 22%    CT chest lung  metoprolol tartrate (LOPRESSOR) 25 MG tablet Take 0.5 tablets by mouth 2 times daily 60 tablet 3    folic acid (FOLVITE) 1 MG tablet Take 1 tablet by mouth daily 30 tablet 3    pantoprazole (PROTONIX) 40 MG tablet Take 1 tablet by mouth 2 times daily (before meals) 30 tablet 3    vitamin B-1 100 MG tablet Take 1 tablet by mouth daily 30 tablet 3    aclidinium (TUDORZA PRESSAIR) 400 MCG/ACT AEPB inhaler Inhale 1 puff into the lungs 2 times daily      albuterol sulfate HFA (PROAIR HFA) 108 (90 Base) MCG/ACT inhaler Inhale 2 puffs into the lungs every 6 hours as needed for Wheezing      aspirin 81 MG chewable tablet Take 81 mg by mouth daily      atorvastatin (LIPITOR) 80 MG tablet Take 80 mg by mouth daily      gabapentin (NEURONTIN) 300 MG capsule Take 300 mg by mouth 3 times daily.  hydroCHLOROthiazide (HYDRODIURIL) 25 MG tablet Take 25 mg by mouth daily      ipratropium-albuterol (DUONEB) 0.5-2.5 (3) MG/3ML SOLN nebulizer solution Inhale 1 vial into the lungs every 4 hours      losartan (COZAAR) 100 MG tablet Take 100 mg by mouth daily      umeclidinium-vilanterol (ANORO ELLIPTA) 62.5-25 MCG/INH AEPB inhaler Inhale 1 puff into the lungs daily       No current facility-administered medications on file prior to visit. ALLERGIES:   Allergies as of 04/28/2021 - Review Complete 04/28/2021   Allergen Reaction Noted    Lisinopril Swelling 06/25/2020    Dilaudid [hydromorphone hcl]  07/02/2020      OBJECTIVE:   height is 5' 6\" (1.676 m) and weight is 173 lb (78.5 kg). Her blood pressure is 122/80 and her pulse is 62. Her oxygen saturation is 95%. PHYSICAL EXAM:    CONSTITUTIONAL: She is a 76y.o.-year-old who appears her stated age. She is alert and oriented x 3 and in no acute distress. HEENT: PERRL. No scleral icterus. No thrush, atraumatic, normocephalic. NECK: Supple, without cervical or supraclavicular lymphadenopathy:  CARDIOVASCULAR: S1 S2 RRR.  Without

## 2021-05-06 RX ORDER — LOSARTAN POTASSIUM 100 MG/1
100 TABLET ORAL DAILY
Qty: 30 TABLET | Refills: 6 | OUTPATIENT
Start: 2021-05-06

## 2021-06-08 ENCOUNTER — HOSPITAL ENCOUNTER (OUTPATIENT)
Dept: CARDIAC REHAB | Age: 66
Setting detail: THERAPIES SERIES
Discharge: HOME | End: 2021-06-08
Payer: MEDICARE

## 2021-06-08 NOTE — PROGRESS NOTES
Patient called this morning and left a message that she has transportation issue and will be unable to make appointment for initial pulmonary rehab.

## 2021-08-02 ENCOUNTER — OFFICE VISIT (OUTPATIENT)
Dept: PULMONOLOGY | Age: 66
End: 2021-08-02
Payer: MEDICARE

## 2021-08-02 VITALS — HEART RATE: 62 BPM | OXYGEN SATURATION: 95 %

## 2021-08-02 DIAGNOSIS — J44.9 COPD, SEVERE (HCC): Primary | ICD-10-CM

## 2021-08-02 PROCEDURE — 99214 OFFICE O/P EST MOD 30 MIN: CPT | Performed by: INTERNAL MEDICINE

## 2021-08-02 RX ORDER — FLUTICASONE FUROATE, UMECLIDINIUM BROMIDE AND VILANTEROL TRIFENATATE 100; 62.5; 25 UG/1; UG/1; UG/1
1 POWDER RESPIRATORY (INHALATION) DAILY
Qty: 1 EACH | Refills: 6 | Status: SHIPPED | OUTPATIENT
Start: 2021-08-02 | End: 2022-05-11 | Stop reason: SDUPTHER

## 2021-08-02 NOTE — PROGRESS NOTES
PULMONARY OFFICE FOLLOW UP NOTE    REASON FOR VISIT:   Chief Complaint   Patient presents with    Shortness of Breath     3 month f.u. DATE OF VISIT: 8/2/2021    HISTORY OF PRESENT ILLNESS: 78y.o. year old female former smoker is here for follow-up of COPD. She has past medical history of coronary artery disease s/p PCI, hypertension, autoimmune hepatitis on prednisone.      Patient states improvement in shortness of breath. Denies cough or wheezing or chest pain or hemoptysis. Her bronchodilator regimen consist of Trelegy Ellipta 1 puff daily. Did not start pulmonary rehab yet. PFT showed severe obstruction with FEV1 35%. CT lung cancer screening did not show any abnormal lung nodules.     Previously: Patient stated that she was diagnosed with emphysema 5 years ago. Jeronimo Ramos was at that time she quit smoking. Varghese Null was complaining of occasional dyspnea on exertion for which she was taking albuterol inhaler as needed.  She developed Covid 19 infection in November 2020 for which she was hospitalized overnight and then discharged home.  Symptoms resolved quickly except dyspnea on exertion.  She gets short of breath and performing daily activities of her life.  Symptoms are progressive. Mauri King is no cough or chest pain.  She does complain of wheezing.  Denies any runny nose stuffy nose or postnasal drip.  Denies any acid reflux.  Patient has been taking Advair inhaler twice a day and Anoro Ellipta every day.  Her inhaler technique is poor.  Patient also states that she has gained weight while being on prednisone since July 2020 for autoimmune hepatitis.  Continue she is on 5 mg of prednisone which is being tapered slowly.     DIAGNOSTIC TEST REVIEWED:  I reviewed the PFT from 4/1/2021 and my interpretation is as follows. Severe obstructive airway disease with hyperinflation and reduced diffusion capacity. FEV1/FVC 42  FEV1 35%, 0.78 L  FVC 66%, 1.87 L TLC 91%, 15.8.   DLCO 22%     CT chest lung cancer screening performed on 3/31/2021 did not show any abnormal lung nodules.         REVIEW OF SYSTEMS:    CONSTITUTIONAL SYMPTOMS: The patient denies fever, fatigue, night sweats, weight loss or weight gain. HEENT: No vision changes. No tinnitus, Denies sinus pain. No hoarseness, or dysphagia. NECK: Patient denies swelling in the neck. CARDIOVASCULAR: Denies chest pain, palpitation, syncope. RESPIRATORY: See above  GASTROINTESTINAL: Denies nausea, abdominal pain or change in bowel function. GENITOURINARY: Denies obstructive symptoms. No history of incontinence. BREASTS: No masses or lumps in the breasts. SKIN: No rashes or itching. MUSCULOSKELETAL: Denies weakness or bone pain. NEUROLOGICAL: No headaches or seizures. PSYCHIATRIC: Denies mood swings or depression. ENDOCRINE: Denies heat or cold intolerance or excessive thirst.  HEMATOLOGIC/LYMPHATIC: Denies easy bruising or lymph node swelling. ALLERGIC/IMMUNOLOGIC: No environmental allergies.     PAST MEDICAL HISTORY:   Past Medical History:   Diagnosis Date    ETOH abuse     HTN (hypertension)     Pancreatitis     Shingles        PAST SURGICAL HISTORY:   Past Surgical History:   Procedure Laterality Date    ABDOMEN SURGERY      US GUIDED LIVER BIOPSY PERCUTANEOUS  2020    US GUIDED LIVER BIOPSY PERCUTANEOUS 2020 [facility] ULTRASOUND       SOCIAL HISTORY:   Social History     Tobacco Use    Smoking status: Former Smoker     Packs/day: 1.00     Years: 30.00     Pack years: 30.00     Quit date: 2016     Years since quittin.5    Smokeless tobacco: Never Used    Tobacco comment: documented in the chart that she was a heavy smoker in the past with 30 pack year   Vaping Use    Vaping Use: Never used   Substance Use Topics    Alcohol use: Not Currently    Drug use: Never       FAMILY HISTORY:   Family History   Problem Relation Age of Onset    Diabetes Sister        MEDICATIONS:     Current Outpatient Medications on File Prior to positive bowel sounds in all quadrants, non-distended, without hepatosplenomegaly. GENITOURINARY: Deferred. MUSCULOSKELETAL: No tenderness to palpation of the axial skeleton. There is no clubbing. No cyanosis. No edema of the lower extremities. SKIN OF BODY: No rash or jaundice. PSYCHIATRIC EVALUATION: Normal affect. Patient answers questions appropriately. HEMATOLOGIC/LYMPHATIC/ IMMUNOLOGIC: No palpable lymphadenopathy. NEUROLOGIC: Alert and oriented x 3. Groslly non-focal. Motor strength is 5+/5 in all muscle groups. The patient has a normal sensorium globally. ASSESSMENT AND PLAN:     1. COPD, severe ([address] FEV1 35% and DLCO 22%  Continue trilogy Ellipta 1 puff daily  Continue albuterol inhaler as needed  I will refer her again for pulmonary rehabilitation    - fluticasone-umeclidin-vilant (Nelta Miners) 100-62.5-25 MCG/INH AEPB; Inhale 1 puff into the lungs daily  Dispense: 1 each; Refill: 6  -   [address]      2. Former smoker  CT lung cancer screening from March 21, 2021 did not show any abnormal lung nodules. Patient will continue to perform annual lung cancer screening CT chest.      Return in about 4 months (around 12/2/2021). Adriane Casas MD  Pulmonary Critical Care and Sleep Medicine  Electronically signed by Adriane Casas MD on 8/2/2021 at 1:04 PM     This note was completed using dragon medical speech recognition software. Grammatical errors, random word insertions, pronoun errors and incomplete sentences are occasional consequences of this technology due to software limitations. If there are questions or concerns about the content of this note of information contained within the body of this dictation they should be addressed with the provider for clarification.

## 2021-08-22 LAB — URINE CULTURE, ROUTINE: NORMAL

## 2021-09-09 ENCOUNTER — HOSPITAL ENCOUNTER (OUTPATIENT)
Age: 66
Discharge: HOME | End: 2021-09-09
Payer: MEDICARE

## 2021-09-09 LAB
ALBUMIN SERPL-MCNC: 3.7 G/DL (ref 3.4–5)
ALP BLD-CCNC: 136 U/L (ref 40–129)
ALT SERPL-CCNC: 55 U/L (ref 10–40)
ANION GAP SERPL CALCULATED.3IONS-SCNC: 8 MMOL/L (ref 3–16)
AST SERPL-CCNC: 58 U/L (ref 15–37)
BASOPHILS ABSOLUTE: 0 K/UL (ref 0–0.2)
BASOPHILS RELATIVE PERCENT: 0.6 %
BILIRUB SERPL-MCNC: 0.8 MG/DL (ref 0–1)
BILIRUBIN DIRECT: <0.2 MG/DL (ref 0–0.3)
BILIRUBIN, INDIRECT: ABNORMAL MG/DL (ref 0–1)
BUN BLDV-MCNC: 18 MG/DL (ref 7–20)
CALCIUM SERPL-MCNC: 9.7 MG/DL (ref 8.3–10.6)
CHLORIDE BLD-SCNC: 99 MMOL/L (ref 99–110)
CO2: 26 MMOL/L (ref 21–32)
CREAT SERPL-MCNC: 0.7 MG/DL (ref 0.6–1.2)
EOSINOPHILS ABSOLUTE: 0.1 K/UL (ref 0–0.6)
EOSINOPHILS RELATIVE PERCENT: 1.7 %
GFR AFRICAN AMERICAN: >60
GFR NON-AFRICAN AMERICAN: >60
GLUCOSE BLD-MCNC: 93 MG/DL (ref 70–99)
HCT VFR BLD CALC: 34.7 % (ref 36–48)
HEMOGLOBIN: 11.3 G/DL (ref 12–16)
INR BLD: 1.07 (ref 0.88–1.12)
LYMPHOCYTES ABSOLUTE: 2.7 K/UL (ref 1–5.1)
LYMPHOCYTES RELATIVE PERCENT: 39.3 %
MCH RBC QN AUTO: 29.1 PG (ref 26–34)
MCHC RBC AUTO-ENTMCNC: 32.6 G/DL (ref 31–36)
MCV RBC AUTO: 89.3 FL (ref 80–100)
MONOCYTES ABSOLUTE: 0.8 K/UL (ref 0–1.3)
MONOCYTES RELATIVE PERCENT: 11 %
NEUTROPHILS ABSOLUTE: 3.3 K/UL (ref 1.7–7.7)
NEUTROPHILS RELATIVE PERCENT: 47.4 %
PDW BLD-RTO: 15.5 % (ref 12.4–15.4)
PLATELET # BLD: 263 K/UL (ref 135–450)
PMV BLD AUTO: 8.6 FL (ref 5–10.5)
POTASSIUM SERPL-SCNC: 3.3 MMOL/L (ref 3.5–5.1)
PROTHROMBIN TIME: 12.1 SEC (ref 9.9–12.7)
RBC # BLD: 3.88 M/UL (ref 4–5.2)
SODIUM BLD-SCNC: 133 MMOL/L (ref 136–145)
TOTAL PROTEIN: 8.4 G/DL (ref 6.4–8.2)
WBC # BLD: 6.9 K/UL (ref 4–11)

## 2021-09-09 PROCEDURE — 36415 COLL VENOUS BLD VENIPUNCTURE: CPT

## 2021-09-09 PROCEDURE — 85025 COMPLETE CBC W/AUTO DIFF WBC: CPT

## 2021-09-09 PROCEDURE — 80076 HEPATIC FUNCTION PANEL: CPT

## 2021-09-09 PROCEDURE — 83550 IRON BINDING TEST: CPT

## 2021-09-09 PROCEDURE — 83540 ASSAY OF IRON: CPT

## 2021-09-09 PROCEDURE — 82728 ASSAY OF FERRITIN: CPT

## 2021-09-09 PROCEDURE — 85610 PROTHROMBIN TIME: CPT

## 2021-09-09 PROCEDURE — 80048 BASIC METABOLIC PNL TOTAL CA: CPT

## 2021-09-10 LAB
FERRITIN: 462.7 NG/ML (ref 15–150)
IRON SATURATION: 21 % (ref 15–50)
IRON: 45 UG/DL (ref 37–145)
TOTAL IRON BINDING CAPACITY: 219 UG/DL (ref 260–445)

## 2021-09-13 ENCOUNTER — HOSPITAL ENCOUNTER (OUTPATIENT)
Age: 66
Discharge: HOME | End: 2021-09-13
Payer: MEDICARE

## 2021-09-13 LAB
ALBUMIN SERPL-MCNC: 4 G/DL (ref 3.4–5)
ALP BLD-CCNC: 130 U/L (ref 40–129)
ALT SERPL-CCNC: 41 U/L (ref 10–40)
ANION GAP SERPL CALCULATED.3IONS-SCNC: 11 MMOL/L (ref 3–16)
AST SERPL-CCNC: 33 U/L (ref 15–37)
BASOPHILS ABSOLUTE: 0.1 K/UL (ref 0–0.2)
BASOPHILS RELATIVE PERCENT: 0.6 %
BILIRUB SERPL-MCNC: 0.7 MG/DL (ref 0–1)
BILIRUBIN DIRECT: <0.2 MG/DL (ref 0–0.3)
BILIRUBIN, INDIRECT: ABNORMAL MG/DL (ref 0–1)
BUN BLDV-MCNC: 18 MG/DL (ref 7–20)
CALCIUM SERPL-MCNC: 10.1 MG/DL (ref 8.3–10.6)
CHLORIDE BLD-SCNC: 102 MMOL/L (ref 99–110)
CO2: 26 MMOL/L (ref 21–32)
CREAT SERPL-MCNC: 0.7 MG/DL (ref 0.6–1.2)
EOSINOPHILS ABSOLUTE: 0 K/UL (ref 0–0.6)
EOSINOPHILS RELATIVE PERCENT: 0.4 %
GFR AFRICAN AMERICAN: >60
GFR NON-AFRICAN AMERICAN: >60
GLUCOSE BLD-MCNC: 102 MG/DL (ref 70–99)
HCT VFR BLD CALC: 35 % (ref 36–48)
HEMOGLOBIN: 11.5 G/DL (ref 12–16)
INR BLD: 1.11 (ref 0.88–1.12)
LYMPHOCYTES ABSOLUTE: 4.2 K/UL (ref 1–5.1)
LYMPHOCYTES RELATIVE PERCENT: 41.1 %
MCH RBC QN AUTO: 29 PG (ref 26–34)
MCHC RBC AUTO-ENTMCNC: 32.8 G/DL (ref 31–36)
MCV RBC AUTO: 88.4 FL (ref 80–100)
MONOCYTES ABSOLUTE: 0.7 K/UL (ref 0–1.3)
MONOCYTES RELATIVE PERCENT: 6.8 %
NEUTROPHILS ABSOLUTE: 5.3 K/UL (ref 1.7–7.7)
NEUTROPHILS RELATIVE PERCENT: 51.1 %
PDW BLD-RTO: 15.5 % (ref 12.4–15.4)
PLATELET # BLD: 296 K/UL (ref 135–450)
PMV BLD AUTO: 9.3 FL (ref 5–10.5)
POTASSIUM SERPL-SCNC: 3.2 MMOL/L (ref 3.5–5.1)
PROTHROMBIN TIME: 12.6 SEC (ref 9.9–12.7)
RBC # BLD: 3.96 M/UL (ref 4–5.2)
SODIUM BLD-SCNC: 139 MMOL/L (ref 136–145)
TOTAL PROTEIN: 8.3 G/DL (ref 6.4–8.2)
WBC # BLD: 10.3 K/UL (ref 4–11)

## 2021-09-13 PROCEDURE — 36415 COLL VENOUS BLD VENIPUNCTURE: CPT

## 2021-09-13 PROCEDURE — 80048 BASIC METABOLIC PNL TOTAL CA: CPT

## 2021-09-13 PROCEDURE — 80076 HEPATIC FUNCTION PANEL: CPT

## 2021-09-13 PROCEDURE — 85610 PROTHROMBIN TIME: CPT

## 2021-09-13 PROCEDURE — 85025 COMPLETE CBC W/AUTO DIFF WBC: CPT

## 2021-10-04 ENCOUNTER — HOSPITAL ENCOUNTER (OUTPATIENT)
Age: 66
Discharge: HOME | End: 2021-10-04
Payer: MEDICARE

## 2021-10-04 LAB
ALBUMIN SERPL-MCNC: 4 G/DL (ref 3.4–5)
ALP BLD-CCNC: 142 U/L (ref 40–129)
ALT SERPL-CCNC: 31 U/L (ref 10–40)
ANION GAP SERPL CALCULATED.3IONS-SCNC: 12 MMOL/L (ref 3–16)
AST SERPL-CCNC: 19 U/L (ref 15–37)
BASOPHILS ABSOLUTE: 0 K/UL (ref 0–0.2)
BASOPHILS RELATIVE PERCENT: 0.2 %
BILIRUB SERPL-MCNC: 0.6 MG/DL (ref 0–1)
BILIRUBIN DIRECT: <0.2 MG/DL (ref 0–0.3)
BILIRUBIN, INDIRECT: ABNORMAL MG/DL (ref 0–1)
BUN BLDV-MCNC: 21 MG/DL (ref 7–20)
CALCIUM SERPL-MCNC: 10 MG/DL (ref 8.3–10.6)
CHLORIDE BLD-SCNC: 102 MMOL/L (ref 99–110)
CO2: 29 MMOL/L (ref 21–32)
CREAT SERPL-MCNC: 0.6 MG/DL (ref 0.6–1.2)
EOSINOPHILS ABSOLUTE: 0.1 K/UL (ref 0–0.6)
EOSINOPHILS RELATIVE PERCENT: 1.1 %
GFR AFRICAN AMERICAN: >60
GFR NON-AFRICAN AMERICAN: >60
GLUCOSE BLD-MCNC: 96 MG/DL (ref 70–99)
HCT VFR BLD CALC: 36.6 % (ref 36–48)
HEMOGLOBIN: 11.9 G/DL (ref 12–16)
INR BLD: 1 (ref 0.88–1.12)
LYMPHOCYTES ABSOLUTE: 3.7 K/UL (ref 1–5.1)
LYMPHOCYTES RELATIVE PERCENT: 38.4 %
MCH RBC QN AUTO: 29.8 PG (ref 26–34)
MCHC RBC AUTO-ENTMCNC: 32.5 G/DL (ref 31–36)
MCV RBC AUTO: 91.8 FL (ref 80–100)
MONOCYTES ABSOLUTE: 0.8 K/UL (ref 0–1.3)
MONOCYTES RELATIVE PERCENT: 8.2 %
NEUTROPHILS ABSOLUTE: 5 K/UL (ref 1.7–7.7)
NEUTROPHILS RELATIVE PERCENT: 52.1 %
PDW BLD-RTO: 17 % (ref 12.4–15.4)
PLATELET # BLD: 267 K/UL (ref 135–450)
PMV BLD AUTO: 7.8 FL (ref 5–10.5)
POTASSIUM SERPL-SCNC: 3.7 MMOL/L (ref 3.5–5.1)
PROTHROMBIN TIME: 11.3 SEC (ref 9.9–12.7)
RBC # BLD: 3.98 M/UL (ref 4–5.2)
SODIUM BLD-SCNC: 143 MMOL/L (ref 136–145)
TOTAL PROTEIN: 7.6 G/DL (ref 6.4–8.2)
WBC # BLD: 9.7 K/UL (ref 4–11)

## 2021-10-04 PROCEDURE — 85025 COMPLETE CBC W/AUTO DIFF WBC: CPT

## 2021-10-04 PROCEDURE — 80076 HEPATIC FUNCTION PANEL: CPT

## 2021-10-04 PROCEDURE — 36415 COLL VENOUS BLD VENIPUNCTURE: CPT

## 2021-10-04 PROCEDURE — 85610 PROTHROMBIN TIME: CPT

## 2021-10-04 PROCEDURE — 80048 BASIC METABOLIC PNL TOTAL CA: CPT

## 2021-10-05 ENCOUNTER — HOSPITAL ENCOUNTER (OUTPATIENT)
Dept: CT IMAGING | Age: 66
Discharge: HOME | End: 2021-10-05
Payer: MEDICARE

## 2021-10-05 DIAGNOSIS — R10.9 ABDOMINAL PAIN, UNSPECIFIED ABDOMINAL LOCATION: ICD-10-CM

## 2021-10-05 PROCEDURE — 6360000004 HC RX CONTRAST MEDICATION: Performed by: INTERNAL MEDICINE

## 2021-10-05 PROCEDURE — 74177 CT ABD & PELVIS W/CONTRAST: CPT

## 2021-10-05 RX ADMIN — IOPAMIDOL 75 ML: 755 INJECTION, SOLUTION INTRAVENOUS at 18:03

## 2021-10-05 RX ADMIN — IOHEXOL 50 ML: 240 INJECTION, SOLUTION INTRATHECAL; INTRAVASCULAR; INTRAVENOUS; ORAL at 18:03

## 2021-11-03 ENCOUNTER — HOSPITAL ENCOUNTER (INPATIENT)
Age: 66
LOS: 1 days | Discharge: HOME | DRG: 247 | End: 2021-11-04
Attending: INTERNAL MEDICINE | Admitting: INTERNAL MEDICINE
Payer: MEDICARE

## 2021-11-03 ENCOUNTER — APPOINTMENT (OUTPATIENT)
Dept: GENERAL RADIOLOGY | Age: 66
DRG: 247 | End: 2021-11-03
Payer: MEDICARE

## 2021-11-03 DIAGNOSIS — R07.9 CHEST PAIN, UNSPECIFIED TYPE: Primary | ICD-10-CM

## 2021-11-03 LAB
ANION GAP SERPL CALCULATED.3IONS-SCNC: 11 MMOL/L (ref 3–16)
BASOPHILS ABSOLUTE: 0 K/UL (ref 0–0.2)
BASOPHILS RELATIVE PERCENT: 0.6 %
BUN BLDV-MCNC: 14 MG/DL (ref 7–20)
CALCIUM SERPL-MCNC: 10.1 MG/DL (ref 8.3–10.6)
CHLORIDE BLD-SCNC: 102 MMOL/L (ref 99–110)
CO2: 25 MMOL/L (ref 21–32)
CREAT SERPL-MCNC: 0.8 MG/DL (ref 0.6–1.2)
EKG ATRIAL RATE: 83 BPM
EKG DIAGNOSIS: NORMAL
EKG P AXIS: 72 DEGREES
EKG P-R INTERVAL: 130 MS
EKG Q-T INTERVAL: 364 MS
EKG QRS DURATION: 102 MS
EKG QTC CALCULATION (BAZETT): 427 MS
EKG R AXIS: -45 DEGREES
EKG T AXIS: 37 DEGREES
EKG VENTRICULAR RATE: 83 BPM
EOSINOPHILS ABSOLUTE: 0.1 K/UL (ref 0–0.6)
EOSINOPHILS RELATIVE PERCENT: 1 %
GFR AFRICAN AMERICAN: >60
GFR NON-AFRICAN AMERICAN: >60
GLUCOSE BLD-MCNC: 101 MG/DL (ref 70–99)
HCT VFR BLD CALC: 38.4 % (ref 36–48)
HEMOGLOBIN: 12.5 G/DL (ref 12–16)
LEFT VENTRICULAR EJECTION FRACTION MODE: NORMAL
LV EF: 60 %
LYMPHOCYTES ABSOLUTE: 2.7 K/UL (ref 1–5.1)
LYMPHOCYTES RELATIVE PERCENT: 36.4 %
MAGNESIUM: 1.8 MG/DL (ref 1.8–2.4)
MCH RBC QN AUTO: 29.7 PG (ref 26–34)
MCHC RBC AUTO-ENTMCNC: 32.4 G/DL (ref 31–36)
MCV RBC AUTO: 91.5 FL (ref 80–100)
MONOCYTES ABSOLUTE: 1 K/UL (ref 0–1.3)
MONOCYTES RELATIVE PERCENT: 13.9 %
NEUTROPHILS ABSOLUTE: 3.6 K/UL (ref 1.7–7.7)
NEUTROPHILS RELATIVE PERCENT: 48.1 %
PDW BLD-RTO: 15 % (ref 12.4–15.4)
PLATELET # BLD: 239 K/UL (ref 135–450)
PMV BLD AUTO: 7 FL (ref 5–10.5)
POC ACT LR: 246 SEC
POC ACT LR: 260 SEC
POC ACT LR: 269 SEC
POTASSIUM REFLEX MAGNESIUM: 3.3 MMOL/L (ref 3.5–5.1)
PRO-BNP: 49 PG/ML (ref 0–124)
RBC # BLD: 4.2 M/UL (ref 4–5.2)
SODIUM BLD-SCNC: 138 MMOL/L (ref 136–145)
TROPONIN: <0.01 NG/ML
TROPONIN: <0.01 NG/ML
WBC # BLD: 7.5 K/UL (ref 4–11)

## 2021-11-03 PROCEDURE — 2580000003 HC RX 258

## 2021-11-03 PROCEDURE — 36415 COLL VENOUS BLD VENIPUNCTURE: CPT

## 2021-11-03 PROCEDURE — 2500000003 HC RX 250 WO HCPCS

## 2021-11-03 PROCEDURE — 6370000000 HC RX 637 (ALT 250 FOR IP): Performed by: INTERNAL MEDICINE

## 2021-11-03 PROCEDURE — 6370000000 HC RX 637 (ALT 250 FOR IP): Performed by: PHYSICIAN ASSISTANT

## 2021-11-03 PROCEDURE — 93010 ELECTROCARDIOGRAM REPORT: CPT | Performed by: INTERNAL MEDICINE

## 2021-11-03 PROCEDURE — 6370000000 HC RX 637 (ALT 250 FOR IP)

## 2021-11-03 PROCEDURE — 83880 ASSAY OF NATRIURETIC PEPTIDE: CPT

## 2021-11-03 PROCEDURE — 99152 MOD SED SAME PHYS/QHP 5/>YRS: CPT

## 2021-11-03 PROCEDURE — B2111ZZ FLUOROSCOPY OF MULTIPLE CORONARY ARTERIES USING LOW OSMOLAR CONTRAST: ICD-10-PCS | Performed by: INTERNAL MEDICINE

## 2021-11-03 PROCEDURE — 6360000002 HC RX W HCPCS

## 2021-11-03 PROCEDURE — 71045 X-RAY EXAM CHEST 1 VIEW: CPT

## 2021-11-03 PROCEDURE — 83735 ASSAY OF MAGNESIUM: CPT

## 2021-11-03 PROCEDURE — 85025 COMPLETE CBC W/AUTO DIFF WBC: CPT

## 2021-11-03 PROCEDURE — C1769 GUIDE WIRE: HCPCS

## 2021-11-03 PROCEDURE — 84484 ASSAY OF TROPONIN QUANT: CPT

## 2021-11-03 PROCEDURE — 92928 PRQ TCAT PLMT NTRAC ST 1 LES: CPT | Performed by: INTERNAL MEDICINE

## 2021-11-03 PROCEDURE — G0378 HOSPITAL OBSERVATION PER HR: HCPCS

## 2021-11-03 PROCEDURE — C1725 CATH, TRANSLUMIN NON-LASER: HCPCS

## 2021-11-03 PROCEDURE — 027035Z DILATION OF CORONARY ARTERY, ONE ARTERY WITH TWO DRUG-ELUTING INTRALUMINAL DEVICES, PERCUTANEOUS APPROACH: ICD-10-PCS | Performed by: INTERNAL MEDICINE

## 2021-11-03 PROCEDURE — 85347 COAGULATION TIME ACTIVATED: CPT

## 2021-11-03 PROCEDURE — 93571 IV DOP VEL&/PRESS C FLO 1ST: CPT

## 2021-11-03 PROCEDURE — 99153 MOD SED SAME PHYS/QHP EA: CPT

## 2021-11-03 PROCEDURE — 99152 MOD SED SAME PHYS/QHP 5/>YRS: CPT | Performed by: INTERNAL MEDICINE

## 2021-11-03 PROCEDURE — C1887 CATHETER, GUIDING: HCPCS

## 2021-11-03 PROCEDURE — 93458 L HRT ARTERY/VENTRICLE ANGIO: CPT | Performed by: INTERNAL MEDICINE

## 2021-11-03 PROCEDURE — 93571 IV DOP VEL&/PRESS C FLO 1ST: CPT | Performed by: INTERNAL MEDICINE

## 2021-11-03 PROCEDURE — 4A023N7 MEASUREMENT OF CARDIAC SAMPLING AND PRESSURE, LEFT HEART, PERCUTANEOUS APPROACH: ICD-10-PCS | Performed by: INTERNAL MEDICINE

## 2021-11-03 PROCEDURE — C1894 INTRO/SHEATH, NON-LASER: HCPCS

## 2021-11-03 PROCEDURE — 99284 EMERGENCY DEPT VISIT MOD MDM: CPT

## 2021-11-03 PROCEDURE — 2140000000 HC CCU INTERMEDIATE R&B

## 2021-11-03 PROCEDURE — 93005 ELECTROCARDIOGRAM TRACING: CPT | Performed by: EMERGENCY MEDICINE

## 2021-11-03 PROCEDURE — 93458 L HRT ARTERY/VENTRICLE ANGIO: CPT

## 2021-11-03 PROCEDURE — C1874 STENT, COATED/COV W/DEL SYS: HCPCS

## 2021-11-03 PROCEDURE — C9600 PERC DRUG-EL COR STENT SING: HCPCS

## 2021-11-03 PROCEDURE — 80048 BASIC METABOLIC PNL TOTAL CA: CPT

## 2021-11-03 PROCEDURE — 6360000004 HC RX CONTRAST MEDICATION: Performed by: INTERNAL MEDICINE

## 2021-11-03 PROCEDURE — 2709999900 HC NON-CHARGEABLE SUPPLY

## 2021-11-03 PROCEDURE — 99215 OFFICE O/P EST HI 40 MIN: CPT | Performed by: INTERNAL MEDICINE

## 2021-11-03 RX ORDER — ATROPINE SULFATE 0.1 MG/ML
INJECTION INTRAVENOUS
Status: DISPENSED
Start: 2021-11-03 | End: 2021-11-04

## 2021-11-03 RX ORDER — ACETAMINOPHEN 325 MG/1
650 TABLET ORAL EVERY 6 HOURS PRN
Status: DISCONTINUED | OUTPATIENT
Start: 2021-11-03 | End: 2021-11-04 | Stop reason: HOSPADM

## 2021-11-03 RX ORDER — POLYETHYLENE GLYCOL 3350 17 G/17G
17 POWDER, FOR SOLUTION ORAL DAILY PRN
Status: DISCONTINUED | OUTPATIENT
Start: 2021-11-03 | End: 2021-11-04 | Stop reason: HOSPADM

## 2021-11-03 RX ORDER — IPRATROPIUM BROMIDE AND ALBUTEROL SULFATE 2.5; .5 MG/3ML; MG/3ML
1 SOLUTION RESPIRATORY (INHALATION) EVERY 4 HOURS PRN
Status: DISCONTINUED | OUTPATIENT
Start: 2021-11-03 | End: 2021-11-04 | Stop reason: HOSPADM

## 2021-11-03 RX ORDER — SODIUM CHLORIDE 0.9 % (FLUSH) 0.9 %
5-40 SYRINGE (ML) INJECTION EVERY 12 HOURS SCHEDULED
Status: DISCONTINUED | OUTPATIENT
Start: 2021-11-03 | End: 2021-11-04 | Stop reason: HOSPADM

## 2021-11-03 RX ORDER — HYDROCODONE BITARTRATE AND ACETAMINOPHEN 10; 325 MG/1; MG/1
1 TABLET ORAL EVERY 6 HOURS PRN
Status: DISCONTINUED | OUTPATIENT
Start: 2021-11-03 | End: 2021-11-04 | Stop reason: HOSPADM

## 2021-11-03 RX ORDER — HYDROCODONE BITARTRATE AND ACETAMINOPHEN 10; 300 MG/1; MG/1
10 TABLET ORAL 4 TIMES DAILY PRN
COMMUNITY

## 2021-11-03 RX ORDER — PANTOPRAZOLE SODIUM 40 MG/1
40 TABLET, DELAYED RELEASE ORAL
Status: DISCONTINUED | OUTPATIENT
Start: 2021-11-03 | End: 2021-11-04 | Stop reason: HOSPADM

## 2021-11-03 RX ORDER — ASPIRIN 81 MG/1
81 TABLET, CHEWABLE ORAL DAILY
Status: DISCONTINUED | OUTPATIENT
Start: 2021-11-03 | End: 2021-11-04 | Stop reason: HOSPADM

## 2021-11-03 RX ORDER — SODIUM CHLORIDE 0.9 % (FLUSH) 0.9 %
5-40 SYRINGE (ML) INJECTION PRN
Status: DISCONTINUED | OUTPATIENT
Start: 2021-11-03 | End: 2021-11-04 | Stop reason: HOSPADM

## 2021-11-03 RX ORDER — FOLIC ACID 1 MG/1
1 TABLET ORAL DAILY
Status: DISCONTINUED | OUTPATIENT
Start: 2021-11-03 | End: 2021-11-04 | Stop reason: HOSPADM

## 2021-11-03 RX ORDER — SODIUM CHLORIDE 9 MG/ML
25 INJECTION, SOLUTION INTRAVENOUS PRN
Status: DISCONTINUED | OUTPATIENT
Start: 2021-11-03 | End: 2021-11-04 | Stop reason: HOSPADM

## 2021-11-03 RX ORDER — GAUZE BANDAGE 2" X 2"
100 BANDAGE TOPICAL DAILY
Status: DISCONTINUED | OUTPATIENT
Start: 2021-11-03 | End: 2021-11-03

## 2021-11-03 RX ORDER — POTASSIUM CHLORIDE 20 MEQ/1
40 TABLET, EXTENDED RELEASE ORAL ONCE
Status: COMPLETED | OUTPATIENT
Start: 2021-11-03 | End: 2021-11-03

## 2021-11-03 RX ORDER — HYDROCHLOROTHIAZIDE 25 MG/1
25 TABLET ORAL DAILY
Status: DISCONTINUED | OUTPATIENT
Start: 2021-11-03 | End: 2021-11-04

## 2021-11-03 RX ORDER — GAUZE BANDAGE 2" X 2"
100 BANDAGE TOPICAL DAILY
Status: DISCONTINUED | OUTPATIENT
Start: 2021-11-03 | End: 2021-11-04 | Stop reason: HOSPADM

## 2021-11-03 RX ORDER — CLOPIDOGREL BISULFATE 75 MG/1
75 TABLET ORAL DAILY
Status: DISCONTINUED | OUTPATIENT
Start: 2021-11-04 | End: 2021-11-04 | Stop reason: HOSPADM

## 2021-11-03 RX ORDER — BUDESONIDE AND FORMOTEROL FUMARATE DIHYDRATE 160; 4.5 UG/1; UG/1
1 AEROSOL RESPIRATORY (INHALATION) DAILY
Status: DISCONTINUED | OUTPATIENT
Start: 2021-11-03 | End: 2021-11-04 | Stop reason: HOSPADM

## 2021-11-03 RX ORDER — ASPIRIN 81 MG/1
324 TABLET, CHEWABLE ORAL ONCE
Status: COMPLETED | OUTPATIENT
Start: 2021-11-03 | End: 2021-11-03

## 2021-11-03 RX ORDER — ALBUTEROL SULFATE 90 UG/1
2 AEROSOL, METERED RESPIRATORY (INHALATION) EVERY 6 HOURS PRN
Status: DISCONTINUED | OUTPATIENT
Start: 2021-11-03 | End: 2021-11-04 | Stop reason: HOSPADM

## 2021-11-03 RX ORDER — SODIUM CHLORIDE 0.9 % (FLUSH) 0.9 %
5-40 SYRINGE (ML) INJECTION PRN
Status: DISCONTINUED | OUTPATIENT
Start: 2021-11-03 | End: 2021-11-03 | Stop reason: SDUPTHER

## 2021-11-03 RX ORDER — IPRATROPIUM BROMIDE AND ALBUTEROL SULFATE 2.5; .5 MG/3ML; MG/3ML
1 SOLUTION RESPIRATORY (INHALATION) EVERY 4 HOURS
Status: DISCONTINUED | OUTPATIENT
Start: 2021-11-03 | End: 2021-11-03

## 2021-11-03 RX ORDER — ONDANSETRON 2 MG/ML
4 INJECTION INTRAMUSCULAR; INTRAVENOUS EVERY 6 HOURS PRN
Status: DISCONTINUED | OUTPATIENT
Start: 2021-11-03 | End: 2021-11-04 | Stop reason: HOSPADM

## 2021-11-03 RX ORDER — GABAPENTIN 300 MG/1
300 CAPSULE ORAL 3 TIMES DAILY
Status: DISCONTINUED | OUTPATIENT
Start: 2021-11-03 | End: 2021-11-04 | Stop reason: HOSPADM

## 2021-11-03 RX ORDER — ACETAMINOPHEN 650 MG/1
650 SUPPOSITORY RECTAL EVERY 6 HOURS PRN
Status: DISCONTINUED | OUTPATIENT
Start: 2021-11-03 | End: 2021-11-04 | Stop reason: HOSPADM

## 2021-11-03 RX ORDER — LOSARTAN POTASSIUM 100 MG/1
100 TABLET ORAL DAILY
Status: DISCONTINUED | OUTPATIENT
Start: 2021-11-03 | End: 2021-11-04

## 2021-11-03 RX ORDER — ONDANSETRON 4 MG/1
4 TABLET, ORALLY DISINTEGRATING ORAL EVERY 8 HOURS PRN
Status: DISCONTINUED | OUTPATIENT
Start: 2021-11-03 | End: 2021-11-04 | Stop reason: HOSPADM

## 2021-11-03 RX ORDER — ATORVASTATIN CALCIUM 80 MG/1
80 TABLET, FILM COATED ORAL NIGHTLY
Status: DISCONTINUED | OUTPATIENT
Start: 2021-11-03 | End: 2021-11-04 | Stop reason: HOSPADM

## 2021-11-03 RX ADMIN — HYDROCODONE BITARTRATE AND ACETAMINOPHEN 1 TABLET: 10; 325 TABLET ORAL at 14:43

## 2021-11-03 RX ADMIN — IOPAMIDOL 75 ML: 755 INJECTION, SOLUTION INTRAVENOUS at 16:21

## 2021-11-03 RX ADMIN — FOLIC ACID 1 MG: 1 TABLET ORAL at 14:44

## 2021-11-03 RX ADMIN — HYDROCHLOROTHIAZIDE 25 MG: 25 TABLET ORAL at 14:43

## 2021-11-03 RX ADMIN — LOSARTAN POTASSIUM 100 MG: 100 TABLET, FILM COATED ORAL at 14:44

## 2021-11-03 RX ADMIN — ATORVASTATIN CALCIUM 80 MG: 80 TABLET, FILM COATED ORAL at 22:57

## 2021-11-03 RX ADMIN — POTASSIUM CHLORIDE 40 MEQ: 20 TABLET, EXTENDED RELEASE ORAL at 13:01

## 2021-11-03 RX ADMIN — HYDROCODONE BITARTRATE AND ACETAMINOPHEN 1 TABLET: 10; 325 TABLET ORAL at 21:48

## 2021-11-03 RX ADMIN — GABAPENTIN 300 MG: 300 CAPSULE ORAL at 19:26

## 2021-11-03 RX ADMIN — ASPIRIN 81 MG 324 MG: 81 TABLET ORAL at 10:24

## 2021-11-03 RX ADMIN — GABAPENTIN 300 MG: 300 CAPSULE ORAL at 14:43

## 2021-11-03 RX ADMIN — METOPROLOL TARTRATE 12.5 MG: 25 TABLET, FILM COATED ORAL at 14:43

## 2021-11-03 ASSESSMENT — PAIN SCALES - GENERAL
PAINLEVEL_OUTOF10: 3
PAINLEVEL_OUTOF10: 4
PAINLEVEL_OUTOF10: 3
PAINLEVEL_OUTOF10: 0

## 2021-11-03 ASSESSMENT — ENCOUNTER SYMPTOMS
VOMITING: 0
SHORTNESS OF BREATH: 1
ABDOMINAL PAIN: 0
COLOR CHANGE: 0
DIARRHEA: 0
BACK PAIN: 0
CONSTIPATION: 0
NAUSEA: 1

## 2021-11-03 ASSESSMENT — HEART SCORE: ECG: 1

## 2021-11-03 NOTE — H&P
HOSPITALISTS HISTORY AND PHYSICAL    11/3/2021 12:26 PM    Patient Information:  Nilson Haddad is a 78 y.o. female [id number]  PCP:  Angelica Dumont MD (Tel: [phone] )    Chief complaint:    Chief Complaint   Patient presents with    Chest Pain     Presents with c/o CP for 2-3 days, history of MI     History of Present Illness:  Ludwin Patel is a 78 y.o. female the last 2 to 3 days has had 1 to 2 minutes of midsternal 8 out of 10 pressure-like chest pain that radiates to the left arm and left jaw associated with palpitations some nausea and some shortness of breath. Pain goes away by itself. Pain is mostly when she is sitting does not get worse with exertion. Patient had a MI in 2016 had stents placed negative stress test in 2020. Patient does not smoke or drink no secondhand smoke exposure          REVIEW OF SYSTEMS:   Constitutional: Negative for fever,chills or night sweats  ENT: Negative for rhinorrhea, epistaxis, hoarseness, sore throat. Respiratory: see above  Cardiovascular: see above  Gastrointestinal: see above  Genitourinary: Negative for polyuria, dysuria   Hematologic/Lymphatic: Negative for bleeding tendency, easy bruising  Musculoskeletal: Negative for myalgias and arthralgias  Neurologic: Negative for confusion,dysarthria. Skin: Negative for itching,rash  Psychiatric: Negative for depression,anxiety, agitation. Endocrine: Negative for polydipsia,polyuria,heat /cold intolerance. Past Medical History:   has a past medical history of ETOH abuse, HTN (hypertension), Pancreatitis, and Shingles. Past Surgical History:   has a past surgical history that includes Abdomen surgery and US BIOPSY LIVER PERCUTANEOUS (7/2/2020). Medications:  No current facility-administered medications on file prior to encounter.      Current Outpatient Medications on File Prior to Encounter   Medication Sig Dispense Refill    fluticasone-umeclidin-vilant (TRELEGY ELLIPTA) 100-62.5-25 MCG/INH AEPB Inhale 1 puff into the lungs daily 1 each 6    fluticasone-umeclidin-vilant (TRELEGY ELLIPTA) 100-62.5-25 MCG/INH AEPB Inhale 1 puff into the lungs daily 3 each 3    metoprolol tartrate (LOPRESSOR) 25 MG tablet Take 0.5 tablets by mouth 2 times daily 60 tablet 3    folic acid (FOLVITE) 1 MG tablet Take 1 tablet by mouth daily 30 tablet 3    pantoprazole (PROTONIX) 40 MG tablet Take 1 tablet by mouth 2 times daily (before meals) 30 tablet 3    vitamin B-1 100 MG tablet Take 1 tablet by mouth daily 30 tablet 3    albuterol sulfate HFA (PROAIR HFA) 108 (90 Base) MCG/ACT inhaler Inhale 2 puffs into the lungs every 6 hours as needed for Wheezing      aspirin 81 MG chewable tablet Take 81 mg by mouth daily      atorvastatin (LIPITOR) 80 MG tablet Take 80 mg by mouth daily      gabapentin (NEURONTIN) 300 MG capsule Take 300 mg by mouth 3 times daily.  hydroCHLOROthiazide (HYDRODIURIL) 25 MG tablet Take 25 mg by mouth daily      ipratropium-albuterol (DUONEB) 0.5-2.5 (3) MG/3ML SOLN nebulizer solution Inhale 1 vial into the lungs every 4 hours      losartan (COZAAR) 100 MG tablet Take 100 mg by mouth daily         Allergies: Allergies   Allergen Reactions    Lisinopril Swelling    Dilaudid [Hydromorphone Hcl]      itching        Social History:  Patient Lives at home   reports that she quit smoking about 5 years ago. She has a 30.00 pack-year smoking history. She has never used smokeless tobacco. She reports previous alcohol use. She reports that she does not use drugs. Family History:  family history includes Diabetes in her sister. ,     Physical Exam:  /76   Pulse 68   Temp 98.8 °F (37.1 °C) (Oral)   Resp 17   Wt 167 lb (75.8 kg)   SpO2 94%   BMI 26.95 kg/m²     General appearance:  Appears comfortable.  AAOx3  HEENT: atraumatic, Pupils equal, muscous membranes moist, no masses appreciated  Cardiovascular: Regular rate and rhythm no murmurs appreciated  Respiratory: CTAB no wheezing  Gastrointestinal: Abdomen soft, non-tender, BS+  EXT: no edema  Neurology: no gross focal deficts  Psychiatry: Appropriate affect. Not agitated  Skin: Warm, dry, no rashes appreciated    Labs:  CBC:   Lab Results   Component Value Date    WBC 7.5 11/03/2021    RBC 4.20 11/03/2021    HGB 12.5 11/03/2021    HCT 38.4 11/03/2021    MCV 91.5 11/03/2021    MCH 29.7 11/03/2021    MCHC 32.4 11/03/2021    RDW 15.0 11/03/2021     11/03/2021    MPV 7.0 11/03/2021     BMP:    Lab Results   Component Value Date     11/03/2021    K 3.3 11/03/2021     11/03/2021    CO2 25 11/03/2021    BUN 14 11/03/2021    CREATININE 0.8 11/03/2021    CALCIUM 10.1 11/03/2021    GFRAA >60 11/03/2021    LABGLOM >60 11/03/2021    GLUCOSE 101 11/03/2021     XR CHEST PORTABLE   Final Result   No radiographic evidence of acute pulmonary disease. Recent imaging reviewed    Problem List  Active Problems:    Chest pain  Resolved Problems:    * No resolved hospital problems. *        Assessment/Plan:   Chest pain  - repeat troponin, given recent negative stress will get cards input regarding repeat stress or cath will discuss    CAD: home meds stents 2016    Htn: home meds    Chronic copd home meds    Hypokalemia: replace and recheck      DVT prophylaxis lovenox  Code status full code            Please note that some part of this chart was generated using Dragon dictation software. Although every effort was made to ensure the accuracy of this automated transcription, some errors in transcription may have occurred inadvertently. If you may need any clarification, please do not hesitate to contact me through [facility].        Tj Mchugh MD    11/3/2021 12:26 PM

## 2021-11-03 NOTE — PLAN OF CARE
Problem: Pain:  Goal: Pain level will decrease  Description: Pain level will decrease  Outcome: Ongoing  Goal: Control of acute pain  Description: Control of acute pain  Outcome: Ongoing  Goal: Control of chronic pain  Description: Control of chronic pain  Outcome: Ongoing     Problem: SAFETY  Goal: Free from accidental physical injury  Outcome: Ongoing  Goal: Free from intentional harm  Outcome: Ongoing     Problem: DAILY CARE  Goal: Daily care needs are met  Outcome: Ongoing     Problem: PAIN  Goal: Patient's pain/discomfort is manageable  Outcome: Ongoing     Problem: SKIN INTEGRITY  Goal: Skin integrity is maintained or improved  Outcome: Ongoing     Problem: KNOWLEDGE DEFICIT  Goal: Patient/S.O. demonstrates understanding of disease process, treatment plan, medications, and discharge instructions.   Outcome: Ongoing     Problem: DISCHARGE BARRIERS  Goal: Patient's continuum of care needs are met  Outcome: Ongoing     Problem: Cardiac:  Goal: Ability to maintain an adequate cardiac output will improve  Description: Ability to maintain an adequate cardiac output will improve  Outcome: Ongoing  Goal: Complications related to the disease process, condition or treatment will be avoided or minimized  Description: Complications related to the disease process, condition or treatment will be avoided or minimized  Outcome: Ongoing  Goal: Hemodynamic stability will improve  Description: Hemodynamic stability will improve  Outcome: Ongoing  Goal: Risk factors for ineffective tissue perfusion will decrease  Description: Risk factors for ineffective tissue perfusion will decrease  Outcome: Ongoing     Problem: Fluid Volume:  Goal: Will show no signs or symptoms of fluid imbalance  Description: Will show no signs or symptoms of fluid imbalance  Outcome: Ongoing

## 2021-11-03 NOTE — ED NOTES
Bed: 09  Expected date:   Expected time:   Means of arrival:   Comments:   Santos Hardin RN  11/03/21 1023

## 2021-11-03 NOTE — ED PROVIDER NOTES
**EVALUATED BY LUAN**    [address] d[facility]MENT eNCOUnter    Pt Name: Cyndee Chavez  MRN: [id number]  [place]flyndon 1955  Date of evaluation: 11/3/2021  Provider: ARTIS Yin    Chief Complaint:    Chief Complaint   Patient presents with    Chest Pain     Presents with c/o CP for 2-3 days, history of MI       Nursing Notes, Past Medical Hx, Past Surgical Hx, Social Hx, Allergies, and Family Hx were all reviewed and agreedwith or any disagreements were addressed in the HPI.    HPI:  (Location, Duration, Timing, Severity, Quality, Assoc Sx, Context, Modifying factors)  This is a  78 y.o. female who presents to the emergency department with complaints of intermittent chest pain ongoing for the past 2 to 3 days. Patient's pain is both pressure and sharp in nature, she reports associated nausea and may be some mild diaphoresis when these episodes occur. She denies any aggravating or alleviating factors. She denies this being exertional.  She states that the pain does radiate into the left side of her neck and into her left arm. These episodes last for approximately 1 minute at a time. At current time she has no pain. Last episode of chest pain just prior to arrival in emergency department. She states that over the past few days she is also felt like her heart was beating out of her chest.  She has had previous MI but states that this does feel different. Stents were placed in 2016. She does have family history of cardiac disease, previous smoker, quit 6 years ago, history of hypertension and hyperlipidemia as well as CAD. Denies any associated numbness, tingling or weakness. Pain does not radiate into the back. Does not feel short of breath. All other systems were reviewed and are negative.      Past Medical/Surgical History:      Diagnosis Date    ETOH abuse     HTN (hypertension)     Pancreatitis     Shingles          Procedure Laterality Date    ABDOMEN SURGERY      US GUIDED LIVER BIOPSY PERCUTANEOUS  7/2/2020    US GUIDED LIVER BIOPSY PERCUTANEOUS 7/2/2020 [facility] ULTRASOUND       Medications:  Previous Medications    ALBUTEROL SULFATE HFA (PROAIR HFA) 108 (90 BASE) MCG/ACT INHALER    Inhale 2 puffs into the lungs every 6 hours as needed for Wheezing    ASPIRIN 81 MG CHEWABLE TABLET    Take 81 mg by mouth daily    ATORVASTATIN (LIPITOR) 80 MG TABLET    Take 80 mg by mouth daily    FLUTICASONE-UMECLIDIN-VILANT (TRELEGY ELLIPTA) 100-62.5-25 MCG/INH AEPB    Inhale 1 puff into the lungs daily    FLUTICASONE-UMECLIDIN-VILANT (TRELEGY ELLIPTA) 100-62.5-25 MCG/INH AEPB    Inhale 1 puff into the lungs daily    FOLIC ACID (FOLVITE) 1 MG TABLET    Take 1 tablet by mouth daily    GABAPENTIN (NEURONTIN) 300 MG CAPSULE    Take 300 mg by mouth 3 times daily. HYDROCHLOROTHIAZIDE (HYDRODIURIL) 25 MG TABLET    Take 25 mg by mouth daily    IPRATROPIUM-ALBUTEROL (DUONEB) 0.5-2.5 (3) MG/3ML SOLN NEBULIZER SOLUTION    Inhale 1 vial into the lungs every 4 hours    LOSARTAN (COZAAR) 100 MG TABLET    Take 100 mg by mouth daily    METOPROLOL TARTRATE (LOPRESSOR) 25 MG TABLET    Take 0.5 tablets by mouth 2 times daily    PANTOPRAZOLE (PROTONIX) 40 MG TABLET    Take 1 tablet by mouth 2 times daily (before meals)    VITAMIN B-1 100 MG TABLET    Take 1 tablet by mouth daily     Review of Systems:  Review of Systems   Constitutional: Positive for diaphoresis. Negative for chills, fatigue and fever. Eyes: Negative for visual disturbance. Respiratory: Positive for shortness of breath (chronic, unchanged). Cardiovascular: Positive for chest pain. Negative for palpitations and leg swelling. Gastrointestinal: Positive for nausea. Negative for abdominal pain, constipation, diarrhea and vomiting. Musculoskeletal: Positive for arthralgias and neck pain. Negative for back pain. Skin: Negative for color change and rash.    Neurological: Negative for dizziness, weakness, light-headedness, numbness and headaches. All other systems reviewed and are negative. Positives and Pertinent negatives as per HPI. Except as noted above in the ROS, all other systems were reviewed/completed and are negative. Physical Exam:  Physical Exam  Vitals and nursing note reviewed. Constitutional:       Appearance: Normal appearance. She is well-developed. She is not toxic-appearing or diaphoretic. HENT:      Head: Normocephalic. Right Ear: External ear normal.      Left Ear: External ear normal.      Nose: Nose normal.   Eyes:      General:         Right eye: No discharge. Left eye: No discharge. Conjunctiva/sclera: Conjunctivae normal.   Cardiovascular:      Rate and Rhythm: Normal rate and regular rhythm. Pulses:           Radial pulses are 2+ on the right side and 2+ on the left side. Dorsalis pedis pulses are 2+ on the right side and 2+ on the left side. Posterior tibial pulses are 2+ on the right side and 2+ on the left side. Heart sounds: Normal heart sounds. No murmur heard. No friction rub. No gallop. Pulmonary:      Effort: Pulmonary effort is normal. No respiratory distress. Breath sounds: Normal breath sounds. No wheezing or rales. Abdominal:      General: Abdomen is flat. Bowel sounds are normal. There is no distension. Palpations: Abdomen is soft. There is no mass. Tenderness: There is no abdominal tenderness. There is no guarding. Musculoskeletal:         General: Normal range of motion. Cervical back: Normal range of motion and neck supple. Skin:     General: Skin is warm and dry. Coloration: Skin is not pale. Neurological:      Mental Status: She is alert and oriented to person, place, and time. Psychiatric:         Behavior: Behavior normal. Behavior is cooperative.          MEDICAL DECISION MAKING    Vitals:    Vitals:    11/03/21 0957 11/03/21 1115 11/03/21 1130 11/03/21 1145   BP: 125/84 129/83 129/76 124/76   Pulse: 81 74 68 68   Resp: 16 11 12 17   Temp: 98.8 °F (37.1 °C)      TempSrc: Oral      SpO2: 99% 94% 93% 94%   Weight: 167 lb (75.8 kg)          LABS:   Labs Reviewed   BASIC METABOLIC PANEL W/ REFLEX TO MG FOR LOW K - Abnormal; Notable for the following components:       Result Value    Potassium reflex Magnesium 3.3 (*)     Glucose 101 (*)     All other components within normal limits    Narrative:     Performed at:  [facility] [address], [facility], [address]   Phone [phone]   CBC WITH AUTO DIFFERENTIAL    Narrative:     Performed at:  [facility] [address], [facility], [facility] [address]   Phone [phone]   TROPONIN    Narrative:     Performed at:  [facility] [address], [facility], [address]   Phone [phone]     Narrative:     Performed at:  [facility] [address], [facility], [address]   Phone [phone]   MAGNESIUM    Narrative:     Performed at:  [facility] [address], [facility], [address]   Phone [phone] of labs reviewed and were negative at this time or not returned at the time of this note. RADIOLOGY:   Non-plain film images suchas CT, Ultrasound and MRI are read by the radiologist. Tyesha GONGORA PA have directly visualized the radiologic plain film image(s) with the below findings:  Interpretation per the Radiologist below, if available at the time of this note:    XR CHEST PORTABLE   Final Result   No radiographic evidence of acute pulmonary disease.            MEDICAL DECISION MAKING / ED COURSE:      PROCEDURES:   Procedures    Patient was given:  Medications   potassium chloride (KLOR-CON M) extended release tablet 40 mEq (has no administration in time range)   aspirin chewable tablet 324 mg (324 mg Oral Given 11/3/21 1024) Patient presents to the emergency department with intermittent chest pain ongoing for 2 to 3 days. Symptoms last for approximately 1 minute in duration. Story is concerning for cardiac etiology. Initial troponin is normal, chest x-ray shows no evidence of cardiopulmonary disease. EKG is read by attending physician shows no acute changes from previous imaging or signs of acute MI. Patient does have an high heart score of 7. Heart Score  Heart Score for chest pain patients  History: Highly Suspicious  ECG: Non-Specifc repolarization disturbance/LBTB/PM  Patient Age: > 65 years  *Risk factors for Atherosclerotic disease: Hypercholesterolemia, Hypertension, Coronary Artery Disease, Positive family History  Risk Factors: > 3 Risk factors or history of atherosclerotic disease*  Troponin: < 1X normal limit  Heart Score Total: 7    We will discuss with hospitalist requesting admission for further care and management. Patient did receive a 324 mg chewable aspirin on arrival.  Patient remained stable throughout ED course. The patient tolerated their visit well. I have evaluated the patient with physician available for consultation as needed. I have discussed the findings of today's workup with the patient and addressed the patient's questions and concerns. CLINICAL IMPRESSION:  1. Chest pain, unspecified type      DISPOSITION  ADMIT    PATIENT REFERRED TO:  No follow-up provider specified.     DISCHARGE MEDICATIONS:  New Prescriptions    No medications on file              (Please note the MDM and HPI sections of this note were completed with avoice recognition program.  Efforts were made to edit the dictations but occasionally words are mis-transcribed.)    Electronically signed, Kasey Goodpasture, PA,            ARTIS Goncalves  11/03/21 2092

## 2021-11-03 NOTE — RT PROTOCOL NOTE
RT Inhaler-Nebulizer Bronchodilator Protocol Note    There is a bronchodilator order in the chart from a provider indicating to follow the RT Bronchodilator Protocol and there is an Initiate RT Inhaler-Nebulizer Bronchodilator Protocol order as well (see protocol at bottom of note). CXR Findings:  XR CHEST PORTABLE    Result Date: 11/3/2021  No radiographic evidence of acute pulmonary disease. The findings from the last RT Protocol Assessment were as follows:   History Pulmonary Disease: Chronic pulmonary disease  Respiratory Pattern: Regular pattern and RR 12-20 bpm  Breath Sounds: Clear breath sounds  Cough: Strong, spontaneous, non-productive  Indication for Bronchodilator Therapy: None  Bronchodilator Assessment Score: 2    Aerosolized bronchodilator medication orders have been revised according to the RT Inhaler-Nebulizer Bronchodilator Protocol below. Respiratory Therapist to perform RT Therapy Protocol Assessment initially then follow the protocol. Repeat RT Therapy Protocol Assessment PRN for score 0-3 or on second treatment, BID, and PRN for scores above 3. No Indications - adjust the frequency to every 6 hours PRN wheezing or bronchospasm, if no treatments needed after 48 hours then discontinue using Per Protocol order mode. If indication present, adjust the RT bronchodilator orders based on the Bronchodilator Assessment Score as indicated below. Use Inhaler orders unless patient has one or more of the following: on home nebulizer, not able to hold breath for 10 seconds, is not alert and oriented, cannot activate and use MDI correctly, or respiratory rate 25 breaths per minute or more, then use the equivalent nebulizer order(s) with same Frequency and PRN reasons based on the score. If a patient is on this medication at home then do not decrease Frequency below that used at home.     0-3 - enter or revise RT bronchodilator order(s) to equivalent RT Bronchodilator order with Frequency of every 4 hours PRN for wheezing or increased work of breathing using Per Protocol order mode. 4-6 - enter or revise RT Bronchodilator order(s) to two equivalent RT bronchodilator orders with one order with BID Frequency and one order with Frequency of every 4 hours PRN wheezing or increased work of breathing using Per Protocol order mode. 7-10 - enter or revise RT Bronchodilator order(s) to two equivalent RT bronchodilator orders with one order with TID Frequency and one order with Frequency of every 4 hours PRN wheezing or increased work of breathing using Per Protocol order mode. 11-13 - enter or revise RT Bronchodilator order(s) to one equivalent RT bronchodilator order with QID Frequency and an Albuterol order with Frequency of every 4 hours PRN wheezing or increased work of breathing using Per Protocol order mode. Greater than 13 - enter or revise RT Bronchodilator order(s) to one equivalent RT bronchodilator order with every 4 hours Frequency and an Albuterol order with Frequency of every 2 hours PRN wheezing or increased work of breathing using Per Protocol order mode. RT to enter RT Home Evaluation for COPD & MDI Assessment order using Per Protocol order mode.     Electronically signed by Basim Charlton RCP on 11/3/2021 at 4:05 PM

## 2021-11-03 NOTE — PROGRESS NOTES
Consult dictated    Rec- cardiac cath for suspected acute coronary syndrome based on symptoms with known CAD

## 2021-11-03 NOTE — ED NOTES
Bedside report given to 3A RN.  Patient taken to room via wheelchair at this time;      Mary Weinberg RN  11/03/21 9787

## 2021-11-03 NOTE — ED PROVIDER NOTES
EKG NOTE:    Sinus rhythm at a rate of 83 beats a minute with no acute ST elevations or depressions or pathologic Q waves. No changes from previous EKGs. I was not involved with the care of this patient.         Darion Cavazos MD  11/03/21 [id number]

## 2021-11-03 NOTE — CONSULTS
[facility] [address]                     [facility], [address]                                  CONSULTATION    PATIENT NAME: Makayal Chung                   :        1955  MED REC NO:   [id number]                          ROOM:       6385  ACCOUNT NO:   [de-identified]                           ADMIT DATE: 2021  PROVIDER:     Maximus Carlos MD    CONSULT DATE:  2021    REASON FOR CONSULTATION:  Requested by the hospitalist service to see  for chest discomfort. HISTORY OF PRESENT ILLNESS:  The patient is a 30-year-old lady. She is  a nonsmoker. She has had a previous use of excess alcohol use, but  denies alcohol currently. She has been experiencing on a daily basis  episodes of chest pressure which starts this pain then becomes pressure  last 15-30 minutes at that time, comes on a daily basis, sometimes with  exertion, more often notes started by some rapid palpitations and she  starts feeling better over time. She remains compliant with her  medications. She also has a history of chronic obstructive pulmonary  disease. Her medications are Trelegy inhaler, Lopressor 41.2 mg b.i.d.,  folic acid daily, Protonix 40 mg b.i.d., vitamin B1 daily, prednisone  which is attempting to be tapered, aspirin, Lipitor 80 mg daily,  Neurontin 300 mg t.i.d., HydroDIURIL, Cozaar 100 mg daily. PAST MEDICAL HISTORY:  Notable for presenting with an inferolateral  myocardial infarction approximately . At that time, she had  marginal stenting performed, but was also found to have significant  right coronary artery disease. She since has had a abnormal Myoview  stress test in . She has a past history of alcohol abuse, this led  to pancreatitis. She has a history of hypertension. ALLERGIES:  Listed as LISINOPRIL and DILAUDID, but she is on losartan. 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   She  actually had COVID pneumonia early on, now has had COVID vaccine. FAMILY HISTORY:  Negative for premature coronary artery disease. REVIEW OF SYSTEMS:  14-system review of systems is notable for some  lower abdominal discomfort. She saw Urology earlier this morning and  apparently is having some sort of workup for that. PHYSICAL EXAMINATION:  GENERAL:  Currently on physical examination, she appears comfortable at  rest although she had brief chest discomfort while I was talking to her. VITAL SIGNS:  Current blood pressure 130/70, pulse 60 and regular,  afebrile. HEENT:  Sclerae are clear. Posterior pharynx clear. NECK:  Neck is supple. There are no carotid bruits. LUNGS:  Lung fields are clear. HEART:  Cardiac sounds are normal.  No murmur heard. ABDOMEN:  Nontender to palpation. No masses felt. EXTREMITIES:  Without edema with good peripheral pulses. SKIN:  Warm and dry. NEUROLOGIC:  Grossly intact with her moving extremities well. Cranial  II through XII grossly intact. DIAGNOSTIC DATA:  EKG shows normal sinus rhythm, nonspecific ST-T  changes in the anterior precordial leads. She has had similar EKG seen  previously from 12/2020. LABORATORY DATA:  Lab work shows troponin 0.01. ProBNP 49. Liver tests  recently are normal.  Potassium low at 3.3, creatinine 0.8. Hemoglobin  12.5, hematocrit 38.4. IMPRESSION:  This patient gives good story for recurrent unstable  angina. In view of her cardiac history, I feel it is best to proceed  with coronary angiography, have reviewed the findings with  interventional cardiology who agree with proceeding with cardiac  catheterization. The patient understands risks, benefits, options, and  agrees to proceed. We will plan on cardiac catheterization for later on  today. Please note 70 minutes of time spent with majority of the time with  direct patient contact and performing this consultation.         Rafa Zhang MD    D: 11/03/2021 12:37:50       T: 11/03/2021 12:42:29 [email or web address]  Job#: [id number]     Doc#: [id number]    CC:

## 2021-11-03 NOTE — PROGRESS NOTES
11/03/21 1604   RT Protocol   History Pulmonary Disease 2   Respiratory pattern 0   Breath sounds 0   Cough 0   Indications for Bronchodilator Therapy None   Bronchodilator Assessment Score 2

## 2021-11-03 NOTE — PRE SEDATION
Brief Pre-Op Note/Sedation Assessment      Antonia Montes  1955  [id number]  12:19 PM    Planned Procedure: Cardiac Catheterization Procedure  Post Procedure Plan: Return to same level of care  Consent: I have discussed with the patient and/or the patient representative the indication, alternatives, and the possible risks and/or complications of the planned procedure and the anesthesia methods. The patient and/or patient representative appear to understand and agree to proceed. Chief Complaint:   Chest Pain/Pressure  Anginal Equivalent  Dyspnea on Exertion  Dyspnea  Fatigue    Indications for Cath Procedure:   Presentation:  New Onset Angina <= 2 months and Worsening Angina   Anginal Classification within 2 weeks:  CCS III - Symptoms with everyday living activities, i.e., moderate limitation   Angina Symptoms Assessment:  Typical Chest Pain   Heart Failure Class within last 2 weeks:  No symptoms   Cardiovascular Instability:  No    Prior Ischemic Workup/Eval:   Pre-Procedural Medications: Yes: Aspirin, Beta Blockers and STATIN   Stress Test Completed? No    Does Patient need surgery?  Cath Valve Surgery:  No    Pre-Procedure Medical History:   Vital Signs:  /76   Pulse 68   Temp 98.8 °F (37.1 °C) (Oral)   Resp 17   Wt 167 lb (75.8 kg)   SpO2 94%   BMI 26.95 kg/m²    Allergies:  Reviewed in EMR   Medications:  Reviewed in EMR   Past Medical History:  Reviewed in EMR   Surgical History:  Reviewed in EMR    Pre-Sedation:   Pre-Sedation Documentation and Exam = I have assessed the patient and reviewed the H&P on the chart.    Prior History of Anesthesia Complications = none   Modified Mallampati = II (soft palate, uvula, fauces visible)   ASA Classification = Class 2 - A normal healthy patient with mild systemic disease    Adnre Scale:   Activity:  2 - Able to move 4 extremities voluntarily on command   Respiration:  2 - Able to breathe deeply and cough freely   Circulation: 2 - BP+/- 20mmHg of normal   Consciousness:  2 - Fully awake   Oxygen Saturation (color):  2 - Able to maintain oxygen saturation >92% on room air    Sedation/Anesthesia Plan:  Guard the patient's safety and welfare. Minimize physical discomfort and pain. Minimize negative psychological responses to treatment by providing sedation and analgesia and maximize the potential amnesia. Patient to meet pre-procedure discharge plan.     Medication Planned:  Midazolam intravenously and fentanyl intravenously    Patient is an appropriate candidate for plan of sedation:   Yes      Electronically signed by Guido Owusu MD on 11/3/2021 at 12:19 PM

## 2021-11-03 NOTE — FLOWSHEET NOTE
Pt does not have IV access d/t poor vasculature. Came up from cath lab without access other than rt groin venous sheath which has Aggrastat infusing through. Call placed to [facility] to place PICC and they asked for call back number and stated that they would come when able.

## 2021-11-03 NOTE — ACP (ADVANCE CARE PLANNING)
Advanced Care Planning Note.     Purpose of Encounter: Advanced care planning in light of hospitalization  Parties In Attendance: Patient,    Decisional Capacity: Yes  Subjective: Patient  understand that this conversation is to address long term care goal  Objective: Admitted to hospital with chest pain history of coronary artery disease in 2016 with stents along with COPD  Goals of Care Determination: Patient to CPR intubation PEG tube and dialysis no tracheostomy or long-term ventilation  Code Status: full code  Time spent on Advanced care Plannin minutes  [address] documented patient's wishes, would like Mother Josey Dawn to make medical decisions if unable to make decisions    Mandie Oliveira MD  11/3/2021 12:28 PM

## 2021-11-03 NOTE — FLOWSHEET NOTE
Pt transported from cath lab to [place] [address] room 2914, a/o, VSS. Rt groin unremarkable and pulses palpable. Rt venous and art sheaths in place. Will monitor and check ACT per protocol.

## 2021-11-03 NOTE — PROGRESS NOTES
[facility]  Procedure Note    Procedure: [facility]  Indication: UA    Procedure Details:  Consent Access Bleed R Sedat Start Stop Versed Fentanyl Contrast Fluoro EBL Comp Spec   Yes RCFA low MCSFC 1516 1614 1.5 75 75 8.0 <20 None None   *Ultrasound Note: Ultrasound guidance used to determine aforementioned artery patency, size (>2mm), anatomic variations and ideal puncture location. Real-time ultrasound utilized concurrent with vascular needle entry into the artery. Image(s) permanently recorded and reported in the patient chart. *Sedation Note: MCSFC = minimal conscious sedation for comfort    Findings:  Artery Findings/Result   LM Normal   LAD 20% mid   Cx Patent OM stent   RI N/A   RCA 70% mid X2 (FFR 0.79)   LVEDP 6   LVG 60%     Intervention:   PCI of RCA with 3.0X38 overlapping prox with 3.5X38 Xience SAHIL (All PD with 3.5NC)    Post Cath Dx:   CAD as above    Med Rec:   Recommendation Indicated? Not Given Due To: Note   DAPT  yes  Asa, plavix   STATIN - HIGH DOSE yes  lipitor 80   BETA BLOCKER yes Low HR    ACE/ARB/ARNI no     ALDACTONE no       Non-Med Rec:   Category Recommendation   EF ASSESSMENT Noninvasive assessment of EF if LVG deferred as IP/OP as appropriate   TOBACCO Avoidance of first and second hand smoke   DIET/EXERCISE Maintain healthy lifestyle with focus on diet, exercise and weight loss   CARDIAC REHAB Referral to outpatient cardiac rehab phase II will be deferred until patient follow-up in office and then determine patient safety and appropriateness to proceed. STATIN Patient started or continued on max tolerated dose of statin or high intensity statin as appropriate.   If no statin prescribed, secondary to intolerance, allergy or patient refusal.

## 2021-11-03 NOTE — FLOWSHEET NOTE
Vitals:    11/03/21 1314   BP: 124/71   Pulse: 80   Resp: 18   Temp: 97.8 °F (36.6 °C)   SpO2: 92%     Pt. To room 3325 from ED via W/C. Pt. VSS, patient reports Chest pain almost resolved but she is having her usual chronic pain, patient oriented to room. Pt. Updated on POC, denies questions. Pt. Given toiletries, denies further needs. Bed in lowest position, bed alarm activated, call light and bedside table within reach. Will continue to monitor. Silvano Gillis RN        11/03/21 1314   Assessment   Charting Type Admission   Neurological   Neuro (WDL) X  (BLE neuropathy)   Level of Consciousness Alert (0)   Orientation Level Oriented X4   Cognition Appropriate judgement; Appropriate safety awareness; Appropriate attention/concentration; Appropriate for developmental age; Follows commands   Language Clear; Appropriate for developmental age   [de-identified] Coma Scale   Eye Opening 4   Best Verbal Response 5   Best Motor Response 6   [place] Coma Scale Score 15   HEENT   HEENT (WDL) X   Teeth Dentures upper   Right Eye Intact; Impaired vision   Left Eye Intact; Impaired vision   Respiratory   Respiratory (WDL) X  (h/o emphasema)   Respiratory Pattern Regular   Respiratory Depth Normal   Respiratory Quality/Effort Unlabored;Dyspnea with exertion   Chest Assessment Chest expansion symmetrical;Trachea midline   L Breath Sounds Clear   R Breath Sounds Clear   Breath Sounds   Right Upper Lobe Clear   Right Middle Lobe Clear   Right Lower Lobe Clear   Left Upper Lobe Clear   Left Lower Lobe Clear   Cardiac   Cardiac (WDL) X   Cardiac Regularity Regular   Heart Sounds S1, S2   Cardiac Rhythm Sinus rhythm   Rhythm Interpretation   Pulse 80   Cardiac Monitor   Telemetry Monitor On Yes   Telemetry Audible Yes   Telemetry Alarms Set Yes   Telemetry Box Number [id number]   Telemetry Monitor Alarm Parameters    Gastrointestinal   Abdominal (WDL) X   Abdomen Inspection Soft;Non-distended   Last BM (including prior to admit) 11/03/21   Tenderness Soft; No guarding;Nontender; No rebound   RUQ Bowel Sounds Active   LUQ Bowel Sounds Active   RLQ Bowel Sounds Active   LLQ Bowel Sounds Active   Peripheral Vascular   Peripheral Vascular (WDL) WDL   Edema Right lower extremity; Left lower extremity   RLE Edema Trace   LLE Edema Trace   Sensation RLE Decreased;Numbness;Tingling   Sensation LLE Decreased;Numbness;Tingling   Skin Color/Condition   Skin Color/Condition (WDL) WDL   Skin Color Appropriate for ethnicity   Skin Condition/Temp Warm;Dry   Skin Integrity   Skin Integrity (WDL) WDL   Musculoskeletal   Musculoskeletal (WDL) WDL   Genitourinary   Genitourinary (WDL) WDL   Flank Tenderness No   Suprapubic Tenderness No   Dysuria No   Anus/Rectum   Anus/Rectum (WDL) WDL   Psychosocial   Psychosocial (WDL) WDL

## 2021-11-04 VITALS
HEIGHT: 72 IN | HEART RATE: 81 BPM | TEMPERATURE: 97.2 F | SYSTOLIC BLOOD PRESSURE: 115 MMHG | RESPIRATION RATE: 15 BRPM | DIASTOLIC BLOOD PRESSURE: 67 MMHG | OXYGEN SATURATION: 99 % | WEIGHT: 154.1 LBS | BODY MASS INDEX: 20.87 KG/M2

## 2021-11-04 LAB
ANION GAP SERPL CALCULATED.3IONS-SCNC: 11 MMOL/L (ref 3–16)
BASOPHILS ABSOLUTE: 0.1 K/UL (ref 0–0.2)
BASOPHILS RELATIVE PERCENT: 0.7 %
BUN BLDV-MCNC: 22 MG/DL (ref 7–20)
CALCIUM SERPL-MCNC: 9.3 MG/DL (ref 8.3–10.6)
CHLORIDE BLD-SCNC: 103 MMOL/L (ref 99–110)
CO2: 24 MMOL/L (ref 21–32)
CREAT SERPL-MCNC: 0.8 MG/DL (ref 0.6–1.2)
EOSINOPHILS ABSOLUTE: 0.1 K/UL (ref 0–0.6)
EOSINOPHILS RELATIVE PERCENT: 1 %
GFR AFRICAN AMERICAN: >60
GFR NON-AFRICAN AMERICAN: >60
GLUCOSE BLD-MCNC: 138 MG/DL (ref 70–99)
HCT VFR BLD CALC: 33 % (ref 36–48)
HEMOGLOBIN: 10.9 G/DL (ref 12–16)
LYMPHOCYTES ABSOLUTE: 1.8 K/UL (ref 1–5.1)
LYMPHOCYTES RELATIVE PERCENT: 21.5 %
MCH RBC QN AUTO: 29.6 PG (ref 26–34)
MCHC RBC AUTO-ENTMCNC: 33.1 G/DL (ref 31–36)
MCV RBC AUTO: 89.4 FL (ref 80–100)
MONOCYTES ABSOLUTE: 1.1 K/UL (ref 0–1.3)
MONOCYTES RELATIVE PERCENT: 13.1 %
NEUTROPHILS ABSOLUTE: 5.2 K/UL (ref 1.7–7.7)
NEUTROPHILS RELATIVE PERCENT: 63.7 %
PDW BLD-RTO: 15 % (ref 12.4–15.4)
PLATELET # BLD: 247 K/UL (ref 135–450)
PMV BLD AUTO: 7.5 FL (ref 5–10.5)
POC ACT LR: 154 SEC
POC ACT LR: 178 SEC
POC ACT LR: 188 SEC
POTASSIUM REFLEX MAGNESIUM: 3.7 MMOL/L (ref 3.5–5.1)
RBC # BLD: 3.69 M/UL (ref 4–5.2)
SODIUM BLD-SCNC: 138 MMOL/L (ref 136–145)
WBC # BLD: 8.2 K/UL (ref 4–11)

## 2021-11-04 PROCEDURE — 2580000003 HC RX 258: Performed by: INTERNAL MEDICINE

## 2021-11-04 PROCEDURE — 85025 COMPLETE CBC W/AUTO DIFF WBC: CPT

## 2021-11-04 PROCEDURE — 6360000002 HC RX W HCPCS: Performed by: INTERNAL MEDICINE

## 2021-11-04 PROCEDURE — 6370000000 HC RX 637 (ALT 250 FOR IP): Performed by: INTERNAL MEDICINE

## 2021-11-04 PROCEDURE — G0378 HOSPITAL OBSERVATION PER HR: HCPCS

## 2021-11-04 PROCEDURE — 96372 THER/PROPH/DIAG INJ SC/IM: CPT

## 2021-11-04 PROCEDURE — 80048 BASIC METABOLIC PNL TOTAL CA: CPT

## 2021-11-04 RX ORDER — NITROGLYCERIN 0.4 MG/1
0.4 TABLET SUBLINGUAL EVERY 5 MIN PRN
Qty: 25 TABLET | Refills: 3 | Status: SHIPPED | OUTPATIENT
Start: 2021-11-04

## 2021-11-04 RX ORDER — CLOPIDOGREL BISULFATE 75 MG/1
75 TABLET ORAL DAILY
Qty: 30 TABLET | Refills: 3 | Status: SHIPPED | OUTPATIENT
Start: 2021-11-05 | End: 2022-03-04 | Stop reason: SDUPTHER

## 2021-11-04 RX ORDER — PANTOPRAZOLE SODIUM 40 MG/1
40 TABLET, DELAYED RELEASE ORAL
Qty: 30 TABLET | Refills: 3 | Status: SHIPPED | OUTPATIENT
Start: 2021-11-04

## 2021-11-04 RX ADMIN — THIAMINE HCL TAB 100 MG 100 MG: 100 TAB at 00:45

## 2021-11-04 RX ADMIN — PANTOPRAZOLE SODIUM 40 MG: 40 TABLET, DELAYED RELEASE ORAL at 08:18

## 2021-11-04 RX ADMIN — ASPIRIN 81 MG 81 MG: 81 TABLET ORAL at 08:15

## 2021-11-04 RX ADMIN — TIOTROPIUM BROMIDE INHALATION SPRAY 1 PUFF: 3.12 SPRAY, METERED RESPIRATORY (INHALATION) at 08:55

## 2021-11-04 RX ADMIN — GABAPENTIN 300 MG: 300 CAPSULE ORAL at 08:15

## 2021-11-04 RX ADMIN — LOSARTAN POTASSIUM 100 MG: 100 TABLET, FILM COATED ORAL at 08:15

## 2021-11-04 RX ADMIN — Medication 2 PUFF: at 08:55

## 2021-11-04 RX ADMIN — CLOPIDOGREL BISULFATE 75 MG: 75 TABLET ORAL at 08:15

## 2021-11-04 RX ADMIN — HYDROCHLOROTHIAZIDE 25 MG: 25 TABLET ORAL at 08:14

## 2021-11-04 RX ADMIN — FOLIC ACID 1 MG: 1 TABLET ORAL at 08:15

## 2021-11-04 RX ADMIN — Medication 10 ML: at 02:13

## 2021-11-04 RX ADMIN — Medication 1 PUFF: at 08:55

## 2021-11-04 RX ADMIN — HYDROCODONE BITARTRATE AND ACETAMINOPHEN 1 TABLET: 10; 325 TABLET ORAL at 08:18

## 2021-11-04 RX ADMIN — ENOXAPARIN SODIUM 40 MG: 100 INJECTION SUBCUTANEOUS at 08:18

## 2021-11-04 RX ADMIN — THIAMINE HCL TAB 100 MG 100 MG: 100 TAB at 08:15

## 2021-11-04 ASSESSMENT — PAIN SCALES - GENERAL
PAINLEVEL_OUTOF10: 0
PAINLEVEL_OUTOF10: 0
PAINLEVEL_OUTOF10: 4
PAINLEVEL_OUTOF10: 0
PAINLEVEL_OUTOF10: 2
PAINLEVEL_OUTOF10: 0

## 2021-11-04 NOTE — PROGRESS NOTES
Message sent to Dr. Heidy Duque via Cardiac Coordinator regarding pt's bp not being high enough systolically to receive her AM metoprolol. Per [facility] Coordinator RN, Dr. Heidy Duque would like for her to still take metoprolol as ordered.        Electronically signed by Isatu Rodarte RN on 11/4/2021 at 12:12 PM

## 2021-11-04 NOTE — PROGRESS NOTES
MD order is written to remove. Removed at 2229. Recovery time starts 2259. Recovery time ends 0259. . See Mar for pre-medications. Description of site clean, no oozing or hematoma   Sheath removed per protocol. Manual pressure times 20 minutes. Hemostasis achieved. Dry dressing applied. Site soft, no bleeding, no hematoma. Pt aware of restrictions and signs and symptoms of bleeding.

## 2021-11-04 NOTE — DISCHARGE SUMMARY
Hospital Medicine Discharge Summary    Patient: Margaret Eduardo     Gender: female  : 1955   Age: 78 y.o. MRN: [id number]    Admitting Physician: Luh Perdomo MD  Discharge Physician: Luh Perdomo MD     Code Status: Full Code     Admit Date: 11/3/2021   Discharge Date:   21    Disposition:  Home  Time spent arranging discharge: 35 minutes    Discharge Diagnoses: Active Hospital Problems    Diagnosis Date Noted    Chest pain [R07.9] 2021   obstructive CAD      Condition at Discharge:  Stable    Hospital Course:   Noted to hospital with chest pain underwent cardiac cath and had PCI of RCA with drug-eluting stent was discharged on aspirin Plavix per cardiology will follow up with cardiology as outpatient    Discharge Exam:    /67   Pulse 81   Temp 97.2 °F (36.2 °C) (Temporal)   Resp 15   Ht 6' 3\" (1.905 m)   Wt 154 lb 1.6 oz (69.9 kg)   SpO2 99%   BMI 19.26 kg/m²   General appearance:  Appears comfortable. AAOx3  HEENT: atraumatic, Pupils equal, muscous membranes moist, no masses appreciated  Cardiovascular: Regular rate and rhythm no murmurs appreciated  Respiratory: CTAB no wheezing  Gastrointestinal: Abdomen soft, non-tender, BS+  EXT: no edema  Neurology: no gross focal deficts  Psychiatry: Appropriate affect.  Not agitated  Skin: Warm, dry, no rashes appreciated    Discharge Medications:   Current Discharge Medication List      START taking these medications    Details   clopidogrel (PLAVIX) 75 MG tablet Take 1 tablet by mouth daily  Qty: 30 tablet, Refills: 3           Current Discharge Medication List      CONTINUE these medications which have CHANGED    Details   pantoprazole (PROTONIX) 40 MG tablet Take 1 tablet by mouth 2 times daily (before meals)  Qty: 30 tablet, Refills: 3           Current Discharge Medication List      CONTINUE these medications which have NOT CHANGED    Details   HYDROcodone-acetaminophen  MG TABS Take 10 mg of opioid by mouth 4 times daily as needed for Pain. Vicodin 10/325 mg every 6 hours as needed      !! fluticasone-umeclidin-vilant (TRELEGY ELLIPTA) 100-62.5-25 MCG/INH AEPB Inhale 1 puff into the lungs daily  Qty: 1 each, Refills: 6    Associated Diagnoses: COPD, severe ([address]      ! ! fluticasone-umeclidin-vilant (TRELEGY ELLIPTA) 100-62.5-25 MCG/INH AEPB Inhale 1 puff into the lungs daily  Qty: 3 each, Refills: 3    Associated Diagnoses: COPD, severe (HCC)      metoprolol tartrate (LOPRESSOR) 25 MG tablet Take 0.5 tablets by mouth 2 times daily  Qty: 60 tablet, Refills: 3      folic acid (FOLVITE) 1 MG tablet Take 1 tablet by mouth daily  Qty: 30 tablet, Refills: 3      vitamin B-1 100 MG tablet Take 1 tablet by mouth daily  Qty: 30 tablet, Refills: 3      albuterol sulfate HFA (PROAIR HFA) 108 (90 Base) MCG/ACT inhaler Inhale 2 puffs into the lungs every 6 hours as needed for Wheezing      aspirin 81 MG chewable tablet Take 81 mg by mouth daily      atorvastatin (LIPITOR) 80 MG tablet Take 80 mg by mouth daily      gabapentin (NEURONTIN) 300 MG capsule Take 300 mg by mouth 3 times daily. hydroCHLOROthiazide (HYDRODIURIL) 25 MG tablet Take 25 mg by mouth daily      ipratropium-albuterol (DUONEB) 0.5-2.5 (3) MG/3ML SOLN nebulizer solution Inhale 1 vial into the lungs every 4 hours      losartan (COZAAR) 100 MG tablet Take 100 mg by mouth daily       ! ! - Potential duplicate medications found. Please discuss with provider. Current Discharge Medication List          Labs:  For convenience and continuity at follow-up the following most recent labs are provided:    Lab Results   Component Value Date    WBC 8.2 11/04/2021    HGB 10.9 11/04/2021    HCT 33.0 11/04/2021    MCV 89.4 11/04/2021     11/04/2021     11/04/2021    K 3.3 11/03/2021     11/04/2021    CO2 24 11/04/2021    BUN 22 11/04/2021    CREATININE 0.8 11/04/2021    CALCIUM 9.3 11/04/2021    ALKPHOS 142 10/04/2021    ALT 31 10/04/2021    AST 19 10/04/2021    BILITOT 0.6 10/04/2021    BILIDIR <0.2 10/04/2021    LABALBU 4.0 10/04/2021    TRIG 94 06/26/2020     Lab Results   Component Value Date    INR 1.00 10/04/2021    INR 1.11 09/13/2021    INR 1.07 09/09/2021       Radiology:  CT ABDOMEN PELVIS W IV CONTRAST Additional Contrast? Oral    Result Date: 10/6/2021  EXAMINATION: CT OF THE ABDOMEN AND PELVIS WITH CONTRAST 10/5/2021 5:37 pm TECHNIQUE: CT of the abdomen and pelvis was performed with the administration of intravenous contrast. Multiplanar reformatted images are provided for review. Dose modulation, iterative reconstruction, and/or weight based adjustment of the mA/kV was utilized to reduce the radiation dose to as low as reasonably achievable. COMPARISON: CT abdomen and pelvis dated 06/26/2020 HISTORY: ORDERING SYSTEM PROVIDED HISTORY: Abdominal pain, unspecified abdominal location TECHNOLOGIST PROVIDED HISTORY: Additional Contrast?->Oral Reason for Exam: Dx: Abdominal pain, unspecified abdominal location R10.9 (ICD-10-CM) Acuity: Acute Type of Exam: Initial FINDINGS: Lower Chest:  Visualized portion of the lower chest demonstrates no acute abnormality. Organs: Cirrhotic liver. The gallbladder is unremarkable. Few calcifications adjacent to the pancreatic head. The pancreas is otherwise unremarkable. The spleen and bilateral adrenal glands are unremarkable. The kidneys enhance symmetrically without evidence of hydronephrosis. GI/Bowel: There is no evidence of bowel obstruction. No evidence of abnormal bowel wall thickening or distension. The appendix is visualized and is unremarkable. No evidence of acute appendicitis. Pelvis: Bladder is unremarkable in appearance. Left adnexal cyst measuring 4.0 cm. The right adnexa and uterus are unremarkable. Peritoneum/Retroperitoneum: Atherosclerotic disease of the abdominal aorta. No retroperitoneal lymphadenopathy. No evidence of ascites or free air.  Bones/Soft Tissues:  No acute abnormality of the visualized osseous structures. Visualized soft tissues are unremarkable. No acute pathology in the abdomen and pelvis. XR CHEST PORTABLE    Result Date: 11/3/2021  EXAMINATION: ONE XRAY VIEW OF THE CHEST 11/3/2021 10:04 am COMPARISON: Chest x-ray dated 06/25/2020. HISTORY: ORDERING SYSTEM PROVIDED HISTORY: chest pain TECHNOLOGIST PROVIDED HISTORY: Reason for exam:->chest pain Acuity: Unknown FINDINGS: HEART/MEDIASTINUM: The cardiomediastinal silhouette is within normal limits. PLEURA/LUNGS: There are no focal consolidations or pleural effusions. There is no appreciable pneumothorax. BONES/SOFT TISSUE: No acute abnormality. No radiographic evidence of acute pulmonary disease.          Signed:    Ranulfo Mg MD   11/4/2021

## 2021-11-23 ENCOUNTER — OFFICE VISIT (OUTPATIENT)
Dept: CARDIOLOGY CLINIC | Age: 66
End: 2021-11-23
Payer: MEDICARE

## 2021-11-23 ENCOUNTER — TELEPHONE (OUTPATIENT)
Dept: CARDIOLOGY CLINIC | Age: 66
End: 2021-11-23

## 2021-11-23 VITALS
SYSTOLIC BLOOD PRESSURE: 120 MMHG | RESPIRATION RATE: 16 BRPM | DIASTOLIC BLOOD PRESSURE: 90 MMHG | HEIGHT: 72 IN | BODY MASS INDEX: 21.94 KG/M2 | HEART RATE: 92 BPM | OXYGEN SATURATION: 97 % | WEIGHT: 162 LBS

## 2021-11-23 DIAGNOSIS — I25.10 CORONARY ARTERY DISEASE DUE TO LIPID RICH PLAQUE: Primary | ICD-10-CM

## 2021-11-23 DIAGNOSIS — E78.2 MIXED HYPERLIPIDEMIA: ICD-10-CM

## 2021-11-23 DIAGNOSIS — I25.83 CORONARY ARTERY DISEASE DUE TO LIPID RICH PLAQUE: Primary | ICD-10-CM

## 2021-11-23 DIAGNOSIS — I10 ESSENTIAL HYPERTENSION: ICD-10-CM

## 2021-11-23 PROCEDURE — 99214 OFFICE O/P EST MOD 30 MIN: CPT | Performed by: NURSE PRACTITIONER

## 2021-11-23 RX ORDER — ATORVASTATIN CALCIUM 80 MG/1
80 TABLET, FILM COATED ORAL DAILY
Qty: 30 TABLET | Refills: 2 | Status: SHIPPED | OUTPATIENT
Start: 2021-11-23

## 2021-11-23 RX ORDER — LOSARTAN POTASSIUM 100 MG/1
100 TABLET ORAL DAILY
Qty: 30 TABLET | Refills: 2 | Status: SHIPPED | OUTPATIENT
Start: 2021-11-23

## 2021-11-23 NOTE — PROGRESS NOTES
[address]     Outpatient Follow Up Note    CHIEF COMPLAINT / HPI: Hospital Follow Up secondary to coronary artery disease    Hospital record has been reviewed  Hospital Course progressed as follows per discharge summary:     11/3/21-11/4/21  Hospital course:  Noted to hospital with chest pain underwent cardiac cath and had PCI of RCA with drug-eluting stent was discharged on aspirin Plavix per cardiology will follow up with cardiology as outpatient      Otilio Rinaldi is 78 y.o. female who presents today for a routine follow up after a recent hospitalization related to the above mentioned issues. Subjective:   Since the time of discharge, the patient states she feels much better since getting her stents placed. Says she is always short of breath with emphysema, but she is back to her baseline. Denies palpitations, dizziness, or edema. Swelling at groin site has improved. Says she has not been taking her atorvastatin and cannot remember last time she took it. Has been out of losartan for 3 days. 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 She is anxious to get back to work and is not interested in cardiac rehab. With regard to medication therapy the patient has been compliant with prescribed regimen. They have tolerated therapy to date.      Past Medical History:   Diagnosis Date    Brain aneurysm 2003    CAD (coronary artery disease)     Cerebral artery occlusion with cerebral infarction ([facility] [address] 2003    with brain aneurysm    COPD (chronic obstructive pulmonary disease) (HCC)     emphsyma    ETOH abuse     GERD (gastroesophageal reflux disease)     Glaucoma     History of blood transfusion 2003    HTN (hypertension)     Hx SBO     Hyperlipidemia     Myocardial infarct, old 2016    Pancreatitis     Shingles      Social History:    Social History     Tobacco Use   Smoking Status Former Smoker    Packs/day: 1.00    Years: 30.00    Pack years: 30.00    Quit date: 2016    Years since quittin.8   Smokeless Tobacco Never Used   Tobacco Comment    documented in the chart that she was a heavy smoker in the past with 30 pack year     Current Medications:  Current Outpatient Medications   Medication Sig Dispense Refill    clopidogrel (PLAVIX) 75 MG tablet Take 1 tablet by mouth daily 30 tablet 3    pantoprazole (PROTONIX) 40 MG tablet Take 1 tablet by mouth 2 times daily (before meals) 30 tablet 3    nitroGLYCERIN (NITROSTAT) 0.4 MG SL tablet Place 1 tablet under the tongue every 5 minutes as needed for Chest pain 25 tablet 3    metoprolol tartrate (LOPRESSOR) 25 MG tablet Take 0.5 tablets by mouth 2 times daily 60 tablet 3    HYDROcodone-acetaminophen  MG TABS Take 10 mg of opioid by mouth 4 times daily as needed for Pain. Vicodin 10/325 mg every 6 hours as needed      fluticasone-umeclidin-vilant (TRELEGY ELLIPTA) 100-62.5-25 MCG/INH AEPB Inhale 1 puff into the lungs daily (Patient taking differently: Inhale 1 puff into the lungs daily as needed ) 1 each 6    fluticasone-umeclidin-vilant (TRELEGY ELLIPTA) 100-62.5-25 MCG/INH AEPB Inhale 1 puff into the lungs daily 3 each 3    folic acid (FOLVITE) 1 MG tablet Take 1 tablet by mouth daily 30 tablet 3    vitamin B-1 100 MG tablet Take 1 tablet by mouth daily 30 tablet 3    albuterol sulfate HFA (PROAIR HFA) 108 (90 Base) MCG/ACT inhaler Inhale 2 puffs into the lungs every 6 hours as needed for Wheezing      aspirin 81 MG chewable tablet Take 81 mg by mouth daily      atorvastatin (LIPITOR) 80 MG tablet Take 80 mg by mouth daily      gabapentin (NEURONTIN) 300 MG capsule Take 300 mg by mouth 3 times daily.  ipratropium-albuterol (DUONEB) 0.5-2.5 (3) MG/3ML SOLN nebulizer solution Inhale 1 vial into the lungs every 4 hours       No current facility-administered medications for this visit.      REVIEW OF SYSTEMS:   CONSTITUTIONAL: No major weight gain or loss, fatigue, weakness, night sweats or fever. There's been no change in energy level, sleep pattern, or activity level. HEENT: No new vision difficulties or ringing in the ears. RESPIRATORY: No new SOB, PND, orthopnea or cough. CARDIOVASCULAR: See HPI  GI: No nausea, vomiting, diarrhea, constipation, abdominal pain or changes in bowel habits. : No urinary frequency, urgency, incontinence hematuria or dysuria. SKIN: No cyanosis or skin lesions. MUSCULOSKELETAL: No new muscle or joint pain. NEUROLOGICAL: No syncope or TIA-like symptoms. PSYCHIATRIC: No anxiety, pain, insomnia or depression    Objective:   PHYSICAL EXAM:        VITALS:  BP (!) 120/90 (Site: Right Upper Arm, Position: Sitting, Cuff Size: Large Adult)   Pulse 92   Resp 16   Ht 6' 3\" (1.905 m)   Wt 162 lb (73.5 kg)   SpO2 97%   Breastfeeding No   BMI 20.25 kg/m²     CONSTITUTIONAL: Cooperative, no apparent distress, and appears well nourished / developed  NEUROLOGIC:  Awake and orientated to person, place and time. PSYCH: Calm affect. SKIN: Warm and dry. HEENT: Sclera non-icteric, normocephalic, neck supple, no elevation of JVP, normal carotid pulses with no bruits and thyroid normal size. LUNGS:  No increased work of breathing and clear to auscultation, no crackles or wheezing. CARDIOVASCULAR:  Regular rate and rhythm with no murmurs, gallops, rubs, or abnormal heart sounds, normal PMI. The apical impulses not displaced. Heart tones are crisp and normal                                                                                            Cervical veins are not engorged              ABDOMEN:  Normal bowel sounds, non-distended and non-tender to palpation   EXT: No edema, no calf tenderness. Pulses are present bilaterally.     DATA:    Lab Results   Component Value Date    ALT 31 10/04/2021    AST 19 10/04/2021    ALKPHOS 142 (H) 10/04/2021    BILITOT 0.6 10/04/2021     Lab Results   Component Value Date    CREATININE 0.8 11/04/2021 4. COPD           ~ follows with Dr Dio Linda    Patient  is stable since hospital discharge. Plan:   1. OK to resume statin as ordered at discharge. Check fasting labs in 6-8 weeks (around the week of 12/13)  2. Call if you change your mind and would like cardiac rehab  3. RTO in 3 months with Dr Rito Malone     I have addressed the patient's cardiac risk factors and adjusted pharmacologic treatment as needed. In addition, I have reinforced the need for patient directed risk factor modification. Further evaluation will be based upon the patient's clinical course and testing results. All questions and concerns were addressed to the patient     The patient  currently is not smoking. The risks related to smoking were reviewed with the patient. Recommend maintaining a smoke-free lifestyle. Dual Antiplatelet therapy / anti-coagulation has been recommended / prescribed for this patient. Education conducted on adverse reactions including bleeding was discussed. The patient verbalizes understanding. Pt is on a BB  Pt is on an ace-i/ARB  Pt is on a statin      Saturated fat diet discussed  Exercise program discussed    Thank you for allowing to us to participate in the care of Louann Collins.       [address]  Documentation of today's visit sent to PCP

## 2021-11-23 NOTE — LETTER
[address] [place] [address] [address] [id number]  Phone: [phone]  Fax: [phone]    GHISLAINE Munguia CNP        November 23, 2021     Patient: Jorge Median   YOB: 1955   Date of Visit: 11/23/2021       To Whom It May Concern: It is my medical opinion that Selena Shell is able to return to work, full duty, starting Friday 11/26/21. If you have any questions or concerns, please don't hesitate to call.     Sincerely,        GHISLAINE Munguia CNP

## 2021-11-24 NOTE — TELEPHONE ENCOUNTER
Spoke with the patient and advised her of the message below . She voiced understanding. Call complete

## 2022-02-07 ENCOUNTER — HOSPITAL ENCOUNTER (OUTPATIENT)
Age: 67
Discharge: HOME | End: 2022-02-07
Payer: MEDICARE

## 2022-02-07 LAB
ALBUMIN SERPL-MCNC: 4 G/DL (ref 3.4–5)
ALP BLD-CCNC: 83 U/L (ref 40–129)
ALT SERPL-CCNC: 19 U/L (ref 10–40)
AST SERPL-CCNC: 24 U/L (ref 15–37)
BILIRUB SERPL-MCNC: 0.8 MG/DL (ref 0–1)
BILIRUBIN DIRECT: <0.2 MG/DL (ref 0–0.3)
BILIRUBIN, INDIRECT: NORMAL MG/DL (ref 0–1)
TOTAL PROTEIN: 7.7 G/DL (ref 6.4–8.2)

## 2022-02-07 PROCEDURE — 36415 COLL VENOUS BLD VENIPUNCTURE: CPT

## 2022-02-07 PROCEDURE — 80076 HEPATIC FUNCTION PANEL: CPT

## 2022-03-01 NOTE — ASSESSMENT & PLAN NOTE
/82 (Site: Left Upper Arm, Position: Sitting, Cuff Size: Medium Adult)   Pulse 53   Ht 5' 6\" (1.676 m)   SpO2 95%   BMI 26.15 kg/m²   Meds~ losartan / metoprolol   Plan~ controlled on current meds

## 2022-03-01 NOTE — ASSESSMENT & PLAN NOTE
Angina~ none  CCS class 1  Intervention  Stress~ 6/25/20 normal stress  [facility]~   2016- PCI to OM1 (janneth)  11/3/2021 PCI to RCA (jermaine)  Uli Arthur  6/24/20 EF 70% normal function  Current meds~ asa / plavix  Plan~ having rare nose bleeds, she is able to keep them controlled. Will discuss stopping a blood thinner at next visit.

## 2022-03-01 NOTE — PROGRESS NOTES
[address]   Cardiac Evaluation      Patient: Cong Gurrola  YOB: 1955         Chief Complaint   Patient presents with    Coronary Artery Disease    Hyperlipidemia    Hypertension    3 Month Follow-Up        Referring provider: Emily Davis MD    History of Present Illness:   Ms Kevan Gross is a 76 y.o. female here as a follow up for CAD (s/p PCI to OM1 '16, RCA '21). She also has a history of Htn, alcoholic pancreatitis, and autoimmune hepatitis with transaminitis (following GI)    She presented to [facility] in June '20 with uti and abdominal pain. Cardiology was consulted for elevated troponins - likely demand ischemia from acute illness. Echo and stress were normal, ECG was abnormal but no angina present. She was seen in hospital 11/2021 with chest pains again and had C with Dr Sonia Ash with PCI of RCA. Today she is here for a 3 month follow up on recent stenting. She says that she does feel better since having them. She still has shortness of breath d/t her emphysema, but no chest pain. She does have nose bleeds sometimes. 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 With regard to medication therapy he/she has been compliant with prescribed regimen and has tolerated therapy to date. Past Medical History:   has a past medical history of Brain aneurysm, CAD (coronary artery disease), Cerebral artery occlusion with cerebral infarction ([facility] [address] COPD (chronic obstructive pulmonary disease) ([facility] [address] ETOH abuse, GERD (gastroesophageal reflux disease), Glaucoma, History of blood transfusion, HTN (hypertension), Hx SBO, Hyperlipidemia, Myocardial infarct, old, Pancreatitis, and Shingles. Surgical History:   has a past surgical history that includes Abdomen surgery; US BIOPSY LIVER PERCUTANEOUS (7/2/2020); Colonoscopy (09/2021); Endoscopy, colon, diagnostic (09/2021); Hysterectomy (2001);  Dilatation, esophagus; Breast surgery (2009); and Coronary angioplasty with stent (2016). Current Outpatient Medications   Medication Sig Dispense Refill    atorvastatin (LIPITOR) 80 MG tablet Take 1 tablet by mouth daily 30 tablet 2    losartan (COZAAR) 100 MG tablet Take 1 tablet by mouth daily 30 tablet 2    pantoprazole (PROTONIX) 40 MG tablet Take 1 tablet by mouth 2 times daily (before meals) 30 tablet 3    nitroGLYCERIN (NITROSTAT) 0.4 MG SL tablet Place 1 tablet under the tongue every 5 minutes as needed for Chest pain 25 tablet 3    metoprolol tartrate (LOPRESSOR) 25 MG tablet Take 0.5 tablets by mouth 2 times daily 60 tablet 3    HYDROcodone-acetaminophen  MG TABS Take 10 mg of opioid by mouth 4 times daily as needed for Pain. Vicodin 10/325 mg every 6 hours as needed      fluticasone-umeclidin-vilant (TRELEGY ELLIPTA) 100-62.5-25 MCG/INH AEPB Inhale 1 puff into the lungs daily (Patient taking differently: Inhale 1 puff into the lungs daily as needed ) 1 each 6    folic acid (FOLVITE) 1 MG tablet Take 1 tablet by mouth daily 30 tablet 3    vitamin B-1 100 MG tablet Take 1 tablet by mouth daily 30 tablet 3    albuterol sulfate HFA (PROAIR HFA) 108 (90 Base) MCG/ACT inhaler Inhale 2 puffs into the lungs every 6 hours as needed for Wheezing      aspirin 81 MG chewable tablet Take 81 mg by mouth daily      gabapentin (NEURONTIN) 300 MG capsule Take 300 mg by mouth 3 times daily.  ipratropium-albuterol (DUONEB) 0.5-2.5 (3) MG/3ML SOLN nebulizer solution Inhale 1 vial into the lungs every 4 hours      clopidogrel (PLAVIX) 75 MG tablet Take 1 tablet by mouth daily 90 tablet 3     No current facility-administered medications for this visit.        Allergies:  Lisinopril and Dilaudid [hydromorphone hcl]     Social History:  Social History     Socioeconomic History    Marital status: Single     Spouse name: Not on file    Number of children: Not on file    Years of education: Not on file    Highest education level: Not on file   Occupational History    Not on file   Tobacco Use    Smoking status: Former Smoker     Packs/day: 1.00     Years: 30.00     Pack years: 30.00     Types: Cigarettes     Quit date: 2016     Years since quittin.1    Smokeless tobacco: Never Used    Tobacco comment: documented in the chart that she was a heavy smoker in the past with 30 pack year   Vaping Use    Vaping Use: Never used   Substance and Sexual Activity    Alcohol use: Not Currently    Drug use: Never    Sexual activity: Not on file   Other Topics Concern    Not on file   Social History Narrative    Not on file     Social Determinants of Health     Financial Resource Strain:     Difficulty of Paying Living Expenses: Not on file   Food Insecurity:     Worried About Running Out of Food in the Last Year: Not on file    Phu of Food in the Last Year: Not on file   Transportation Needs:     Lack of Transportation (Medical): Not on file    Lack of Transportation (Non-Medical):  Not on file   Physical Activity:     Days of Exercise per Week: Not on file    Minutes of Exercise per Session: Not on file   Stress:     Feeling of Stress : Not on file   Social Connections:     Frequency of Communication with Friends and Family: Not on file    Frequency of Social Gatherings with Friends and Family: Not on file    Attends Mu-ism Services: Not on file    Active Member of [address] [place] [address] or Organizations: Not on file    Attends Club or Organization Meetings: Not on file    Marital Status: Not on file   Intimate Partner Violence:     Fear of Current or Ex-Partner: Not on file    Emotionally Abused: Not on file    Physically Abused: Not on file    Sexually Abused: Not on file   Housing Stability:     Unable to Pay for Housing in the Last Year: Not on file    Number of [place] in the Last Year: Not on file    Unstable Housing in the Last Year: Not on file       Family History:   Family History   Problem Relation Age of Onset    Diabetes Sister      Family history has been reviewed and not pertinent except as noted above. Review of Systems:   · Constitutional: there has been no unanticipated weight loss. No change in energy or activity level   · Eyes: No visual changes   · ENT: No Headaches, hearing loss or vertigo. No mouth sores or sore throat. · Cardiovascular: Reviewed in HPI  · Respiratory: No cough or wheezing, no sputum production. · Gastrointestinal: No abdominal pain, appetite loss, blood in stools. No change in bowel or bladder habits. · Genitourinary: No nocturia, dysuria, trouble voiding  · Musculoskeletal:  No gait disturbance, weakness or joint complaints. · Integumentary: No rash or pruritis. · Neurological: No headache, change in muscle strength, numbness or tingling. No change in gait, balance, coordination, mood, affect, memory, mentation, behavior. · Psychiatric: No anxiety or depression  · Endocrine: No malaise or fever  · Hematologic/Lymphatic: No abnormal bruising or bleeding, blood clots or swollen lymph nodes. · Allergic/Immunologic: No nasal congestion or hives. Physical Examination:    Vitals:    03/04/22 0842   BP: 118/82   Site: Left Upper Arm   Position: Sitting   Cuff Size: Medium Adult   Pulse: 53   SpO2: 95%   Height: 5' 6\" (1.676 m)     Body mass index is 26.15 kg/m². Wt Readings from Last 3 Encounters:   11/23/21 162 lb (73.5 kg)   11/04/21 154 lb 1.6 oz (69.9 kg)   04/28/21 173 lb (78.5 kg)      BP Readings from Last 3 Encounters:   03/04/22 118/82   11/23/21 (!) 120/90   11/04/21 115/67        Physical Examination:    · CONSTITUTIONAL: Well developed, well nourished  · EYES: PERRLA. No xanthelasma, sclera non icteric  · EARS,NOSE,MOUTH,THROAT:  Mucous membranes moist, normal hearing  · NECK: Supple, JVP normal, thyroid not enlarged. Carotids 2+ without bruits  · RESPIRATORY: Normal effort, no rales or rhonchi  · CARDIOVASCULAR: Normal PMI, regular rate and rhythm, no murmurs, rub or gallop. No edema.  Radial pulses present and equal  · CHEST: No scar or masses  · ABDOMEN: Normal bowel sounds. No masses or tenderness. No bruit  · MUSCULOSKELETAL: No clubbing or cyanosis. Moves all extremities well. Normal gait  · SKIN:  Warm and dry. No rashes  · NEUROLOGIC: Cranial nerves intact. Alert and oriented  · PSYCHIATRIC: Calm affect. Appears to have normal judgement and insight    All testing and labs listed below were personally reviewed by myself. Last Angiogram 11/3/21: Fish Gudino  Findings:  Artery Findings/Result   LM Normal   LAD 20% mid   Cx Patent OM stent   RI N/A   RCA 70% mid X2 (FFR 0.79)   LVEDP 6   LVG 60%      Intervention:         PCI of RCA with 3.0X38 overlapping prox with 3.5X38 Xience SAHIL (All PD with 3.5NC)     Post Cath Dx:       CAD as above      Stress 6/25/20  Impression  Normal LV wall motion and function  Normal myocardial perfusion       Echo 6/24/20  Summary  Normal left ventricle size, wall thickness, and systolic function with an  estimated ejection fraction of 70%. No regional wall motion abnormalities are seen. Normal diastolic function. Avg. E/e'=11.35    Cardiac cath [facility] 9/22/2016:  Coronary angiography:   1. LM is of large caliber and gives rise to the LAD and left   circumflex coronary arteries. It is free of significant disease  2. LAD system is large caliber system and has diffuse mild   disease 20-30% without any significant obstructive lesion. It   gives rise to 2 small caliber diagonal that has moderate to   severe ostial disease. 3. LCx system is large caliber and gives rise to medium caliber   OM1. OM1 has 99% subtotal occlusion followed by sequential 85%   lesion with JANN 2 flow. 4. RCA system is large-caliber system and has mild diffuse   disease with focal lesion in the midsegment about 80% tubular   lesion in between mid and distal segment about 85%. It   bifurcates. Posterior lateral system that has luminal   irregularities.       Impression:  Successful PCI

## 2022-03-04 ENCOUNTER — OFFICE VISIT (OUTPATIENT)
Dept: CARDIOLOGY CLINIC | Age: 67
End: 2022-03-04
Payer: MEDICARE

## 2022-03-04 VITALS
SYSTOLIC BLOOD PRESSURE: 118 MMHG | DIASTOLIC BLOOD PRESSURE: 82 MMHG | OXYGEN SATURATION: 95 % | HEIGHT: 66 IN | BODY MASS INDEX: 26.15 KG/M2 | HEART RATE: 53 BPM

## 2022-03-04 DIAGNOSIS — I10 HYPERTENSION, UNSPECIFIED TYPE: ICD-10-CM

## 2022-03-04 DIAGNOSIS — I25.10 CORONARY ARTERY DISEASE DUE TO LIPID RICH PLAQUE: ICD-10-CM

## 2022-03-04 DIAGNOSIS — E78.2 MIXED HYPERLIPIDEMIA: ICD-10-CM

## 2022-03-04 DIAGNOSIS — I25.83 CORONARY ARTERY DISEASE DUE TO LIPID RICH PLAQUE: ICD-10-CM

## 2022-03-04 PROCEDURE — 99214 OFFICE O/P EST MOD 30 MIN: CPT | Performed by: INTERNAL MEDICINE

## 2022-03-04 RX ORDER — CLOPIDOGREL BISULFATE 75 MG/1
75 TABLET ORAL DAILY
Qty: 90 TABLET | Refills: 3 | Status: SHIPPED | OUTPATIENT
Start: 2022-03-04

## 2022-03-04 NOTE — TELEPHONE ENCOUNTER
Medication Refill    Medication needing refilled:  clopidogrel (PLAVIX) 75 MG      Dosage of the medication:    How are you taking this medication (QD, BID, TID, QID, PRN): 1 tablet by mouth daily    30 or 90 day supply called in: 90 day supply    When will you run out of your medication:    Which Pharmacy are we sending the medication to?: [facility], [address]   [address] [place] [id number]   Phone:  [phone]  Fax:  [phone]

## 2022-03-04 NOTE — TELEPHONE ENCOUNTER
Received refill request for Clopidogrel from [address] [place] [address].     Last ov: 03/04/2022 [facility]    Last labs: 01/01/2022 CBC (care everywhere)    Last Refill: 11/04/2021 #30 w/ 3 refills    Next appointment: 09/09/2022 [facility]

## 2022-05-02 ENCOUNTER — TELEPHONE (OUTPATIENT)
Dept: CASE MANAGEMENT | Age: 67
End: 2022-05-02

## 2022-05-11 ENCOUNTER — OFFICE VISIT (OUTPATIENT)
Dept: PULMONOLOGY | Age: 67
End: 2022-05-11
Payer: MEDICARE

## 2022-05-11 VITALS
HEART RATE: 64 BPM | WEIGHT: 154 LBS | DIASTOLIC BLOOD PRESSURE: 72 MMHG | SYSTOLIC BLOOD PRESSURE: 128 MMHG | HEIGHT: 66 IN | BODY MASS INDEX: 24.75 KG/M2

## 2022-05-11 DIAGNOSIS — J44.9 COPD, SEVERE (HCC): ICD-10-CM

## 2022-05-11 DIAGNOSIS — Z87.891 PERSONAL HISTORY OF TOBACCO USE: Primary | ICD-10-CM

## 2022-05-11 PROCEDURE — 99214 OFFICE O/P EST MOD 30 MIN: CPT | Performed by: INTERNAL MEDICINE

## 2022-05-11 PROCEDURE — G0296 VISIT TO DETERM LDCT ELIG: HCPCS | Performed by: INTERNAL MEDICINE

## 2022-05-11 RX ORDER — IPRATROPIUM BROMIDE AND ALBUTEROL SULFATE 2.5; .5 MG/3ML; MG/3ML
1 SOLUTION RESPIRATORY (INHALATION) EVERY 4 HOURS
Qty: 360 ML | Refills: 5 | Status: SHIPPED | OUTPATIENT
Start: 2022-05-11

## 2022-05-11 RX ORDER — ALBUTEROL SULFATE 90 UG/1
2 AEROSOL, METERED RESPIRATORY (INHALATION) EVERY 6 HOURS PRN
Qty: 18 G | Refills: 5 | Status: SHIPPED | OUTPATIENT
Start: 2022-05-11

## 2022-05-11 RX ORDER — FLUTICASONE FUROATE, UMECLIDINIUM BROMIDE AND VILANTEROL TRIFENATATE 100; 62.5; 25 UG/1; UG/1; UG/1
1 POWDER RESPIRATORY (INHALATION) DAILY
Qty: 60 EACH | Refills: 6 | Status: SHIPPED | OUTPATIENT
Start: 2022-05-11

## 2022-05-11 NOTE — PROGRESS NOTES
PULMONARY OFFICE FOLLOW UP NOTE    REASON FOR VISIT:   Chief Complaint   Patient presents with    COPD       DATE OF VISIT: 5/11/2022    HISTORY OF PRESENT ILLNESS: 78y.o. year old female smoker is here for follow-up of COPD.  She has past medical history of coronary artery disease s/p PCI, hypertension, autoimmune hepatitis on prednisone.      Patient has been out of her inhaler for the last 2 months and has been feeling increased shortness of breath. She denies any cough or wheezing or chest pain or hemoptysis. Her bronchodilator regimen consist of Trelegy Ellipta 1 puff daily. Did not start pulmonary rehab yet. PFT showed severe obstruction with FEV1 35%.  CT lung cancer screening did not show any abnormal lung nodules.     Previously: Patient stated that she was diagnosed with emphysema 5 years ago. Jamesfidel Ron was at that time she quit smoking. Arlene Parr was complaining of occasional dyspnea on exertion for which she was taking albuterol inhaler as needed.  She developed Covid 19 infection in November 2020 for which she was hospitalized overnight and then discharged home.  Symptoms resolved quickly except dyspnea on exertion.  She gets short of breath and performing daily activities of her life.  Symptoms are progressive. Jessica Scrivener is no cough or chest pain.  She does complain of wheezing.  Denies any runny nose stuffy nose or postnasal drip.  Denies any acid reflux.  Patient has been taking Advair inhaler twice a day and Anoro Ellipta every day.  Her inhaler technique is poor.  Patient also states that she has gained weight while being on prednisone since July 2020 for autoimmune hepatitis.  Continue she is on 5 mg of prednisone which is being tapered slowly.     DIAGNOSTIC TEST REVIEWED:  I reviewed the PFT from 4/1/2021 and my interpretation is as follows.  Severe obstructive airway disease with hyperinflation and reduced diffusion capacity. FEV1/FVC 42  FEV1 35%, 0.78 L  FVC 66%, 1.87 L TLC 91%, 15.8.   DLCO 22%     CT chest lung cancer screening performed on 3/31/2021 did not show any abnormal lung nodules.     REVIEW OF SYSTEMS:   CONSTITUTIONAL SYMPTOMS: The patient denies fever, fatigue, night sweats, weight loss or weight gain. HEENT: No vision changes. No tinnitus, Denies sinus pain. No hoarseness, or dysphagia. NECK: Patient denies swelling in the neck. CARDIOVASCULAR: Denies chest pain, palpitation, syncope. RESPIRATORY: See above. GASTROINTESTINAL: Denies nausea, abdominal pain or change in bowel function. GENITOURINARY: Denies obstructive symptoms. No history of incontinence. BREASTS: No masses or lumps in the breasts. SKIN: No rashes or itching. MUSCULOSKELETAL: Denies weakness or bone pain. NEUROLOGICAL: No headaches or seizures. PSYCHIATRIC: Denies mood swings or depression. ENDOCRINE: Denies heat or cold intolerance or excessive thirst.  HEMATOLOGIC/LYMPHATIC: Denies easy bruising or lymph node swelling. ALLERGIC/IMMUNOLOGIC: No environmental allergies.     PAST MEDICAL HISTORY:   Past Medical History:   Diagnosis Date    Brain aneurysm 2003    CAD (coronary artery disease)     Cerebral artery occlusion with cerebral infarction ([facility] [address] 2003    with brain aneurysm    COPD (chronic obstructive pulmonary disease) ([facility] [address]     emphsyma    ETOH abuse     GERD (gastroesophageal reflux disease)     Glaucoma     History of blood transfusion 2003    HTN (hypertension)     Hx SBO     Hyperlipidemia     Myocardial infarct, old 2016    Pancreatitis     Shingles        PAST SURGICAL HISTORY:   Past Surgical History:   Procedure Laterality Date    ABDOMEN SURGERY      BREAST SURGERY  2009    biopsy-benign    COLONOSCOPY  09/2021    CORONARY ANGIOPLASTY WITH STENT PLACEMENT  2016    when she has MI 2016 (x 2)    DILATATION, ESOPHAGUS      small bowel resection 2013 d/t SBO    ENDOSCOPY, COLON, DIAGNOSTIC  09/2021    HYSTERECTOMY  2001    US GUIDED LIVER BIOPSY PERCUTANEOUS  7/2/2020 US GUIDED LIVER BIOPSY PERCUTANEOUS 2020 [facility] ULTRASOUND       SOCIAL HISTORY:   Social History     Tobacco Use    Smoking status: Former Smoker     Packs/day: 1.00     Years: 30.00     Pack years: 30.00     Types: Cigarettes     Quit date: 2016     Years since quittin.3    Smokeless tobacco: Never Used    Tobacco comment: documented in the chart that she was a heavy smoker in the past with 30 pack year   Vaping Use    Vaping Use: Never used   Substance Use Topics    Alcohol use: Not Currently    Drug use: Never       FAMILY HISTORY:   Family History   Problem Relation Age of Onset    Diabetes Sister        MEDICATIONS:     Current Outpatient Medications on File Prior to Visit   Medication Sig Dispense Refill    clopidogrel (PLAVIX) 75 MG tablet Take 1 tablet by mouth daily 90 tablet 3    atorvastatin (LIPITOR) 80 MG tablet Take 1 tablet by mouth daily 30 tablet 2    losartan (COZAAR) 100 MG tablet Take 1 tablet by mouth daily 30 tablet 2    pantoprazole (PROTONIX) 40 MG tablet Take 1 tablet by mouth 2 times daily (before meals) 30 tablet 3    nitroGLYCERIN (NITROSTAT) 0.4 MG SL tablet Place 1 tablet under the tongue every 5 minutes as needed for Chest pain 25 tablet 3    metoprolol tartrate (LOPRESSOR) 25 MG tablet Take 0.5 tablets by mouth 2 times daily 60 tablet 3    HYDROcodone-acetaminophen  MG TABS Take 10 mg of opioid by mouth 4 times daily as needed for Pain. Vicodin 10/325 mg every 6 hours as needed      folic acid (FOLVITE) 1 MG tablet Take 1 tablet by mouth daily 30 tablet 3    vitamin B-1 100 MG tablet Take 1 tablet by mouth daily 30 tablet 3    aspirin 81 MG chewable tablet Take 81 mg by mouth daily      gabapentin (NEURONTIN) 300 MG capsule Take 300 mg by mouth 3 times daily. No current facility-administered medications on file prior to visit.                ALLERGIES:   Allergies as of 2022 - Fully Reviewed 2022   Allergen Reaction Noted    Lisinopril Swelling 06/25/2020    Dilaudid [hydromorphone hcl] Itching 07/02/2020      OBJECTIVE:   height is 5' 6\" (1.676 m) and weight is 154 lb (69.9 kg). Her blood pressure is 128/72 and her pulse is 64. PHYSICAL EXAM:    CONSTITUTIONAL: She is a 78y.o.-year-old who appears her stated age. She is alert and oriented x 3 and in no acute distress. HEENT: PERRL. No scleral icterus. No thrush, atraumatic, normocephalic. NECK: Supple, without cervical or supraclavicular lymphadenopathy:  CARDIOVASCULAR: S1 S2 RRR. Without murmer  RESPIRATORY & CHEST: Lungs are clear to auscultation and percussion. No wheezing, no crackles. Good air movement  GASTROINTESTINAL & ABDOMEN: Soft, nontender, positive bowel sounds in all quadrants, non-distended, without hepatosplenomegaly. GENITOURINARY: Deferred. MUSCULOSKELETAL: No tenderness to palpation of the axial skeleton. There is no clubbing. No cyanosis. No edema of the lower extremities. SKIN OF BODY: No rash or jaundice. PSYCHIATRIC EVALUATION: Normal affect. Patient answers questions appropriately. HEMATOLOGIC/LYMPHATIC/ IMMUNOLOGIC: No palpable lymphadenopathy. NEUROLOGIC: Alert and oriented x 3. Groslly non-focal. Motor strength is 5+/5 in all muscle groups. The patient has a normal sensorium globally. ASSESSMENT AND PLAN:     1. COPD, severe ([address] FEV1 35%  Patient has been out of her inhalers and has been experiencing increased shortness of breath. - fluticasone-umeclidin-vilant (Melene Oxana) 100-62.5-25 MCG/INH AEPB; Inhale 1 puff into the lungs daily  Dispense: 60 each; Refill: 6  - albuterol sulfate HFA (PROAIR HFA) 108 (90 Base) MCG/ACT inhaler; Inhale 2 puffs into the lungs every 6 hours as needed for Wheezing  Dispense: 18 g; Refill: 5  - ipratropium-albuterol (DUONEB) 0.5-2.5 (3) MG/3ML SOLN nebulizer solution; Inhale 3 mLs into the lungs every 4 hours  Dispense: 360 mL; Refill: 5    2.  Personal history of tobacco use  CT lung cancer screening from March 21, 2021 did not show any abnormal lung nodules. - [place] VISIT TO DISCUSS LUNG CA SCREEN W LDCT  - CT Lung Screen (Annual); Future      Return in about 4 months (around 9/11/2022). Lexie Shrestha MD  Pulmonary Critical Care and Sleep Medicine  Electronically signed by Lexie Shrestha MD on 5/11/2022 at 9:43 AM     This note was completed using dragon medical speech recognition software. Grammatical errors, random word insertions, pronoun errors and incomplete sentences are occasional consequences of this technology due to software limitations. If there are questions or concerns about the content of this note of information contained within the body of this dictation they should be addressed with the provider for clarification. Low Dose CT (LDCT) Lung Screening criteria met:     Age 50-77(Medicare) or 50-80 (USPSTF)   Pack year smoking >20   Still smoking or less than 15 year since quit   No sign or symptoms of lung cancer   > 11 months since last LDCT     Risks and benefits of lung cancer screening with LDCT scans discussed:    Significance of positive screen - False-positive LDCT results often occur. 95% of all positive results do not lead to a diagnosis of cancer. Usually further imaging can resolve most false-positive results; however, some patients may require invasive procedures. Over diagnosis risk - 10% to 12% of screen-detected lung cancer cases are over diagnosed--that is, the cancer would not have been detected in the patient's lifetime without the screening. Need for follow up screens annually to continue lung cancer screening effectiveness     Risks associated with radiation from annual LDCT- Radiation exposure is about the same as for a mammogram, which is about 1/3 of the annual background radiation exposure from everyday life. Starting screening at age 54 is not likely to increase cancer risk from radiation exposure.     Patients with comorbidities resulting in life expectancy of < 10 years, or that would preclude treatment of an abnormality identified on CT, should not be screened due to lack of benefit.     To obtain maximal benefit from this screening, smoking cessation and long-term abstinence from smoking is critical

## 2022-06-02 ENCOUNTER — TELEPHONE (OUTPATIENT)
Dept: CASE MANAGEMENT | Age: 67
End: 2022-06-02

## 2022-06-29 ENCOUNTER — TELEPHONE (OUTPATIENT)
Dept: CASE MANAGEMENT | Age: 67
End: 2022-06-29

## 2022-06-29 NOTE — TELEPHONE ENCOUNTER
Pt due for annual Lung Screening CT. Missed appt on 6/23. Pt has an active order for the test. Called pt to assist with scheduling. Call transferred to scheduling.

## 2022-07-12 ENCOUNTER — TELEPHONE (OUTPATIENT)
Dept: CASE MANAGEMENT | Age: 67
End: 2022-07-12

## 2022-08-05 ENCOUNTER — HOSPITAL ENCOUNTER (OUTPATIENT)
Dept: CT IMAGING | Age: 67
Discharge: HOME | End: 2022-08-05
Payer: MEDICARE

## 2022-08-05 DIAGNOSIS — Z87.891 PERSONAL HISTORY OF TOBACCO USE: ICD-10-CM

## 2022-08-05 PROCEDURE — 71271 CT THORAX LUNG CANCER SCR C-: CPT

## 2022-08-08 ENCOUNTER — TELEPHONE (OUTPATIENT)
Dept: PULMONOLOGY | Age: 67
End: 2022-08-08

## 2022-08-08 NOTE — RESULT ENCOUNTER NOTE
Please let the patient know that CT chest did not show any concerning findings.   Repeat CT in 1 year

## 2022-08-09 ENCOUNTER — TELEPHONE (OUTPATIENT)
Dept: CASE MANAGEMENT | Age: 67
End: 2022-08-09

## 2022-08-09 NOTE — TELEPHONE ENCOUNTER
Annual lung screen on 8/5/22. LRAD2. Recommended screen in one year. Reviewed by ordering physician and ordering office contacts patient with results. Results letter mailed.  Will follow in the lung screening program.

## 2022-09-02 NOTE — ASSESSMENT & PLAN NOTE
There were no vitals taken for this visit.   Meds~ losartan / metoprolol   Plan~ controlled on current meds

## 2022-09-02 NOTE — ASSESSMENT & PLAN NOTE
Angina~ none  CCS class 1  Intervention  Stress~ 6/25/20 normal stress  [facility]~   [facility]  11/3/2021 PCI to RCA ([facility])  Meghanaes Sos  6/24/20 EF 70% normal function  Current meds~ asa / plavix  Plan~ having rare nose bleeds, she is able to keep them controlled. Will discuss stopping a blood thinner at next visit.

## 2022-09-02 NOTE — PROGRESS NOTES
[facility]   Cardiac Evaluation      Patient: Omar Maya  YOB: 1955         Chief Complaint   Patient presents with    Coronary Artery Disease    Hyperlipidemia    Hypertension    Shortness of Breath    6 Month Follow-Up          Referring provider: Dawood Valentine MD    History of Present Illness:  Omar Maya is a 75 y.o. female here as a follow up for CAD, Htn, alcoholic pancreatitis, and autoimmune hepatitis with transaminitis (following GI). She does have nose bleeds sometimes. 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 Today she reports she was in the hospital for a nose bleed and was told her heart rate is too low on her metoprolol but her blood pressure was high. She said that she tried one of her sister's blood pressure pills (amlodipine she thinks) one day. She reports today she took all medications except metoprolol. She denies any chest pain or exertional shortness of breath. With regard to medication therapy he/she has been compliant with prescribed regimen and has tolerated therapy to date. Past Medical History:   has a past medical history of Brain aneurysm, CAD (coronary artery disease), Cerebral artery occlusion with cerebral infarction ([facility] [address] COPD (chronic obstructive pulmonary disease) ([facility] [address] ETOH abuse, GERD (gastroesophageal reflux disease), Glaucoma, History of blood transfusion, HTN (hypertension), Hx SBO, Hyperlipidemia, Myocardial infarct, old, Pancreatitis, and Shingles. Surgical History:   has a past surgical history that includes Abdomen surgery; US BIOPSY LIVER PERCUTANEOUS (7/2/2020); Colonoscopy (09/2021); Endoscopy, colon, diagnostic (09/2021); Hysterectomy (2001); Dilatation, esophagus; Breast surgery (2009); and Coronary angioplasty with stent (2016).      Current Outpatient Medications   Medication Sig Dispense Refill    fluticasone-umeclidin-vilant (TRELEGY ELLIPTA) 100-62.5-25 MCG/INH AEPB Inhale 1 puff into the lungs daily 60 each 6    albuterol sulfate HFA (PROAIR HFA) 108 (90 Base) MCG/ACT inhaler Inhale 2 puffs into the lungs every 6 hours as needed for Wheezing 18 g 5    ipratropium-albuterol (DUONEB) 0.5-2.5 (3) MG/3ML SOLN nebulizer solution Inhale 3 mLs into the lungs every 4 hours 360 mL 5    clopidogrel (PLAVIX) 75 MG tablet Take 1 tablet by mouth daily 90 tablet 3    atorvastatin (LIPITOR) 80 MG tablet Take 1 tablet by mouth daily 30 tablet 2    losartan (COZAAR) 100 MG tablet Take 1 tablet by mouth daily 30 tablet 2    pantoprazole (PROTONIX) 40 MG tablet Take 1 tablet by mouth 2 times daily (before meals) 30 tablet 3    nitroGLYCERIN (NITROSTAT) 0.4 MG SL tablet Place 1 tablet under the tongue every 5 minutes as needed for Chest pain 25 tablet 3    HYDROcodone-acetaminophen  MG TABS Take 10 mg of opioid by mouth 4 times daily as needed for Pain. Vicodin 10/325 mg every 6 hours as needed      folic acid (FOLVITE) 1 MG tablet Take 1 tablet by mouth daily 30 tablet 3    vitamin B-1 100 MG tablet Take 1 tablet by mouth daily 30 tablet 3    aspirin 81 MG chewable tablet Take 81 mg by mouth daily      gabapentin (NEURONTIN) 300 MG capsule Take 300 mg by mouth 3 times daily. No current facility-administered medications for this visit.        Allergies:  Lisinopril and Dilaudid [hydromorphone hcl]     Social History:  Social History     Socioeconomic History    Marital status: Single     Spouse name: Not on file    Number of children: Not on file    Years of education: Not on file    Highest education level: Not on file   Occupational History    Not on file   Tobacco Use    Smoking status: Former     Packs/day: 1.00     Years: 30.00     Pack years: 30.00     Types: Cigarettes     Quit date: 2016     Years since quittin.7    Smokeless tobacco: Never    Tobacco comments:     documented in the chart that she was a heavy smoker in the past with 30 pack year   Vaping Use    Vaping Use: Never used   Substance and Sexual Activity    Alcohol use: Not Currently    Drug use: Never    Sexual activity: Not on file   Other Topics Concern    Not on file   Social History Narrative    Not on file     Social Determinants of Health     Financial Resource Strain: Not on file   Food Insecurity: Not on file   Transportation Needs: Not on file   Physical Activity: Not on file   Stress: Not on file   Social Connections: Not on file   Intimate Partner Violence: Not on file   Housing Stability: Not on file       Family History:   Family History   Problem Relation Age of Onset    Diabetes Sister      Family history has been reviewed and not pertinent except as noted above. Review of Systems:   Constitutional: there has been no unanticipated weight loss. No change in energy or activity level   Eyes: No visual changes   ENT: No Headaches, hearing loss or vertigo. No mouth sores or sore throat. Cardiovascular: Reviewed in HPI  Respiratory: No cough or wheezing, no sputum production. Gastrointestinal: No abdominal pain, appetite loss, blood in stools. No change in bowel or bladder habits. Genitourinary: No nocturia, dysuria, trouble voiding  Musculoskeletal:  No gait disturbance, weakness or joint complaints. Integumentary: No rash or pruritis. Neurological: No headache, change in muscle strength, numbness or tingling. No change in gait, balance, coordination, mood, affect, memory, mentation, behavior. Psychiatric: No anxiety or depression  Endocrine: No malaise or fever  Hematologic/Lymphatic: No abnormal bruising or bleeding, blood clots or swollen lymph nodes. Allergic/Immunologic: No nasal congestion or hives. Physical Examination:    Vitals:    09/26/22 0824   BP: 112/68   Site: Left Upper Arm   Position: Sitting   Cuff Size: Medium Adult   Pulse: 61   SpO2: 96%   Weight: 155 lb 6.4 oz (70.5 kg)   Height: 5' 6\" (1.676 m)       Body mass index is 25.08 kg/m².      Wt Readings from Last 3 Encounters: 09/26/22 155 lb 6.4 oz (70.5 kg)   05/11/22 154 lb (69.9 kg)   11/23/21 162 lb (73.5 kg)      BP Readings from Last 3 Encounters:   09/26/22 112/68   05/11/22 128/72   03/04/22 118/82        Physical Examination:    CONSTITUTIONAL: Well developed, well nourished  EYES: PERRLA. No xanthelasma, sclera non icteric  EARS,NOSE,MOUTH,THROAT:  Mucous membranes moist, normal hearing  NECK: Supple, JVP normal, thyroid not enlarged. Carotids 2+ without bruits  RESPIRATORY: Normal effort, no rales or rhonchi  CARDIOVASCULAR: Normal PMI, regular rate and rhythm, no murmurs, rub or gallop. No edema. Radial pulses present and equal  CHEST: No scar or masses  ABDOMEN: Normal bowel sounds. No masses or tenderness. No bruit  MUSCULOSKELETAL: No clubbing or cyanosis. Moves all extremities well. Normal gait  SKIN:  Warm and dry. No rashes  NEUROLOGIC: Cranial nerves intact. Alert and oriented  PSYCHIATRIC: Calm affect. Appears to have normal judgement and insight    All testing and labs listed below were personally reviewed by myself. Last Angiogram 11/3/21: Roseline Henley  Findings:  Artery Findings/Result   LM Normal   LAD 20% mid   Cx Patent OM stent   RI N/A   RCA 70% mid X2 (FFR 0.79)   LVEDP 6   LVG 60%      Intervention:         PCI of RCA with 3.0X38 overlapping prox with 3.5X38 Xience SAHIL (All PD with 3.5NC)     Post Cath Dx:       CAD as above      Stress 6/25/20  Impression  Normal LV wall motion and function  Normal myocardial perfusion       Echo 6/24/20  Summary  Normal left ventricle size, wall thickness, and systolic function with an  estimated ejection fraction of 70%. No regional wall motion abnormalities are seen. Normal diastolic function. Avg. E/e'=11.35    Cardiac cath [facility] 9/22/2016:  Coronary angiography:   1. LM is of large caliber and gives rise to the LAD and left   circumflex coronary arteries. It is free of significant disease  2.  LAD system is large caliber system and has diffuse mild disease 20-30% without any significant obstructive lesion. It   gives rise to 2 small caliber diagonal that has moderate to   severe ostial disease. 3. LCx system is large caliber and gives rise to medium caliber   OM1. OM1 has 99% subtotal occlusion followed by sequential 85%   lesion with JANN 2 flow. 4. RCA system is large-caliber system and has mild diffuse   disease with focal lesion in the midsegment about 80% tubular   lesion in between mid and distal segment about 85%. It   bifurcates. Posterior lateral system that has luminal   irregularities. Impression:  Successful PCI to the OM1 99% subtotal occlusion with   drug-eluting senna G2 0.25 x 28 mm stent   Mild disease in LAD   Severe disease in the RCA   Preserved LV Function       Assessment/Plan  1. NSTEMI (non-ST elevated myocardial infarction) ([address]    2. Coronary artery disease due to lipid rich plaque    3. Hypertension, unspecified type    4. Mixed hyperlipidemia      CAD   Angina~ none  CCS class 1  Intervention  Stress~ 6/25/20 normal stress  [facility] 2016- PCI to OM1 ([facility])  [facility] 11/3/2021 PCI to RCA ([place])  [facility]  6/24/20 EF 70% normal function  Current meds~ asa / plavix (recent ER visit for epistaxis)  Plan~ stop Plavix in January '23 (call if nose bleeds continue)    HTN   /68 (Site: Left Upper Arm, Position: Sitting, Cuff Size: Medium Adult)   Pulse 61   Ht 5' 6\" (1.676 m)   Wt 155 lb 6.4 oz (70.5 kg)   SpO2 96%   BMI 25.08 kg/m²    Meds~ losartan / metoprolol   Plan~ d/c metoprolol d/t bradycardia       Hyperlipidemia  1/31/22      TG 87 LDL  93   HDL 57  Meds~ Lipitor   Plan~ repeat lipid/lft around Jan '23     Former tobacco use      Orders Placed This Encounter   Procedures    Lipid, Fasting    Hepatic Function Panel         Follow up in 1 year    Kristina Swain MD      Thank you for allowing to me to participate in the care of Ita Borja. Scribe's Attestation:    This note was scribed in the

## 2022-09-02 NOTE — PATIENT INSTRUCTIONS
Repeat your cholesterol around January '23  OK to stop metoprolol due to low heart rate - your blood pressure looks good today, call us if you notice it is going up. You still have losartan to control the pressure.    OK to stop clopidegrel (Plavix) January 1 2023 - call if you continue to have nose bleeds   Follow up in 1 year

## 2022-09-26 ENCOUNTER — OFFICE VISIT (OUTPATIENT)
Dept: CARDIOLOGY CLINIC | Age: 67
End: 2022-09-26
Payer: MEDICARE

## 2022-09-26 VITALS
WEIGHT: 155.4 LBS | BODY MASS INDEX: 24.98 KG/M2 | SYSTOLIC BLOOD PRESSURE: 112 MMHG | HEART RATE: 61 BPM | DIASTOLIC BLOOD PRESSURE: 68 MMHG | OXYGEN SATURATION: 96 % | HEIGHT: 66 IN

## 2022-09-26 DIAGNOSIS — I25.83 CORONARY ARTERY DISEASE DUE TO LIPID RICH PLAQUE: ICD-10-CM

## 2022-09-26 DIAGNOSIS — I25.10 CORONARY ARTERY DISEASE DUE TO LIPID RICH PLAQUE: ICD-10-CM

## 2022-09-26 DIAGNOSIS — I10 HYPERTENSION, UNSPECIFIED TYPE: ICD-10-CM

## 2022-09-26 DIAGNOSIS — I21.4 NSTEMI (NON-ST ELEVATED MYOCARDIAL INFARCTION) (HCC): Primary | ICD-10-CM

## 2022-09-26 DIAGNOSIS — E78.2 MIXED HYPERLIPIDEMIA: ICD-10-CM

## 2022-09-26 PROCEDURE — 99214 OFFICE O/P EST MOD 30 MIN: CPT | Performed by: INTERNAL MEDICINE

## 2022-09-26 PROCEDURE — 1123F ACP DISCUSS/DSCN MKR DOCD: CPT | Performed by: INTERNAL MEDICINE

## 2022-09-28 ENCOUNTER — OFFICE VISIT (OUTPATIENT)
Dept: PULMONOLOGY | Age: 67
End: 2022-09-28
Payer: MEDICARE

## 2022-09-28 VITALS
DIASTOLIC BLOOD PRESSURE: 62 MMHG | SYSTOLIC BLOOD PRESSURE: 100 MMHG | OXYGEN SATURATION: 98 % | BODY MASS INDEX: 25.02 KG/M2 | WEIGHT: 155 LBS | HEART RATE: 47 BPM

## 2022-09-28 DIAGNOSIS — J44.9 COPD, SEVERE (HCC): Primary | ICD-10-CM

## 2022-09-28 PROCEDURE — 99214 OFFICE O/P EST MOD 30 MIN: CPT | Performed by: INTERNAL MEDICINE

## 2022-09-28 PROCEDURE — 1123F ACP DISCUSS/DSCN MKR DOCD: CPT | Performed by: INTERNAL MEDICINE

## 2022-09-28 NOTE — PROGRESS NOTES
PULMONARY OFFICE FOLLOW UP NOTE    REASON FOR VISIT:   Chief Complaint   Patient presents with    Results    Follow-up       DATE OF VISIT: 9/28/2022    HISTORY OF PRESENT ILLNESS: 75y.o. year old female female smoker is here for follow-up of COPD. She has past medical history of coronary artery disease s/p PCI, hypertension, autoimmune hepatitis on prednisone. Patient's shortness of breath is stable. Her bronchodilator regimen consist of Trelegy Ellipta 1 puff daily. She continues to use albuterol inhaler twice daily. Does not feel the need to use nebulizer. Denies any increased cough wheezing chest pain or hemoptysis. PFT showed severe obstruction with FEV1 35%. CT lung cancer screening did not show any abnormal lung nodules. Previously: Patient stated that she was diagnosed with emphysema 5 years ago. It was at that time she quit smoking. She was complaining of occasional dyspnea on exertion for which she was taking albuterol inhaler as needed. She developed Covid 19 infection in November 2020 for which she was hospitalized overnight and then discharged home. Symptoms resolved quickly except dyspnea on exertion. She gets short of breath and performing daily activities of her life. Symptoms are progressive. There is no cough or chest pain. She does complain of wheezing. Denies any runny nose stuffy nose or postnasal drip. Denies any acid reflux. Patient has been taking Advair inhaler twice a day and Anoro Ellipta every day. Her inhaler technique is poor. Patient also states that she has gained weight while being on prednisone since July 2020 for autoimmune hepatitis. Continue she is on 5 mg of prednisone which is being tapered slowly. DIAGNOSTIC TEST REVIEWED:  I reviewed the PFT from 4/1/2021 and my interpretation is as follows. Severe obstructive airway disease with hyperinflation and reduced diffusion capacity.   FEV1/FVC 42  FEV1 35%, 0.78 L  FVC 66%, 1.87 L TLC 91%, 15.8.  DLCO 22%     CT lung cancer screening performed in 8/8/2022 showed moderate emphysema without any abnormal pulmonary nodules. CT chest lung cancer screening performed on 3/31/2021 did not show any abnormal lung nodules. I personally reviewed and interpreted all images. REVIEW OF SYSTEMS:   CONSTITUTIONAL SYMPTOMS: The patient denies fever, fatigue, night sweats, weight loss or weight gain. HEENT: No vision changes. No tinnitus, Denies sinus pain. No hoarseness, or dysphagia. NECK: Patient denies swelling in the neck. CARDIOVASCULAR: Denies chest pain, palpitation, syncope. RESPIRATORY: See above. GASTROINTESTINAL: Denies nausea, abdominal pain or change in bowel function. GENITOURINARY: Denies obstructive symptoms. No history of incontinence. BREASTS: No masses or lumps in the breasts. SKIN: No rashes or itching. MUSCULOSKELETAL: Denies weakness or bone pain. NEUROLOGICAL: No headaches or seizures. PSYCHIATRIC: Denies mood swings or depression. ENDOCRINE: Denies heat or cold intolerance or excessive thirst.  HEMATOLOGIC/LYMPHATIC: Denies easy bruising or lymph node swelling. ALLERGIC/IMMUNOLOGIC: No environmental allergies.     PAST MEDICAL HISTORY:   Past Medical History:   Diagnosis Date    Brain aneurysm 2003    CAD (coronary artery disease)     Cerebral artery occlusion with cerebral infarction ([facility] [address] 2003    with brain aneurysm    COPD (chronic obstructive pulmonary disease) (HCC)     emphsyma    ETOH abuse     GERD (gastroesophageal reflux disease)     Glaucoma     History of blood transfusion 2003    HTN (hypertension)     Hx SBO     Hyperlipidemia     Myocardial infarct, old 2016    Pancreatitis     Shingles        PAST SURGICAL HISTORY:   Past Surgical History:   Procedure Laterality Date    ABDOMEN SURGERY      BREAST SURGERY  2009    biopsy-benign    COLONOSCOPY  09/2021    CORONARY ANGIOPLASTY WITH STENT PLACEMENT  2016    when she has MI 2016 (x 2)    DILATATION, ESOPHAGUS      small bowel resection  d/t SBO    ENDOSCOPY, COLON, DIAGNOSTIC  2021    HYSTERECTOMY (CERVIX STATUS UNKNOWN)      US GUIDED LIVER BIOPSY PERCUTANEOUS  2020    US GUIDED LIVER BIOPSY PERCUTANEOUS 2020 [facility] ULTRASOUND       SOCIAL HISTORY:   Social History     Tobacco Use    Smoking status: Former     Packs/day: 1.00     Years: 30.00     Pack years: 30.00     Types: Cigarettes     Quit date: 2016     Years since quittin.7    Smokeless tobacco: Never    Tobacco comments:     documented in the chart that she was a heavy smoker in the past with 30 pack year   Vaping Use    Vaping Use: Never used   Substance Use Topics    Alcohol use: Not Currently    Drug use: Never       FAMILY HISTORY:   Family History   Problem Relation Age of Onset    Diabetes Sister        MEDICATIONS:     Current Outpatient Medications on File Prior to Visit   Medication Sig Dispense Refill    fluticasone-umeclidin-vilant (TRELEGY ELLIPTA) 100-62.5-25 MCG/INH AEPB Inhale 1 puff into the lungs daily 60 each 6    albuterol sulfate HFA (PROAIR HFA) 108 (90 Base) MCG/ACT inhaler Inhale 2 puffs into the lungs every 6 hours as needed for Wheezing 18 g 5    ipratropium-albuterol (DUONEB) 0.5-2.5 (3) MG/3ML SOLN nebulizer solution Inhale 3 mLs into the lungs every 4 hours 360 mL 5    clopidogrel (PLAVIX) 75 MG tablet Take 1 tablet by mouth daily 90 tablet 3    atorvastatin (LIPITOR) 80 MG tablet Take 1 tablet by mouth daily 30 tablet 2    losartan (COZAAR) 100 MG tablet Take 1 tablet by mouth daily 30 tablet 2    pantoprazole (PROTONIX) 40 MG tablet Take 1 tablet by mouth 2 times daily (before meals) 30 tablet 3    nitroGLYCERIN (NITROSTAT) 0.4 MG SL tablet Place 1 tablet under the tongue every 5 minutes as needed for Chest pain 25 tablet 3    HYDROcodone-acetaminophen  MG TABS Take 10 mg of opioid by mouth 4 times daily as needed for Pain.  Vicodin 10/325 mg every 6 hours as needed      folic acid (FOLVITE) 1 MG tablet Take 1 tablet by mouth daily 30 tablet 3    vitamin B-1 100 MG tablet Take 1 tablet by mouth daily 30 tablet 3    aspirin 81 MG chewable tablet Take 81 mg by mouth daily      gabapentin (NEURONTIN) 300 MG capsule Take 300 mg by mouth 3 times daily. No current facility-administered medications on file prior to visit. ALLERGIES:   Allergies as of 09/28/2022 - Fully Reviewed 09/28/2022   Allergen Reaction Noted    Lisinopril Swelling 06/25/2020    Dilaudid [hydromorphone hcl] Itching 07/02/2020      OBJECTIVE:   weight is 155 lb (70.3 kg). Her blood pressure is 100/62 and her pulse is 47 (abnormal). Her oxygen saturation is 98%. PHYSICAL EXAM:    CONSTITUTIONAL: She is a 75y.o.-year-old who appears her stated age. She is alert and oriented x 3 and in no acute distress. HEENT: PERRL. No scleral icterus. No thrush, atraumatic, normocephalic. NECK: Supple, without cervical or supraclavicular lymphadenopathy:  CARDIOVASCULAR: S1 S2 RRR. Without murmer  RESPIRATORY & CHEST: Lungs are clear to auscultation and percussion. No wheezing, no crackles. Good air movement  GASTROINTESTINAL & ABDOMEN: Soft, nontender, positive bowel sounds in all quadrants, non-distended, without hepatosplenomegaly. GENITOURINARY: Deferred. MUSCULOSKELETAL: No tenderness to palpation of the axial skeleton. There is no clubbing. No cyanosis. No edema of the lower extremities. SKIN OF BODY: No rash or jaundice. PSYCHIATRIC EVALUATION: Normal affect. Patient answers questions appropriately. HEMATOLOGIC/LYMPHATIC/ IMMUNOLOGIC: No palpable lymphadenopathy. NEUROLOGIC: Alert and oriented x 3. Groslly non-focal. Motor strength is 5+/5 in all muscle groups. The patient has a normal sensorium globally. ASSESSMENT AND PLAN:      1. COPD, severe ([address] FEV1 35%  Symptoms stable. Continue Trelegy Ellipta 1 puff daily  Continue albuterol inhaler and duo nebs as needed.   CT lung screen from August 2022 did not show any abnormal lung nodules. Continue annual low-dose CT. Return in about 6 months (around 3/28/2023). Sil Koehler MD  Pulmonary Critical Care and Sleep Medicine  Electronically signed by Sil Koehler MD on 9/28/2022 at 9:33 AM     This note was completed using dragon medical speech recognition software. Grammatical errors, random word insertions, pronoun errors and incomplete sentences are occasional consequences of this technology due to software limitations. If there are questions or concerns about the content of this note of information contained within the body of this dictation they should be addressed with the provider for clarification.

## 2022-12-11 DIAGNOSIS — J44.9 COPD, SEVERE (HCC): ICD-10-CM

## 2022-12-12 RX ORDER — FLUTICASONE FUROATE, UMECLIDINIUM BROMIDE AND VILANTEROL TRIFENATATE 100; 62.5; 25 UG/1; UG/1; UG/1
POWDER RESPIRATORY (INHALATION)
Qty: 60 EACH | Refills: 4 | Status: SHIPPED | OUTPATIENT
Start: 2022-12-12

## 2023-04-24 ENCOUNTER — OFFICE VISIT (OUTPATIENT)
Dept: PULMONOLOGY | Age: 68
End: 2023-04-24
Payer: MEDICARE

## 2023-04-24 VITALS
DIASTOLIC BLOOD PRESSURE: 65 MMHG | OXYGEN SATURATION: 95 % | HEART RATE: 89 BPM | BODY MASS INDEX: 25.86 KG/M2 | SYSTOLIC BLOOD PRESSURE: 110 MMHG | WEIGHT: 160.2 LBS

## 2023-04-24 DIAGNOSIS — Z87.891 PERSONAL HISTORY OF TOBACCO USE: Primary | ICD-10-CM

## 2023-04-24 DIAGNOSIS — J44.9 COPD, SEVERE (HCC): ICD-10-CM

## 2023-04-24 PROCEDURE — 3078F DIAST BP <80 MM HG: CPT | Performed by: INTERNAL MEDICINE

## 2023-04-24 PROCEDURE — 3074F SYST BP LT 130 MM HG: CPT | Performed by: INTERNAL MEDICINE

## 2023-04-24 PROCEDURE — 99214 OFFICE O/P EST MOD 30 MIN: CPT | Performed by: INTERNAL MEDICINE

## 2023-04-24 PROCEDURE — G0296 VISIT TO DETERM LDCT ELIG: HCPCS | Performed by: INTERNAL MEDICINE

## 2023-04-24 PROCEDURE — 1123F ACP DISCUSS/DSCN MKR DOCD: CPT | Performed by: INTERNAL MEDICINE

## 2023-04-24 RX ORDER — ALBUTEROL SULFATE 90 UG/1
2 AEROSOL, METERED RESPIRATORY (INHALATION) EVERY 6 HOURS PRN
Qty: 18 G | Refills: 6 | Status: SHIPPED | OUTPATIENT
Start: 2023-04-24

## 2023-04-24 NOTE — PATIENT INSTRUCTIONS
follow-up, he or she will help you understand what to do next. After a lung cancer screening, you can go back to your usual activities right away. A lung cancer screening test can't tell if you have lung cancer. If your results are positive, your doctor can't tell whether an abnormal finding is a harmless nodule, cancer, or something else without doing more tests. What can you do to help prevent lung cancer? Some lung cancers can't be prevented. But if you smoke, quitting smoking is the best step you can take to prevent lung cancer. If you want to quit, your doctor can recommend medicines or other ways to help. Follow-up care is a key part of your treatment and safety. Be sure to make and go to all appointments, and call your doctor if you are having problems. It's also a good idea to know your test results and keep a list of the medicines you take. Where can you learn more? Go to http://www.plasencia.com/ and enter Q940 to learn more about \"Learning About Lung Cancer Screening. \"  Current as of: May 4, 2022               Content Version: 13.6  © 7318-5057 [employer]. Care instructions adapted under license by [facility] ([facility]). If you have questions about a medical condition or this instruction, always ask your healthcare professional. [address] any warranty or liability for your use of this information.

## 2023-04-24 NOTE — PROGRESS NOTES
Discussed with the patient the current USPSTF guidelines released March 9, 2021 for screening for lung cancer. For adults aged 48 to [de-identified] years who have a 20 pack-year smoking history and currently smoke or have quit within the past 15 years the grade B recommendation is to:  Screen for lung cancer with low-dose computed tomography (LDCT) every year. Stop screening once a person has not smoked for 15 years or has a health problem that limits life expectancy or the ability to have lung surgery. The patient  reports that she quit smoking about 7 years ago. Her smoking use included cigarettes. She has a 30.00 pack-year smoking history. She has never used smokeless tobacco.. Discussed with patient the risks and benefits of screening, including over-diagnosis, false positive rate, and total radiation exposure. The patient currently exhibits no signs or symptoms suggestive of lung cancer. Discussed with patient the importance of compliance with yearly annual lung cancer screenings and willingness to undergo diagnosis and treatment if screening scan is positive. In addition, the patient was counseled regarding the importance of remaining smoke free and/or total smoking cessation. Also reviewed the following if the patient has Medicare that as of February 10, 2022, Medicare only covers LDCT screening in patients aged 51-72 with at least a 20 pack-year smoking history who currently smoke or have quit in the last 15 years        PULMONARY OFFICE FOLLOW UP NOTE    REASON FOR VISIT:   Chief Complaint   Patient presents with    6 Month Follow-Up       DATE OF VISIT: 4/24/2023    HISTORY OF PRESENT ILLNESS: 75y.o. year old female former smoker is here for follow-up of COPD. She has history of CAD status post PCI, hypertension, autoimmune hepatitis. Patient denies any increased shortness of breath. She is stable dyspnea on exertion. Denies any cough or wheezing or chest pain or hemoptysis.   Patient's

## 2023-05-12 DIAGNOSIS — J44.9 COPD, SEVERE (HCC): ICD-10-CM

## 2023-05-12 RX ORDER — FLUTICASONE FUROATE, UMECLIDINIUM BROMIDE AND VILANTEROL TRIFENATATE 100; 62.5; 25 UG/1; UG/1; UG/1
POWDER RESPIRATORY (INHALATION)
Qty: 60 EACH | Refills: 5 | Status: SHIPPED | OUTPATIENT
Start: 2023-05-12

## 2023-07-07 ENCOUNTER — TELEPHONE (OUTPATIENT)
Dept: CASE MANAGEMENT | Age: 68
End: 2023-07-07

## 2023-07-27 ENCOUNTER — TELEPHONE (OUTPATIENT)
Dept: CARDIOLOGY CLINIC | Age: 68
End: 2023-07-27

## 2023-07-27 DIAGNOSIS — R00.2 PALPITATIONS: ICD-10-CM

## 2023-07-27 DIAGNOSIS — R94.31 ABNORMAL EKG: Primary | ICD-10-CM

## 2023-07-27 NOTE — TELEPHONE ENCOUNTER
Reviewed with BNN. Recommends 7 day event monitor and f/u with NP. If pt is having palpitations during appt complete EKG. Also recommends f/u with pulmonology. Called and discussed with Manuela Jack. V/u.    Event monitor scheduled 8/2/2023  F/u with NPTS schedule 8/16/2023

## 2023-07-27 NOTE — TELEPHONE ENCOUNTER
Pt called stating, they need appt with [address] sooner than scheduled appt 9/15. Pt stated their heart is pounding during the night it waking her up, pt also has increase SOB. Pt denies CP. Pt did not have any vitals at the time of call.     Pls advise thank you   [phone]

## 2023-07-27 NOTE — TELEPHONE ENCOUNTER
Called and spoke with Cinthia Mcwilliams. States she feels a racing sensation in her chest at random times, but most notably when she lays down for bed. Denies chest pain/pressure/squeezing/tightness. Does notice some dizziness lightheadedness when she goes from sitting to standing. States her BP has been \"good. \" Does not have any readings for me at this time. Does notice some increased SOB but she isn't sure if it is from her COPD or not. States when she had prior PCI her symptom was severe and sharp chest pain.

## 2023-08-02 ENCOUNTER — NURSE ONLY (OUTPATIENT)
Dept: CARDIOLOGY CLINIC | Age: 68
End: 2023-08-02

## 2023-08-02 NOTE — PROGRESS NOTES
7 Day E-patch requested for pt. Device was registered and placed. Tutorial given, pt verbalized understanding.  Date 08/02/2023 Time 02:30pm  S/N [id number]  [email or web address]

## 2023-08-23 ENCOUNTER — OFFICE VISIT (OUTPATIENT)
Dept: CARDIOLOGY CLINIC | Age: 68
End: 2023-08-23
Payer: MEDICARE

## 2023-08-23 VITALS
HEART RATE: 48 BPM | HEIGHT: 66 IN | SYSTOLIC BLOOD PRESSURE: 132 MMHG | DIASTOLIC BLOOD PRESSURE: 72 MMHG | BODY MASS INDEX: 25.23 KG/M2 | OXYGEN SATURATION: 97 % | WEIGHT: 157 LBS

## 2023-08-23 DIAGNOSIS — I25.83 CORONARY ARTERY DISEASE DUE TO LIPID RICH PLAQUE: ICD-10-CM

## 2023-08-23 DIAGNOSIS — E78.2 MIXED HYPERLIPIDEMIA: ICD-10-CM

## 2023-08-23 DIAGNOSIS — I10 PRIMARY HYPERTENSION: ICD-10-CM

## 2023-08-23 DIAGNOSIS — I25.10 CORONARY ARTERY DISEASE DUE TO LIPID RICH PLAQUE: ICD-10-CM

## 2023-08-23 DIAGNOSIS — R00.0 RACING HEART BEAT: Primary | ICD-10-CM

## 2023-08-23 PROCEDURE — 3078F DIAST BP <80 MM HG: CPT | Performed by: NURSE PRACTITIONER

## 2023-08-23 PROCEDURE — 3075F SYST BP GE 130 - 139MM HG: CPT | Performed by: NURSE PRACTITIONER

## 2023-08-23 PROCEDURE — 1123F ACP DISCUSS/DSCN MKR DOCD: CPT | Performed by: NURSE PRACTITIONER

## 2023-08-23 PROCEDURE — 99214 OFFICE O/P EST MOD 30 MIN: CPT | Performed by: NURSE PRACTITIONER

## 2023-08-23 RX ORDER — GABAPENTIN 600 MG/1
600 TABLET ORAL 3 TIMES DAILY
COMMUNITY

## 2023-08-23 NOTE — PROGRESS NOTES
[facility]     Outpatient Follow Up Note    Deanne Martinez is 70 y.o. female who presents today with a history of CAD s/p PTCA OM-1 '16, s/p PTCA RCA ', HTN and hyperlipidemia    CHIEF COMPLAINT / HPI:  Follow Up secondary to calling with c/o heart pounding waking her from sleep. Her event monitor showed : sinus rhythm with an average HR 62 bpm; short burst of PAT. Symptoms triggered with sinus rhythm    Subjective:   She recalls her heart beating real fast. She continues to have these episodes but less frequent. They may come on irregardless of activities. She reduced her amt of caffeine intake which as helped. She has no associate symptoms. She denies significant chest pain. There is SOB from her emphysema. The patient denies orthopnea/PND. The patient does not have swelling. The patients weight is stable. The patient is not experiencing dizziness. Her home BP is fairly normal.     These symptoms are stable since the last OV. With regard to medication therapy the patient has been compliant with prescribed regimen. They have tolerated therapy to date.      Past Medical History:   Diagnosis Date    Brain aneurysm     CAD (coronary artery disease)     Cerebral artery occlusion with cerebral infarction ([address])     with brain aneurysm    COPD (chronic obstructive pulmonary disease) (HCC)     emphsyma    ETOH abuse     GERD (gastroesophageal reflux disease)     Glaucoma     History of blood transfusion     HTN (hypertension)     Hx SBO     Hyperlipidemia     Myocardial infarct, old 2016    Pancreatitis     Shingles      Social History:    Social History     Tobacco Use   Smoking Status Former    Packs/day: 1.00    Years: 30.00    Pack years: 30.00    Types: Cigarettes    Quit date: 2016    Years since quittin.6   Smokeless Tobacco Never   Tobacco Comments    documented in the chart that she was a heavy smoker in the past with 30 pack year     Current Medications:  Current

## 2023-09-06 ENCOUNTER — TELEPHONE (OUTPATIENT)
Dept: CASE MANAGEMENT | Age: 68
End: 2023-09-06

## 2023-10-04 ENCOUNTER — TELEPHONE (OUTPATIENT)
Dept: CASE MANAGEMENT | Age: 68
End: 2023-10-04

## 2023-10-04 NOTE — TELEPHONE ENCOUNTER
Pt due for annual Lung Screening CT. Pt has an active order for the test. Called pt to assist with scheduling. Lung screen scheduled for 10/23 at 7:30am with arrival time 30 minutes prior.

## 2023-10-05 DIAGNOSIS — J44.9 COPD, SEVERE (HCC): ICD-10-CM

## 2023-10-06 RX ORDER — FLUTICASONE FUROATE, UMECLIDINIUM BROMIDE AND VILANTEROL TRIFENATATE 100; 62.5; 25 UG/1; UG/1; UG/1
POWDER RESPIRATORY (INHALATION)
Qty: 3 EACH | Refills: 3 | Status: SHIPPED | OUTPATIENT
Start: 2023-10-06

## 2023-11-11 ENCOUNTER — HOSPITAL ENCOUNTER (OUTPATIENT)
Dept: CT IMAGING | Age: 68
Discharge: HOME | End: 2023-11-11
Payer: MEDICARE

## 2023-11-11 DIAGNOSIS — Z87.891 PERSONAL HISTORY OF TOBACCO USE: ICD-10-CM

## 2023-11-11 PROCEDURE — 71271 CT THORAX LUNG CANCER SCR C-: CPT

## 2023-11-13 ENCOUNTER — TELEPHONE (OUTPATIENT)
Dept: CASE MANAGEMENT | Age: 68
End: 2023-11-13

## 2023-11-13 NOTE — TELEPHONE ENCOUNTER
Annual lung screen on 11/11/23. LRAD2. Recommended screen in one year. Results letter mailed.  Will follow in the lung screening program.

## 2024-01-15 ENCOUNTER — OFFICE VISIT (OUTPATIENT)
Dept: PULMONOLOGY | Age: 69
End: 2024-01-15
Payer: MEDICARE

## 2024-01-15 VITALS
OXYGEN SATURATION: 95 % | HEART RATE: 70 BPM | BODY MASS INDEX: 25.57 KG/M2 | WEIGHT: 158.4 LBS | DIASTOLIC BLOOD PRESSURE: 70 MMHG | SYSTOLIC BLOOD PRESSURE: 120 MMHG

## 2024-01-15 DIAGNOSIS — Z87.891 PERSONAL HISTORY OF TOBACCO USE: ICD-10-CM

## 2024-01-15 DIAGNOSIS — J44.9 COPD, SEVERE (HCC): Primary | ICD-10-CM

## 2024-01-15 PROCEDURE — 99214 OFFICE O/P EST MOD 30 MIN: CPT | Performed by: INTERNAL MEDICINE

## 2024-01-15 PROCEDURE — 3078F DIAST BP <80 MM HG: CPT | Performed by: INTERNAL MEDICINE

## 2024-01-15 PROCEDURE — 3074F SYST BP LT 130 MM HG: CPT | Performed by: INTERNAL MEDICINE

## 2024-01-15 PROCEDURE — 1123F ACP DISCUSS/DSCN MKR DOCD: CPT | Performed by: INTERNAL MEDICINE

## 2024-01-15 RX ORDER — ALBUTEROL SULFATE 90 UG/1
2 AEROSOL, METERED RESPIRATORY (INHALATION) EVERY 6 HOURS PRN
Qty: 18 G | Refills: 6 | Status: SHIPPED | OUTPATIENT
Start: 2024-01-15

## 2024-01-15 NOTE — PROGRESS NOTES
Place 1 tablet under the tongue every 5 minutes as needed for Chest pain (Patient not taking: Reported on 8/23/2023) 25 tablet 3     No current facility-administered medications on file prior to visit.               ALLERGIES:   Allergies as of 01/15/2024 - Fully Reviewed 01/15/2024   Allergen Reaction Noted    Lisinopril Swelling 06/25/2020    Dilaudid [hydromorphone hcl] Itching 07/02/2020      OBJECTIVE:   weight is 71.8 kg (158 lb 6.4 oz). Her blood pressure is 120/70 and her pulse is 70. Her oxygen saturation is 95%.       PHYSICAL EXAM:    CONSTITUTIONAL: She is a 68 y.o.-year-old who appears her stated age. She is alert and oriented x 3 and in no acute distress.   HEENT: PERRL. No scleral icterus. No thrush, atraumatic, normocephalic.  NECK: Supple, without cervical or supraclavicular lymphadenopathy:  CARDIOVASCULAR: S1 S2 RRR. Without murmer  RESPIRATORY & CHEST: Lungs are clear to auscultation and percussion. No wheezing, no crackles. Good air movement  GASTROINTESTINAL & ABDOMEN: Soft, nontender, positive bowel sounds in all quadrants, non-distended, without hepatosplenomegaly.   GENITOURINARY: Deferred.   MUSCULOSKELETAL: No tenderness to palpation of the axial skeleton. There is no clubbing. No cyanosis. No edema of the lower extremities.   SKIN OF BODY: No rash or jaundice.   PSYCHIATRIC EVALUATION: Normal affect. Patient answers questions appropriately.   HEMATOLOGIC/LYMPHATIC/ IMMUNOLOGIC: No palpable lymphadenopathy.  NEUROLOGIC: Alert and oriented x 3.Groslly non-focal. Motor strength is 5+/5 in all muscle groups. The patient has a normal sensorium globally.        ASSESSMENT AND PLAN:     1. COPD, severe (HCC)  Stable symptoms  Continue Trelegy Ellipta 1 puff daily  Continue albuterol nailer and DuoNebs as needed  - albuterol sulfate HFA (VENTOLIN HFA) 108 (90 Base) MCG/ACT inhaler; Inhale 2 puffs into the lungs every 6 hours as needed for Wheezing  Dispense: 18 g; Refill: 6    2. Personal history

## 2024-03-25 ENCOUNTER — HOSPITAL ENCOUNTER (OUTPATIENT)
Age: 69
Discharge: HOME | End: 2024-03-25
Payer: MEDICARE

## 2024-03-25 LAB
ALBUMIN SERPL-MCNC: 3.6 G/DL (ref 3.4–5)
ALP SERPL-CCNC: 82 U/L (ref 40–129)
ALT SERPL-CCNC: 30 U/L (ref 10–40)
ANION GAP SERPL CALCULATED.3IONS-SCNC: 8 MMOL/L (ref 3–16)
AST SERPL-CCNC: 29 U/L (ref 15–37)
BASOPHILS # BLD: 0.1 K/UL (ref 0–0.2)
BASOPHILS NFR BLD: 0.7 %
BILIRUB DIRECT SERPL-MCNC: <0.2 MG/DL (ref 0–0.3)
BILIRUB INDIRECT SERPL-MCNC: NORMAL MG/DL (ref 0–1)
BILIRUB SERPL-MCNC: 0.7 MG/DL (ref 0–1)
BUN SERPL-MCNC: 9 MG/DL (ref 7–20)
CALCIUM SERPL-MCNC: 9.7 MG/DL (ref 8.3–10.6)
CHLORIDE SERPL-SCNC: 104 MMOL/L (ref 99–110)
CO2 SERPL-SCNC: 26 MMOL/L (ref 21–32)
CREAT SERPL-MCNC: 0.5 MG/DL (ref 0.6–1.2)
DEPRECATED RDW RBC AUTO: 13.8 % (ref 12.4–15.4)
EOSINOPHIL # BLD: 0.1 K/UL (ref 0–0.6)
EOSINOPHIL NFR BLD: 1.9 %
GFR SERPLBLD CREATININE-BSD FMLA CKD-EPI: >90 ML/MIN/{1.73_M2}
GLUCOSE SERPL-MCNC: 88 MG/DL (ref 70–99)
HCT VFR BLD AUTO: 37.3 % (ref 36–48)
HGB BLD-MCNC: 12.4 G/DL (ref 12–16)
INR PPP: 1.06 (ref 0.84–1.16)
LYMPHOCYTES # BLD: 2.6 K/UL (ref 1–5.1)
LYMPHOCYTES NFR BLD: 33.9 %
MCH RBC QN AUTO: 31 PG (ref 26–34)
MCHC RBC AUTO-ENTMCNC: 33.3 G/DL (ref 31–36)
MCV RBC AUTO: 93.1 FL (ref 80–100)
MONOCYTES # BLD: 0.6 K/UL (ref 0–1.3)
MONOCYTES NFR BLD: 8.2 %
NEUTROPHILS # BLD: 4.2 K/UL (ref 1.7–7.7)
NEUTROPHILS NFR BLD: 55.3 %
PLATELET # BLD AUTO: 300 K/UL (ref 135–450)
PMV BLD AUTO: 8.5 FL (ref 5–10.5)
POTASSIUM SERPL-SCNC: 4 MMOL/L (ref 3.5–5.1)
PROT SERPL-MCNC: 8 G/DL (ref 6.4–8.2)
PROTHROMBIN TIME: 13.8 SEC (ref 11.5–14.8)
RBC # BLD AUTO: 4 M/UL (ref 4–5.2)
SODIUM SERPL-SCNC: 138 MMOL/L (ref 136–145)
WBC # BLD AUTO: 7.5 K/UL (ref 4–11)

## 2024-03-25 PROCEDURE — 82105 ALPHA-FETOPROTEIN SERUM: CPT

## 2024-03-25 PROCEDURE — 36415 COLL VENOUS BLD VENIPUNCTURE: CPT

## 2024-03-25 PROCEDURE — 85025 COMPLETE CBC W/AUTO DIFF WBC: CPT

## 2024-03-25 PROCEDURE — 80048 BASIC METABOLIC PNL TOTAL CA: CPT

## 2024-03-25 PROCEDURE — 85610 PROTHROMBIN TIME: CPT

## 2024-03-25 PROCEDURE — 80076 HEPATIC FUNCTION PANEL: CPT

## 2024-03-27 LAB — AFP-TM SERPL-MCNC: 1.9 UG/L

## 2024-07-24 ENCOUNTER — OFFICE VISIT (OUTPATIENT)
Dept: PULMONOLOGY | Age: 69
End: 2024-07-24
Payer: MEDICARE

## 2024-07-24 VITALS
BODY MASS INDEX: 24.46 KG/M2 | SYSTOLIC BLOOD PRESSURE: 116 MMHG | OXYGEN SATURATION: 95 % | HEIGHT: 66 IN | DIASTOLIC BLOOD PRESSURE: 82 MMHG | HEART RATE: 95 BPM | WEIGHT: 152.2 LBS

## 2024-07-24 DIAGNOSIS — J44.9 COPD, SEVERE (HCC): Primary | ICD-10-CM

## 2024-07-24 DIAGNOSIS — Z87.891 PERSONAL HISTORY OF TOBACCO USE: ICD-10-CM

## 2024-07-24 PROCEDURE — 3079F DIAST BP 80-89 MM HG: CPT | Performed by: INTERNAL MEDICINE

## 2024-07-24 PROCEDURE — 99214 OFFICE O/P EST MOD 30 MIN: CPT | Performed by: INTERNAL MEDICINE

## 2024-07-24 PROCEDURE — 1123F ACP DISCUSS/DSCN MKR DOCD: CPT | Performed by: INTERNAL MEDICINE

## 2024-07-24 PROCEDURE — 3074F SYST BP LT 130 MM HG: CPT | Performed by: INTERNAL MEDICINE

## 2024-07-24 PROCEDURE — G0296 VISIT TO DETERM LDCT ELIG: HCPCS | Performed by: INTERNAL MEDICINE

## 2024-07-24 RX ORDER — LATANOPROST 50 UG/ML
1 SOLUTION/ DROPS OPHTHALMIC 3 TIMES DAILY
COMMUNITY

## 2024-07-24 NOTE — PROGRESS NOTES
treatment if screening scan is positive.  In addition, the patient was counseled regarding the importance of remaining smoke free and/or total smoking cessation.    Also reviewed the following if the patient has Medicare that as of February 10, 2022, Medicare only covers LDCT screening in patients aged 50-77 with at least a 20 pack-year smoking history who currently smoke or have quit in the last 15 years

## 2024-08-04 ENCOUNTER — HOSPITAL ENCOUNTER (OUTPATIENT)
Age: 69
Setting detail: OBSERVATION
Discharge: HOME | End: 2024-08-05
Attending: EMERGENCY MEDICINE | Admitting: INTERNAL MEDICINE
Payer: MEDICARE

## 2024-08-04 ENCOUNTER — APPOINTMENT (OUTPATIENT)
Dept: GENERAL RADIOLOGY | Age: 69
End: 2024-08-04
Payer: MEDICARE

## 2024-08-04 DIAGNOSIS — R07.9 CHEST PAIN, UNSPECIFIED TYPE: ICD-10-CM

## 2024-08-04 DIAGNOSIS — R07.9 ACUTE CHEST PAIN: Primary | ICD-10-CM

## 2024-08-04 DIAGNOSIS — R00.2 PALPITATIONS: ICD-10-CM

## 2024-08-04 DIAGNOSIS — R00.2 PALPITATION: ICD-10-CM

## 2024-08-04 DIAGNOSIS — Z86.79 HISTORY OF CORONARY ARTERY DISEASE: ICD-10-CM

## 2024-08-04 PROBLEM — J43.9 EMPHYSEMA LUNG (HCC): Status: ACTIVE | Noted: 2020-06-26

## 2024-08-04 LAB
ANION GAP SERPL CALCULATED.3IONS-SCNC: 8 MMOL/L (ref 3–16)
BACTERIA URNS QL MICRO: NORMAL /HPF
BASOPHILS # BLD: 0 K/UL (ref 0–0.2)
BASOPHILS NFR BLD: 0.5 %
BILIRUB UR QL STRIP.AUTO: NEGATIVE
BUN SERPL-MCNC: 8 MG/DL (ref 7–20)
CALCIUM SERPL-MCNC: 9.4 MG/DL (ref 8.3–10.6)
CHLORIDE SERPL-SCNC: 108 MMOL/L (ref 99–110)
CHOLEST SERPL-MCNC: 115 MG/DL (ref 0–199)
CLARITY UR: CLEAR
CO2 SERPL-SCNC: 27 MMOL/L (ref 21–32)
COLOR UR: YELLOW
CREAT SERPL-MCNC: <0.5 MG/DL (ref 0.6–1.2)
DEPRECATED RDW RBC AUTO: 14.6 % (ref 12.4–15.4)
EOSINOPHIL # BLD: 0.1 K/UL (ref 0–0.6)
EOSINOPHIL NFR BLD: 1.7 %
EPI CELLS #/AREA URNS AUTO: 2 /HPF (ref 0–5)
GFR SERPLBLD CREATININE-BSD FMLA CKD-EPI: >90 ML/MIN/{1.73_M2}
GLUCOSE SERPL-MCNC: 85 MG/DL (ref 70–99)
GLUCOSE UR STRIP.AUTO-MCNC: NEGATIVE MG/DL
HCT VFR BLD AUTO: 37.8 % (ref 36–48)
HDLC SERPL-MCNC: 63 MG/DL (ref 40–60)
HGB BLD-MCNC: 12.3 G/DL (ref 12–16)
HGB UR QL STRIP.AUTO: NEGATIVE
HYALINE CASTS #/AREA URNS AUTO: 0 /LPF (ref 0–8)
KETONES UR STRIP.AUTO-MCNC: NEGATIVE MG/DL
LDLC SERPL CALC-MCNC: 41 MG/DL
LEUKOCYTE ESTERASE UR QL STRIP.AUTO: ABNORMAL
LYMPHOCYTES # BLD: 2.3 K/UL (ref 1–5.1)
LYMPHOCYTES NFR BLD: 41.8 %
MAGNESIUM SERPL-MCNC: 1.7 MG/DL (ref 1.8–2.4)
MCH RBC QN AUTO: 30.6 PG (ref 26–34)
MCHC RBC AUTO-ENTMCNC: 32.6 G/DL (ref 31–36)
MCV RBC AUTO: 93.9 FL (ref 80–100)
MONOCYTES # BLD: 0.6 K/UL (ref 0–1.3)
MONOCYTES NFR BLD: 11.8 %
NEUTROPHILS # BLD: 2.4 K/UL (ref 1.7–7.7)
NEUTROPHILS NFR BLD: 44.2 %
NITRITE UR QL STRIP.AUTO: NEGATIVE
PH UR STRIP.AUTO: 5.5 [PH] (ref 5–8)
PLATELET # BLD AUTO: 261 K/UL (ref 135–450)
PMV BLD AUTO: 7.8 FL (ref 5–10.5)
POTASSIUM SERPL-SCNC: 3.8 MMOL/L (ref 3.5–5.1)
PROT UR STRIP.AUTO-MCNC: NEGATIVE MG/DL
RBC # BLD AUTO: 4.02 M/UL (ref 4–5.2)
RBC CLUMPS #/AREA URNS AUTO: 2 /HPF (ref 0–4)
SODIUM SERPL-SCNC: 143 MMOL/L (ref 136–145)
SP GR UR STRIP.AUTO: 1.02 (ref 1–1.03)
T4 FREE SERPL-MCNC: 1.3 NG/DL (ref 0.9–1.8)
TRIGL SERPL-MCNC: 56 MG/DL (ref 0–150)
TROPONIN, HIGH SENSITIVITY: <6 NG/L (ref 0–14)
TSH SERPL DL<=0.005 MIU/L-ACNC: 6.02 UIU/ML (ref 0.27–4.2)
UA COMPLETE W REFLEX CULTURE PNL UR: ABNORMAL
UA DIPSTICK W REFLEX MICRO PNL UR: YES
URN SPEC COLLECT METH UR: ABNORMAL
UROBILINOGEN UR STRIP-ACNC: 1 E.U./DL
VLDLC SERPL CALC-MCNC: 11 MG/DL
WBC # BLD AUTO: 5.4 K/UL (ref 4–11)
WBC #/AREA URNS AUTO: 5 /HPF (ref 0–5)

## 2024-08-04 PROCEDURE — 84484 ASSAY OF TROPONIN QUANT: CPT

## 2024-08-04 PROCEDURE — 84439 ASSAY OF FREE THYROXINE: CPT

## 2024-08-04 PROCEDURE — G0378 HOSPITAL OBSERVATION PER HR: HCPCS

## 2024-08-04 PROCEDURE — 96372 THER/PROPH/DIAG INJ SC/IM: CPT

## 2024-08-04 PROCEDURE — 80061 LIPID PANEL: CPT

## 2024-08-04 PROCEDURE — 80048 BASIC METABOLIC PNL TOTAL CA: CPT

## 2024-08-04 PROCEDURE — 84443 ASSAY THYROID STIM HORMONE: CPT

## 2024-08-04 PROCEDURE — 94761 N-INVAS EAR/PLS OXIMETRY MLT: CPT

## 2024-08-04 PROCEDURE — 99285 EMERGENCY DEPT VISIT HI MDM: CPT

## 2024-08-04 PROCEDURE — 93005 ELECTROCARDIOGRAM TRACING: CPT | Performed by: EMERGENCY MEDICINE

## 2024-08-04 PROCEDURE — 85025 COMPLETE CBC W/AUTO DIFF WBC: CPT

## 2024-08-04 PROCEDURE — 6370000000 HC RX 637 (ALT 250 FOR IP): Performed by: EMERGENCY MEDICINE

## 2024-08-04 PROCEDURE — 71046 X-RAY EXAM CHEST 2 VIEWS: CPT

## 2024-08-04 PROCEDURE — 36415 COLL VENOUS BLD VENIPUNCTURE: CPT

## 2024-08-04 PROCEDURE — 81001 URINALYSIS AUTO W/SCOPE: CPT

## 2024-08-04 PROCEDURE — 94640 AIRWAY INHALATION TREATMENT: CPT

## 2024-08-04 PROCEDURE — 96365 THER/PROPH/DIAG IV INF INIT: CPT

## 2024-08-04 PROCEDURE — 6360000002 HC RX W HCPCS: Performed by: EMERGENCY MEDICINE

## 2024-08-04 PROCEDURE — 83735 ASSAY OF MAGNESIUM: CPT

## 2024-08-04 PROCEDURE — 6360000002 HC RX W HCPCS: Performed by: INTERNAL MEDICINE

## 2024-08-04 PROCEDURE — 6370000000 HC RX 637 (ALT 250 FOR IP): Performed by: INTERNAL MEDICINE

## 2024-08-04 RX ORDER — BUDESONIDE AND FORMOTEROL FUMARATE DIHYDRATE 160; 4.5 UG/1; UG/1
2 AEROSOL RESPIRATORY (INHALATION)
Status: DISCONTINUED | OUTPATIENT
Start: 2024-08-04 | End: 2024-08-05 | Stop reason: HOSPADM

## 2024-08-04 RX ORDER — MAGNESIUM SULFATE IN WATER 40 MG/ML
2000 INJECTION, SOLUTION INTRAVENOUS ONCE
Status: DISCONTINUED | OUTPATIENT
Start: 2024-08-04 | End: 2024-08-04

## 2024-08-04 RX ORDER — ATORVASTATIN CALCIUM 80 MG/1
80 TABLET, FILM COATED ORAL NIGHTLY
Status: DISCONTINUED | OUTPATIENT
Start: 2024-08-04 | End: 2024-08-05 | Stop reason: HOSPADM

## 2024-08-04 RX ORDER — FOLIC ACID 1 MG/1
1 TABLET ORAL DAILY
Status: DISCONTINUED | OUTPATIENT
Start: 2024-08-04 | End: 2024-08-05 | Stop reason: HOSPADM

## 2024-08-04 RX ORDER — GABAPENTIN 300 MG/1
600 CAPSULE ORAL 3 TIMES DAILY
Status: DISCONTINUED | OUTPATIENT
Start: 2024-08-04 | End: 2024-08-05 | Stop reason: HOSPADM

## 2024-08-04 RX ORDER — ASPIRIN 81 MG/1
243 TABLET, CHEWABLE ORAL ONCE
Status: COMPLETED | OUTPATIENT
Start: 2024-08-04 | End: 2024-08-04

## 2024-08-04 RX ORDER — ALBUTEROL SULFATE 90 UG/1
2 AEROSOL, METERED RESPIRATORY (INHALATION) EVERY 6 HOURS PRN
Status: DISCONTINUED | OUTPATIENT
Start: 2024-08-04 | End: 2024-08-04

## 2024-08-04 RX ORDER — HYDROCODONE BITARTRATE AND ACETAMINOPHEN 10; 325 MG/1; MG/1
1 TABLET ORAL 4 TIMES DAILY PRN
Status: DISCONTINUED | OUTPATIENT
Start: 2024-08-04 | End: 2024-08-05 | Stop reason: HOSPADM

## 2024-08-04 RX ORDER — PANTOPRAZOLE SODIUM 40 MG/1
40 TABLET, DELAYED RELEASE ORAL
Status: DISCONTINUED | OUTPATIENT
Start: 2024-08-04 | End: 2024-08-05 | Stop reason: HOSPADM

## 2024-08-04 RX ORDER — LOSARTAN POTASSIUM 100 MG/1
100 TABLET ORAL DAILY
Status: DISCONTINUED | OUTPATIENT
Start: 2024-08-04 | End: 2024-08-05 | Stop reason: HOSPADM

## 2024-08-04 RX ORDER — REGADENOSON 0.08 MG/ML
0.4 INJECTION, SOLUTION INTRAVENOUS
Status: COMPLETED | OUTPATIENT
Start: 2024-08-04 | End: 2024-08-05

## 2024-08-04 RX ORDER — ASPIRIN 81 MG/1
81 TABLET, CHEWABLE ORAL DAILY
Status: DISCONTINUED | OUTPATIENT
Start: 2024-08-05 | End: 2024-08-05 | Stop reason: HOSPADM

## 2024-08-04 RX ORDER — ALBUTEROL SULFATE 90 UG/1
2 AEROSOL, METERED RESPIRATORY (INHALATION) 2 TIMES DAILY
Status: DISCONTINUED | OUTPATIENT
Start: 2024-08-04 | End: 2024-08-05 | Stop reason: HOSPADM

## 2024-08-04 RX ORDER — ENOXAPARIN SODIUM 100 MG/ML
40 INJECTION SUBCUTANEOUS DAILY
Status: DISCONTINUED | OUTPATIENT
Start: 2024-08-04 | End: 2024-08-05 | Stop reason: HOSPADM

## 2024-08-04 RX ORDER — MAGNESIUM SULFATE 1 G/100ML
1000 INJECTION INTRAVENOUS ONCE
Status: COMPLETED | OUTPATIENT
Start: 2024-08-04 | End: 2024-08-04

## 2024-08-04 RX ORDER — LATANOPROST 50 UG/ML
1 SOLUTION/ DROPS OPHTHALMIC NIGHTLY
Status: DISCONTINUED | OUTPATIENT
Start: 2024-08-04 | End: 2024-08-05 | Stop reason: HOSPADM

## 2024-08-04 RX ORDER — IPRATROPIUM BROMIDE AND ALBUTEROL SULFATE 2.5; .5 MG/3ML; MG/3ML
1 SOLUTION RESPIRATORY (INHALATION) EVERY 4 HOURS PRN
Status: DISCONTINUED | OUTPATIENT
Start: 2024-08-04 | End: 2024-08-04

## 2024-08-04 RX ORDER — NITROGLYCERIN 0.4 MG/1
0.4 TABLET SUBLINGUAL EVERY 5 MIN PRN
Status: DISCONTINUED | OUTPATIENT
Start: 2024-08-04 | End: 2024-08-05 | Stop reason: HOSPADM

## 2024-08-04 RX ORDER — GAUZE BANDAGE 2" X 2"
100 BANDAGE TOPICAL DAILY
Status: DISCONTINUED | OUTPATIENT
Start: 2024-08-04 | End: 2024-08-05 | Stop reason: HOSPADM

## 2024-08-04 RX ADMIN — FOLIC ACID 1 MG: 1 TABLET ORAL at 13:27

## 2024-08-04 RX ADMIN — GABAPENTIN 600 MG: 300 CAPSULE ORAL at 20:10

## 2024-08-04 RX ADMIN — GABAPENTIN 600 MG: 300 CAPSULE ORAL at 13:26

## 2024-08-04 RX ADMIN — Medication 100 MG: at 13:26

## 2024-08-04 RX ADMIN — MAGNESIUM SULFATE HEPTAHYDRATE 1000 MG: 1 INJECTION, SOLUTION INTRAVENOUS at 09:32

## 2024-08-04 RX ADMIN — ENOXAPARIN SODIUM 40 MG: 40 INJECTION SUBCUTANEOUS at 15:33

## 2024-08-04 RX ADMIN — LOSARTAN POTASSIUM 100 MG: 100 TABLET, FILM COATED ORAL at 12:01

## 2024-08-04 RX ADMIN — ATORVASTATIN CALCIUM 80 MG: 80 TABLET, FILM COATED ORAL at 20:10

## 2024-08-04 RX ADMIN — ASPIRIN 243 MG: 81 TABLET, CHEWABLE ORAL at 10:55

## 2024-08-04 RX ADMIN — HYDROCODONE BITARTRATE AND ACETAMINOPHEN 1 TABLET: 10; 325 TABLET ORAL at 12:00

## 2024-08-04 RX ADMIN — Medication 2 PUFF: at 21:48

## 2024-08-04 RX ADMIN — LATANOPROST 1 DROP: 50 SOLUTION OPHTHALMIC at 20:10

## 2024-08-04 ASSESSMENT — LIFESTYLE VARIABLES
HOW MANY STANDARD DRINKS CONTAINING ALCOHOL DO YOU HAVE ON A TYPICAL DAY: PATIENT DOES NOT DRINK
HOW MANY STANDARD DRINKS CONTAINING ALCOHOL DO YOU HAVE ON A TYPICAL DAY: PATIENT DOES NOT DRINK
HOW OFTEN DO YOU HAVE A DRINK CONTAINING ALCOHOL: NEVER
HOW OFTEN DO YOU HAVE A DRINK CONTAINING ALCOHOL: NEVER

## 2024-08-04 ASSESSMENT — PAIN SCALES - GENERAL: PAINLEVEL_OUTOF10: 4

## 2024-08-04 ASSESSMENT — PAIN - FUNCTIONAL ASSESSMENT: PAIN_FUNCTIONAL_ASSESSMENT: NONE - DENIES PAIN

## 2024-08-04 NOTE — PROGRESS NOTES
How does patient ambulate?   []Low Fall Risk (ambulates by themselves without support)  [x]Stand by assist   []Contact Guard   []Front wheel walker  []Wheelchair   []Steady  []Bed bound  []History of Lower Extremity Amputation  []Unknown, did not assess in the emergency department   How does patient take pills?  [x]Whole with Water  []Crushed in applesauce  []Crushed in pudding  []Other  []Unknown no oral medications were given in the ED  Is patient alert?   [x]Alert  []Drowsy but responds to voice  []Doesn't respond to voice but responds to painful stimuli  []Unresponsive  Is patient oriented?   [x]To person  [x]To place  [x]To time  [x]To situation  []Confused  []Agitated  []Follows commands  If patient is disoriented or from a Skill Nursing Facility has family been notified of admission?   []Yes   [x]No  Patient belongings?   [x]Cell phone  [x]Wallet   []Dentures  [x]Clothing  Any specific patient or family belongings/needs/dynamics?     Miscellaneous comments/pending orders?      If there are any additional questions please reach out to the Emergency Department.

## 2024-08-04 NOTE — RT PROTOCOL NOTE
using Per Protocol order mode.        4-6 - enter or revise RT Bronchodilator order(s) to two equivalent RT bronchodilator orders with one order with BID Frequency and one order with Frequency of every 4 hours PRN wheezing or increased work of breathing using Per Protocol order mode.        7-10 - enter or revise RT Bronchodilator order(s) to two equivalent RT bronchodilator orders with one order with TID Frequency and one order with Frequency of every 4 hours PRN wheezing or increased work of breathing using Per Protocol order mode.       11-13 - enter or revise RT Bronchodilator order(s) to one equivalent RT bronchodilator order with QID Frequency and an Albuterol order with Frequency of every 4 hours PRN wheezing or increased work of breathing using Per Protocol order mode.      Greater than 13 - enter or revise RT Bronchodilator order(s) to one equivalent RT bronchodilator order with every 4 hours Frequency and an Albuterol order with Frequency of every 2 hours PRN wheezing or increased work of breathing using Per Protocol order mode.     RT to enter RT Home Evaluation for COPD & MDI Assessment order using Per Protocol order mode.    Electronically signed by Jay Rico RCP on 8/4/2024 at 12:06 PM

## 2024-08-04 NOTE — ED PROVIDER NOTES
EMERGENCY DEPARTMENT PROVIDER NOTE    Patient Identification  Pt Name: Riri Price  MRN: [id number]  Birthdate 1955  Date of evaluation: 8/4/2024  Provider: Kamlesh Mclaughlin DO  PCP: Bettye Saldaña MD    Chief Complaint  Palpitations (Pt arrives from home. Pt C/O palpitations for 2 months. Pt states that her palpitations come and go and it has progressively getting worse. Pt denies any pain. Hx of cardiac arrest for 8 years ago and 2 stents.)      HPI  (History provided by patient)  This is a 69 y.o. female with pertinent past medical history of coronary artery disease, hypertension who was brought in by self for palpitations.  Patient reports sensation of rapid heartbeat ongoing off and on for the past 2 months.  Symptoms seem to occur most often in the mornings, nothing seems to acutely make the symptoms any better or worse.  Denies any history of similar.  Yesterday patient reports had episode of central pressure-like chest pain which also resolved spontaneously.  She denies any fevers, chills or shortness of breath..       I have reviewed the following nursing documentation:  Allergies: Lisinopril and Dilaudid [hydromorphone hcl]    Past medical history:   Past Medical History:   Diagnosis Date    Brain aneurysm 2003    CAD (coronary artery disease)     Cerebral artery occlusion with cerebral infarction (HCC) 2003    with brain aneurysm    COPD (chronic obstructive pulmonary disease) (HCC)     emphsyma    ETOH abuse     GERD (gastroesophageal reflux disease)     Glaucoma     History of blood transfusion 2003    HTN (hypertension)     Hx SBO     Hyperlipidemia     Myocardial infarct, old 2016    Pancreatitis     Shingles      Past surgical history:   Past Surgical History:   Procedure Laterality Date    ABDOMEN SURGERY      BREAST SURGERY  2009    biopsy-benign    COLONOSCOPY  09/2021    CORONARY ANGIOPLASTY WITH STENT PLACEMENT  2016    when she has MI 2016 (x 2)    DILATATION, ESOPHAGUS

## 2024-08-05 ENCOUNTER — APPOINTMENT (OUTPATIENT)
Age: 69
End: 2024-08-05
Attending: INTERNAL MEDICINE
Payer: MEDICARE

## 2024-08-05 VITALS
HEART RATE: 51 BPM | WEIGHT: 155 LBS | SYSTOLIC BLOOD PRESSURE: 176 MMHG | OXYGEN SATURATION: 97 % | DIASTOLIC BLOOD PRESSURE: 82 MMHG | TEMPERATURE: 97.3 F | BODY MASS INDEX: 24.91 KG/M2 | RESPIRATION RATE: 16 BRPM | HEIGHT: 66 IN

## 2024-08-05 LAB
ANION GAP SERPL CALCULATED.3IONS-SCNC: 10 MMOL/L (ref 3–16)
BASOPHILS # BLD: 0 K/UL (ref 0–0.2)
BASOPHILS NFR BLD: 0.6 %
BUN SERPL-MCNC: 8 MG/DL (ref 7–20)
CALCIUM SERPL-MCNC: 9.4 MG/DL (ref 8.3–10.6)
CHLORIDE SERPL-SCNC: 105 MMOL/L (ref 99–110)
CO2 SERPL-SCNC: 24 MMOL/L (ref 21–32)
CREAT SERPL-MCNC: <0.5 MG/DL (ref 0.6–1.2)
DEPRECATED RDW RBC AUTO: 14.7 % (ref 12.4–15.4)
ECHO BSA: 1.81 M2
EKG ATRIAL RATE: 57 BPM
EKG DIAGNOSIS: NORMAL
EKG P AXIS: 0 DEGREES
EKG P-R INTERVAL: 100 MS
EKG Q-T INTERVAL: 450 MS
EKG QRS DURATION: 102 MS
EKG QTC CALCULATION (BAZETT): 438 MS
EKG R AXIS: -28 DEGREES
EKG T AXIS: -49 DEGREES
EKG VENTRICULAR RATE: 57 BPM
EOSINOPHIL # BLD: 0.1 K/UL (ref 0–0.6)
EOSINOPHIL NFR BLD: 1.6 %
GFR SERPLBLD CREATININE-BSD FMLA CKD-EPI: >90 ML/MIN/{1.73_M2}
GLUCOSE SERPL-MCNC: 85 MG/DL (ref 70–99)
HGB BLD-MCNC: 12.4 G/DL (ref 12–16)
LYMPHOCYTES # BLD: 2.5 K/UL (ref 1–5.1)
LYMPHOCYTES NFR BLD: 39 %
MCH RBC QN AUTO: 30.5 PG (ref 26–34)
MCHC RBC AUTO-ENTMCNC: 32.6 G/DL (ref 31–36)
MCV RBC AUTO: 93.7 FL (ref 80–100)
MONOCYTES # BLD: 0.8 K/UL (ref 0–1.3)
MONOCYTES NFR BLD: 12 %
NEUTROPHILS # BLD: 2.9 K/UL (ref 1.7–7.7)
NEUTROPHILS NFR BLD: 46.8 %
NUC STRESS EJECTION FRACTION: 62 %
NUC STRESS LV EDV: 90 ML (ref 56–104)
NUC STRESS LV ESV: 34 ML (ref 19–49)
NUC STRESS LV MASS: 127 G
PLATELET # BLD AUTO: 253 K/UL (ref 135–450)
PMV BLD AUTO: 7.6 FL (ref 5–10.5)
POTASSIUM SERPL-SCNC: 4.1 MMOL/L (ref 3.5–5.1)
RBC # BLD AUTO: 4.05 M/UL (ref 4–5.2)
SODIUM SERPL-SCNC: 139 MMOL/L (ref 136–145)
STRESS BASELINE DIAS BP: 88 MMHG
STRESS BASELINE HR: 51 BPM
STRESS BASELINE SYS BP: 194 MMHG
STRESS ESTIMATED WORKLOAD: 1 METS
STRESS EXERCISE DUR MIN: 4 MIN
STRESS EXERCISE DUR SEC: 0 SEC
STRESS O2 SAT PEAK: 93 %
STRESS O2 SAT REST: 93 %
STRESS PEAK DIAS BP: 92 MMHG
STRESS PEAK SYS BP: 177 MMHG
STRESS PERCENT HR ACHIEVED: 61 %
STRESS POST PEAK HR: 92 BPM
STRESS RATE PRESSURE PRODUCT: NORMAL BPM*MMHG
STRESS TARGET HR: 150 BPM
TID: 1.08
WBC # BLD AUTO: 6.3 K/UL (ref 4–11)

## 2024-08-05 PROCEDURE — 93018 CV STRESS TEST I&R ONLY: CPT | Performed by: INTERNAL MEDICINE

## 2024-08-05 PROCEDURE — 93010 ELECTROCARDIOGRAM REPORT: CPT | Performed by: INTERNAL MEDICINE

## 2024-08-05 PROCEDURE — 80048 BASIC METABOLIC PNL TOTAL CA: CPT

## 2024-08-05 PROCEDURE — 3430000000 HC RX DIAGNOSTIC RADIOPHARMACEUTICAL: Performed by: INTERNAL MEDICINE

## 2024-08-05 PROCEDURE — A9502 TC99M TETROFOSMIN: HCPCS | Performed by: INTERNAL MEDICINE

## 2024-08-05 PROCEDURE — G0378 HOSPITAL OBSERVATION PER HR: HCPCS

## 2024-08-05 PROCEDURE — 36415 COLL VENOUS BLD VENIPUNCTURE: CPT

## 2024-08-05 PROCEDURE — 93016 CV STRESS TEST SUPVJ ONLY: CPT | Performed by: INTERNAL MEDICINE

## 2024-08-05 PROCEDURE — 93017 CV STRESS TEST TRACING ONLY: CPT

## 2024-08-05 PROCEDURE — 78452 HT MUSCLE IMAGE SPECT MULT: CPT

## 2024-08-05 PROCEDURE — 78452 HT MUSCLE IMAGE SPECT MULT: CPT | Performed by: INTERNAL MEDICINE

## 2024-08-05 PROCEDURE — 99223 1ST HOSP IP/OBS HIGH 75: CPT | Performed by: INTERNAL MEDICINE

## 2024-08-05 PROCEDURE — 6370000000 HC RX 637 (ALT 250 FOR IP): Performed by: INTERNAL MEDICINE

## 2024-08-05 PROCEDURE — 6360000002 HC RX W HCPCS: Performed by: INTERNAL MEDICINE

## 2024-08-05 PROCEDURE — 85025 COMPLETE CBC W/AUTO DIFF WBC: CPT

## 2024-08-05 RX ORDER — METOPROLOL SUCCINATE 25 MG/1
25 TABLET, EXTENDED RELEASE ORAL DAILY
Qty: 30 TABLET | Refills: 1 | Status: SHIPPED | OUTPATIENT
Start: 2024-08-05

## 2024-08-05 RX ORDER — AMINOPHYLLINE 25 MG/ML
100 INJECTION, SOLUTION INTRAVENOUS ONCE
Status: COMPLETED | OUTPATIENT
Start: 2024-08-05 | End: 2024-08-05

## 2024-08-05 RX ADMIN — LOSARTAN POTASSIUM 100 MG: 100 TABLET, FILM COATED ORAL at 08:20

## 2024-08-05 RX ADMIN — GABAPENTIN 600 MG: 300 CAPSULE ORAL at 08:19

## 2024-08-05 RX ADMIN — AMINOPHYLLINE 100 MG: 25 INJECTION, SOLUTION INTRAVENOUS at 10:01

## 2024-08-05 RX ADMIN — Medication 100 MG: at 08:19

## 2024-08-05 RX ADMIN — ASPIRIN 81 MG: 81 TABLET, CHEWABLE ORAL at 08:20

## 2024-08-05 RX ADMIN — TETROFOSMIN 11.4 MILLICURIE: 1.38 INJECTION, POWDER, LYOPHILIZED, FOR SOLUTION INTRAVENOUS at 09:01

## 2024-08-05 RX ADMIN — REGADENOSON 0.4 MG: 0.08 INJECTION, SOLUTION INTRAVENOUS at 09:48

## 2024-08-05 RX ADMIN — PANTOPRAZOLE SODIUM 40 MG: 40 TABLET, DELAYED RELEASE ORAL at 05:19

## 2024-08-05 RX ADMIN — GABAPENTIN 600 MG: 300 CAPSULE ORAL at 14:36

## 2024-08-05 RX ADMIN — FOLIC ACID 1 MG: 1 TABLET ORAL at 08:20

## 2024-08-05 RX ADMIN — TETROFOSMIN 31.4 MILLICURIE: 1.38 INJECTION, POWDER, LYOPHILIZED, FOR SOLUTION INTRAVENOUS at 09:50

## 2024-08-05 NOTE — CARE COORDINATION
Discharge Planning Note:  Chart reviewed for discharge needs and it appears that patient has minimal needs for discharge at this time.    Primary Care Physician is Bettye Saldaña MD    Primary insurance is [employer] MEDICARE    Please notify case management if any discharge needs are identified.      Case management will continue to follow progress and update discharge plan as needed.

## 2024-08-05 NOTE — H&P
of moderate.  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  Does not have any illicit substance abuse history.    REVIEW OF SYSTEMS:  Negative for loss of consciousness.  No visual blurring.  No TIA.  No speech disturbance.  No dysphagia.  No definite exertional angina.  Body mass index is 24.61.  No orthopnea or paroxysmal nocturnal dyspnea.  No abdominal pain.  No hematemesis or melena.  No genitourinary complaints.  Does have some chronic musculoskeletal pain.    PHYSICAL EXAMINATION:  GENERAL:  Alert, awake, oriented x3.  A very pleasant 69-year-old black American lady looking consistent with her stated age or somewhat younger.   VITAL SIGNS:  Temperature 97.8, blood pressure 167/91, respirations 18, heart rate 60, O2 saturation 94% on room air.  HEENT:  Oral mucosa dry.  SKIN:  Warm and dry.  NECK:  Supple.  Mild jugular venous distention.  No carotid bruit.  No lymphadenopathy.  No thyromegaly.    CHEST: No chest wall tenderness.  LUNGS:  Vesicular breath sounds.  Prolonged exhalation.  No wheezing.  HEART: Regular rate and rhythm.  S1, S2.  1/6 systolic ejection murmur.  No gallop rhythm.  ABDOMEN:  Soft, nontender.  Bowel sounds present.  EXTREMITIES:  Trace edema.  NEUROLOGIC:  Grossly intact.    LABORATORY DATA:  Laboratory evaluation shows sodium 143, potassium 3.8, chloride 108, CO2 of 27, BUN 8, creatinine less than 0.5.  Three sets of troponin have been negative.  Cholesterol 150, HDL 63, LDL 41.  Triglycerides 56.  VLDL 11.  Liver panel within normal limits.  TSH is 6.02.  White blood cell count 5.4, hemoglobin and hematocrit are 12.3 and 37.8, platelet count 261.  PT/INR is 13.8 and 1.06.  Urinalysis shows no evidence of UTI.  Chest x-ray shows COPD, no acute abnormality.  EKG:  Sinus bradycardia, no acute ischemic event.    ASSESSMENT:  Chest pain, possibly unstable angina, palpitation, atherosclerotic heart disease, chronic obstructive pulmonary disease, history of tobacco abuse, history of alcohol

## 2024-08-05 NOTE — PROGRESS NOTES
Hospital Problems             Last Modified POA    * (Principal) Chest pain 8/4/2024 Yes    ASHD (arteriosclerotic heart disease) 8/4/2024 Yes    Emphysema lung (HCC) 8/4/2024 Yes    Primary hypertension 8/4/2024 Yes    Palpitation 8/4/2024 Yes   H&P dictated

## 2024-08-05 NOTE — PLAN OF CARE
Problem: Discharge Planning  Goal: Discharge to home or other facility with appropriate resources  8/5/2024 1500 by Ashanti Gan RN  Outcome: Completed  8/5/2024 1134 by Ashanti Gan RN  Outcome: Progressing     Problem: Safety - Adult  Goal: Free from fall injury  8/5/2024 1500 by Ashanti Gan RN  Outcome: Completed  8/5/2024 1134 by Ashanti Gan RN  Outcome: Progressing     Problem: Cardiovascular - Adult  Goal: Maintains optimal cardiac output and hemodynamic stability  8/5/2024 1500 by Ashanti Gan RN  Outcome: Completed  8/5/2024 1134 by Ashanti Gan RN  Outcome: Progressing  Goal: Absence of cardiac dysrhythmias or at baseline  8/5/2024 1500 by Ashanti Gan RN  Outcome: Completed  8/5/2024 1134 by Ashanti Gan RN  Outcome: Progressing     Problem: Chronic Conditions and Co-morbidities  Goal: Patient's chronic conditions and co-morbidity symptoms are monitored and maintained or improved  8/5/2024 1500 by Ashanti Gan RN  Outcome: Completed  8/5/2024 1134 by Ashanti Gan RN  Outcome: Progressing

## 2024-08-05 NOTE — PLAN OF CARE
Problem: Discharge Planning  Goal: Discharge to home or other facility with appropriate resources  Outcome: Progressing  Flowsheets (Taken 8/4/2024 1528 by Carolee Alcantara, RN)  Discharge to home or other facility with appropriate resources: Identify barriers to discharge with patient and caregiver     Problem: Safety - Adult  Goal: Free from fall injury  8/4/2024 2257 by Amelia Genao, RN  Outcome: Progressing  8/4/2024 1808 by Carolee Alcantara, RN  Outcome: Progressing     Problem: Cardiovascular - Adult  Goal: Maintains optimal cardiac output and hemodynamic stability  Outcome: Progressing  Goal: Absence of cardiac dysrhythmias or at baseline  Outcome: Progressing

## 2024-08-05 NOTE — CONSULTS
[facility]  Cardiology Note  [phone]      Chief Complaint   Patient presents with    Palpitations     Pt arrives from home. Pt C/O palpitations for 2 months. Pt states that her palpitations come and go and it has progressively getting worse. Pt denies any pain. Hx of cardiac arrest for 8 years ago and 2 stents.        History of Present Illness:  Riri Price is a 69 y.o. patient PMHx CVA PCI to OM1 2016, RCA 2021, EtOH abuse, tobacco abuse, HTN, HLD who presented to the hospital with complaints of palpitations    Patient report several months of palpitations worse when still or resting especially in the mornings or evenings when in bed. Improve/disappear with exertion.     She had a single episode of central chest pain the day of admission without clear trigger that resolved over an hour or so. She denies similar exertional symptoms in the past    Past Medical History:   has a past medical history of Brain aneurysm, CAD (coronary artery disease), Cerebral artery occlusion with cerebral infarction (HCC), COPD (chronic obstructive pulmonary disease) (HCC), ETOH abuse, GERD (gastroesophageal reflux disease), Glaucoma, History of blood transfusion, HTN (hypertension), Hx SBO, Hyperlipidemia, Myocardial infarct, old, Pancreatitis, and Shingles.    Surgical History:   has a past surgical history that includes Abdomen surgery; US BIOPSY LIVER PERCUTANEOUS (7/2/2020); Colonoscopy (09/2021); Endoscopy, colon, diagnostic (09/2021); Hysterectomy (2001); Dilatation, esophagus; Breast surgery (2009); and Coronary angioplasty with stent (2016).     Social History:   reports that she quit smoking about 8 years ago. Her smoking use included cigarettes. She started smoking about 38 years ago. She has a 30.0 pack-year smoking history. She has never used smokeless tobacco. She reports that she does not currently use alcohol. She reports that she does not use drugs.     Family History:  Family History   Problem

## 2024-08-05 NOTE — PROGRESS NOTES
Data- discharge order received, pt verbalized agreement to discharge, disposition to previous residence, no needs for HHC/DME.     Action- discharge instructions prepared and given to patient, pt verbalized understanding. Medication information packet given r/t NEW and/or CHANGED prescriptions emphasizing name/purpose/side effects, pt verbalized understanding. Discharge instruction summary: Diet- Regular, Activity- as tolerated, Primary Care Physician as follows: Bettye Saldaña -[phone] f/u appointment in 1 week, prescription medications filled at [facility] at [address] [place] [address]     1. WEIGHT: Admit Weight - Scale: 69.2 kg (152 lb 8 oz) (08/04/24 0814)        Today  Weight - Scale: 70.3 kg (155 lb) (08/05/24 0854)       2. O2 SAT.: SpO2: 97 % (08/05/24 1153)    Response- Pt belongings gathered, IV removed. Disposition is home (no HHC/DME needs), taken to lobby via w/c w/ RN, patient able to drive herself home, no complications.

## 2024-08-05 NOTE — PLAN OF CARE
Problem: Discharge Planning  Goal: Discharge to home or other facility with appropriate resources  8/5/2024 1134 by Ashanti Gan RN  Outcome: Progressing  8/4/2024 2257 by Amelia Genao RN  Outcome: Progressing  Flowsheets (Taken 8/4/2024 1528 by Carolee Alcantara RN)  Discharge to home or other facility with appropriate resources: Identify barriers to discharge with patient and caregiver     Problem: Safety - Adult  Goal: Free from fall injury  8/5/2024 1134 by Ashanti Gan RN  Outcome: Progressing  8/4/2024 2257 by Amelia Genao RN  Outcome: Progressing     Problem: Cardiovascular - Adult  Goal: Maintains optimal cardiac output and hemodynamic stability  8/5/2024 1134 by Ashanti Gan RN  Outcome: Progressing  8/4/2024 2257 by Amelia Genao RN  Outcome: Progressing  Goal: Absence of cardiac dysrhythmias or at baseline  8/5/2024 1134 by Ashanti Gan RN  Outcome: Progressing  8/4/2024 2257 by Amelia Genao RN  Outcome: Progressing     Problem: Chronic Conditions and Co-morbidities  Goal: Patient's chronic conditions and co-morbidity symptoms are monitored and maintained or improved  Outcome: Progressing

## 2024-09-30 ENCOUNTER — OFFICE VISIT (OUTPATIENT)
Dept: CARDIOLOGY CLINIC | Age: 69
End: 2024-09-30
Payer: MEDICARE

## 2024-09-30 VITALS
HEART RATE: 68 BPM | WEIGHT: 153.2 LBS | DIASTOLIC BLOOD PRESSURE: 64 MMHG | BODY MASS INDEX: 24.62 KG/M2 | OXYGEN SATURATION: 96 % | SYSTOLIC BLOOD PRESSURE: 122 MMHG | HEIGHT: 66 IN

## 2024-09-30 DIAGNOSIS — I25.10 CORONARY ARTERY DISEASE DUE TO LIPID RICH PLAQUE: ICD-10-CM

## 2024-09-30 DIAGNOSIS — I10 PRIMARY HYPERTENSION: ICD-10-CM

## 2024-09-30 DIAGNOSIS — I25.83 CORONARY ARTERY DISEASE DUE TO LIPID RICH PLAQUE: ICD-10-CM

## 2024-09-30 DIAGNOSIS — R00.0 RACING HEART BEAT: Primary | ICD-10-CM

## 2024-09-30 DIAGNOSIS — E78.2 MIXED HYPERLIPIDEMIA: ICD-10-CM

## 2024-09-30 PROCEDURE — 3074F SYST BP LT 130 MM HG: CPT | Performed by: NURSE PRACTITIONER

## 2024-09-30 PROCEDURE — 1123F ACP DISCUSS/DSCN MKR DOCD: CPT | Performed by: NURSE PRACTITIONER

## 2024-09-30 PROCEDURE — 99214 OFFICE O/P EST MOD 30 MIN: CPT | Performed by: NURSE PRACTITIONER

## 2024-09-30 PROCEDURE — 3078F DIAST BP <80 MM HG: CPT | Performed by: NURSE PRACTITIONER

## 2024-09-30 RX ORDER — NITROGLYCERIN 0.4 MG/1
0.4 TABLET SUBLINGUAL EVERY 5 MIN PRN
Qty: 25 TABLET | Refills: 3 | Status: SHIPPED | OUTPATIENT
Start: 2024-09-30

## 2024-10-14 ENCOUNTER — CLINICAL DOCUMENTATION (OUTPATIENT)
Dept: CASE MANAGEMENT | Age: 69
End: 2024-10-14

## 2024-10-14 NOTE — PROGRESS NOTES
Patient meets lung screening criteria. Patient due for annual CT Lung Screening. First reminder letter mailed. If ordered, Patient may call [phone] to schedule.     Lung Screen Criteria  Age 50-80  Current smoker or quit within the last 15 years  Has => 20 pack year history.    Future Appointments   Date Time Provider Department Center   1/27/2025 10:30 AM Sanna Leonardo MD [facility]       Active Lung Screen order on chart.

## 2024-10-19 DIAGNOSIS — J44.9 COPD, SEVERE (HCC): ICD-10-CM

## 2024-10-21 RX ORDER — FLUTICASONE FUROATE, UMECLIDINIUM BROMIDE AND VILANTEROL TRIFENATATE 100; 62.5; 25 UG/1; UG/1; UG/1
POWDER RESPIRATORY (INHALATION)
Qty: 60 EACH | Refills: 3 | Status: SHIPPED | OUTPATIENT
Start: 2024-10-21

## 2024-12-11 ENCOUNTER — CLINICAL DOCUMENTATION (OUTPATIENT)
Dept: CASE MANAGEMENT | Age: 69
End: 2024-12-11

## 2024-12-11 NOTE — PROGRESS NOTES
Patient meets lung screening criteria. Patient due for annual CT Lung Screening. Second reminder letter mailed. If ordered, Patient may call [phone] to schedule.     Lung Screen Criteria  Age 50-80  Current smoker or quit within the last 15 years  Has => 20 pack year history.    Future Appointments   Date Time Provider Department Center   1/27/2025 10:30 AM Sanna Leonardo MD [facility]       Active Lung Screen order on chart.

## 2024-12-23 ENCOUNTER — HOSPITAL ENCOUNTER (OUTPATIENT)
Dept: PULMONOLOGY | Age: 69
Discharge: HOME | End: 2024-12-23
Attending: INTERNAL MEDICINE
Payer: MEDICARE

## 2024-12-23 ENCOUNTER — HOSPITAL ENCOUNTER (OUTPATIENT)
Dept: CT IMAGING | Age: 69
Discharge: HOME | End: 2024-12-23
Attending: INTERNAL MEDICINE
Payer: MEDICARE

## 2024-12-23 DIAGNOSIS — J44.9 COPD, SEVERE (HCC): ICD-10-CM

## 2024-12-23 DIAGNOSIS — Z87.891 PERSONAL HISTORY OF TOBACCO USE: ICD-10-CM

## 2024-12-23 LAB
DLCO %PRED: 35 %
DLCO PRED: NORMAL
DLCO/VA %PRED: NORMAL
DLCO/VA PRED: NORMAL
DLCO/VA: NORMAL
DLCO: NORMAL
EXPIRATORY TIME-POST: NORMAL
EXPIRATORY TIME: NORMAL
FEF 25-75 %CHNG: NORMAL
FEF 25-75 POST %PRED: NORMAL
FEF 25-75% %PRED-PRE: NORMAL
FEF 25-75% PRED: NORMAL
FEF 25-75-POST: NORMAL
FEF 25-75-PRE: NORMAL
FEV1 %PRED-POST: 35 %
FEV1 %PRED-PRE: 34 %
FEV1 PRED: NORMAL
FEV1-POST: NORMAL
FEV1-PRE: NORMAL
FEV1/FVC %PRED-POST: NORMAL
FEV1/FVC %PRED-PRE: NORMAL
FEV1/FVC PRED: NORMAL
FEV1/FVC-POST: 60 %
FEV1/FVC-PRE: 55 %
FVC %PRED-POST: NORMAL
FVC %PRED-PRE: NORMAL
FVC PRED: NORMAL
FVC-POST: NORMAL
FVC-PRE: NORMAL
GAW %PRED: NORMAL
GAW PRED: NORMAL
GAW: NORMAL
IC PRE %PRED: NORMAL
IC PRED: NORMAL
IC: NORMAL
MEP: NORMAL
MIP: NORMAL
MVV %PRED-PRE: NORMAL
MVV PRED: NORMAL
MVV-PRE: NORMAL
PEF %PRED-POST: NORMAL
PEF %PRED-PRE: NORMAL
PEF PRED: NORMAL
PEF%CHNG: NORMAL
PEF-POST: NORMAL
PEF-PRE: NORMAL
RAW %PRED: NORMAL
RAW PRED: NORMAL
RAW: NORMAL
RV PRE %PRED: NORMAL
RV PRED: NORMAL
RV: NORMAL
SVC %PRED: NORMAL
SVC PRED: NORMAL
SVC: NORMAL
TLC PRE %PRED: 101 %
TLC PRED: NORMAL
TLC: NORMAL
VA %PRED: NORMAL
VA PRED: NORMAL
VA: NORMAL
VTG %PRED: NORMAL
VTG PRED: NORMAL
VTG: NORMAL

## 2024-12-23 PROCEDURE — 94060 EVALUATION OF WHEEZING: CPT

## 2024-12-23 PROCEDURE — 6370000000 HC RX 637 (ALT 250 FOR IP): Performed by: INTERNAL MEDICINE

## 2024-12-23 PROCEDURE — 94729 DIFFUSING CAPACITY: CPT

## 2024-12-23 PROCEDURE — 94726 PLETHYSMOGRAPHY LUNG VOLUMES: CPT

## 2024-12-23 PROCEDURE — 71271 CT THORAX LUNG CANCER SCR C-: CPT

## 2024-12-23 PROCEDURE — 94760 N-INVAS EAR/PLS OXIMETRY 1: CPT

## 2024-12-23 RX ORDER — ALBUTEROL SULFATE 90 UG/1
4 INHALANT RESPIRATORY (INHALATION) ONCE
Status: COMPLETED | OUTPATIENT
Start: 2024-12-23 | End: 2024-12-23

## 2024-12-23 RX ADMIN — Medication 4 PUFF: at 11:56

## 2024-12-23 ASSESSMENT — PULMONARY FUNCTION TESTS
FEV1/FVC_POST: 60
FEV1_PERCENT_PREDICTED_PRE: 34
FEV1_PERCENT_PREDICTED_POST: 35
FEV1/FVC_PRE: 55

## 2024-12-24 NOTE — PROCEDURES
Pulmonary Function Testing      Patient name:  Riri Price      Unit #:   [id number]   Date of test:  12/23/2024   Date of interpretation:   12/24/2024    Ms. Riri Price is a 69 y.o. year-old former smoker. The spirometry data were acceptable and reproducible.     Spirometry:  Flow volume loops were obstructed. The FEV-1/FVC ratio was decreased. The pre-bronchodilator FEV-1 was 0.78 liters (34% of predicted), which was severely decreased. The FVC was 1.78 liters (60% of predicted), which was decreased. Response to inhaled bronchodilators (albuterol) was not significant.    Lung volumes:  Lung volumes were tested by plethysmography. The total lung capacity was 5.43 liters (101% of predicted), which was normal. The residual volume was 3.73 liters (162% of predicted), which was increased. The ratio of residual volume to total lung capacity (RV/TLC) was 69, which was increased.     Diffusion capacity was found to be 32% predicted which is Severely decreased.      Interpretation:  Severe obstruction with no significant bronchodilator reversibility.    Comments:

## 2025-01-02 ENCOUNTER — CLINICAL DOCUMENTATION (OUTPATIENT)
Dept: CASE MANAGEMENT | Age: 70
End: 2025-01-02

## 2025-01-02 NOTE — PROGRESS NOTES
Recent Lung Screen Complete.  LRAD 1-2.  Recommended screen in 12 months  Results letter mailed. Will follow in the lung screening program.    Jacque Brown RN  Lung Navigator  [address] [address] [phone]  [email or web address]

## 2025-02-17 NOTE — PROGRESS NOTES
[facility]  [phone]      Patient: Riri Price  YOB: 1955         Chief Complaint   Patient presents with    Follow-up     6mo f/u [facility]        Referring provider: Bettye Saldaña MD    History of Present Illness:   Riri Price is a 69 y.o. female presenting in follow up.     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     Today she is here alone. She feels she has been doing well since last OV. She continues with chronic dyspnea but she feels it is stable. Reports she will notice a \"dull ache from time to time\" in her chest but is not bothersome. She is able to be active as she wishes without concern. Continues to work as PCA on nights.     With regard to medication therapy he/she has been compliant with prescribed regimen and has tolerated therapy to date.    Past Medical History:   has a past medical history of Brain aneurysm, CAD (coronary artery disease), Cerebral artery occlusion with cerebral infarction (HCC), COPD (chronic obstructive pulmonary disease) (HCC), ETOH abuse, GERD (gastroesophageal reflux disease), Glaucoma, History of blood transfusion, HTN (hypertension), Hx SBO, Hyperlipidemia, Myocardial infarct, old, Pancreatitis, and Shingles.    Surgical History:   has a past surgical history that includes Abdomen surgery; US BIOPSY LIVER PERCUTANEOUS (7/2/2020); Colonoscopy (09/2021); Endoscopy, colon, diagnostic (09/2021); Hysterectomy (2001); Dilatation, esophagus; Breast surgery (2009); and Coronary angioplasty with stent (2016).     Current Outpatient Medications   Medication Sig Dispense Refill    TRELEGY ELLIPTA 100-62.5-25 MCG/ACT AEPB inhaler INHALE 1 PUFF BY MOUTH DAILY 60 each 3    nitroGLYCERIN (NITROSTAT) 0.4 MG SL tablet Place 1 tablet under the tongue every 5 minutes as needed for Chest pain 25 tablet 3    metoprolol succinate (TOPROL XL) 25 MG extended release tablet Take 1 tablet by mouth daily 30 tablet

## 2025-02-20 ENCOUNTER — OFFICE VISIT (OUTPATIENT)
Dept: PULMONOLOGY | Age: 70
End: 2025-02-20
Payer: MEDICARE

## 2025-02-20 ENCOUNTER — TELEPHONE (OUTPATIENT)
Dept: PULMONOLOGY | Age: 70
End: 2025-02-20

## 2025-02-20 VITALS
HEART RATE: 55 BPM | BODY MASS INDEX: 24.36 KG/M2 | OXYGEN SATURATION: 98 % | WEIGHT: 151.6 LBS | DIASTOLIC BLOOD PRESSURE: 76 MMHG | HEIGHT: 66 IN | SYSTOLIC BLOOD PRESSURE: 124 MMHG

## 2025-02-20 DIAGNOSIS — Z87.891 PERSONAL HISTORY OF TOBACCO USE: ICD-10-CM

## 2025-02-20 DIAGNOSIS — J44.9 COPD, SEVERE (HCC): Primary | ICD-10-CM

## 2025-02-20 PROCEDURE — 1160F RVW MEDS BY RX/DR IN RCRD: CPT | Performed by: INTERNAL MEDICINE

## 2025-02-20 PROCEDURE — 99214 OFFICE O/P EST MOD 30 MIN: CPT | Performed by: INTERNAL MEDICINE

## 2025-02-20 PROCEDURE — 3078F DIAST BP <80 MM HG: CPT | Performed by: INTERNAL MEDICINE

## 2025-02-20 PROCEDURE — G2211 COMPLEX E/M VISIT ADD ON: HCPCS | Performed by: INTERNAL MEDICINE

## 2025-02-20 PROCEDURE — 1123F ACP DISCUSS/DSCN MKR DOCD: CPT | Performed by: INTERNAL MEDICINE

## 2025-02-20 PROCEDURE — 1159F MED LIST DOCD IN RCRD: CPT | Performed by: INTERNAL MEDICINE

## 2025-02-20 PROCEDURE — 3074F SYST BP LT 130 MM HG: CPT | Performed by: INTERNAL MEDICINE

## 2025-02-20 NOTE — TELEPHONE ENCOUNTER
Per Dr Leonardo, Pt needs assistance with her Trelegy copay of $167 monthly. I told her there is only 1 program for Trelegy assistance when a pt is on Medicare and that is through [employer],  for Trelegy. I have LM for pt to call me back so we can see if she has the income limits and other qualifications for this program.

## 2025-02-20 NOTE — PROGRESS NOTES
into the lungs every 4 hours 360 mL 5    atorvastatin (LIPITOR) 80 MG tablet Take 1 tablet by mouth daily 30 tablet 2    losartan (COZAAR) 100 MG tablet Take 1 tablet by mouth daily 30 tablet 2    pantoprazole (PROTONIX) 40 MG tablet Take 1 tablet by mouth 2 times daily (before meals) 30 tablet 3    HYDROcodone-acetaminophen  MG TABS Take 1 tablet by mouth 4 times daily as needed for Pain. Vicodin 10/325 mg every 6 hours as needed      folic acid (FOLVITE) 1 MG tablet Take 1 tablet by mouth daily 30 tablet 3    vitamin B-1 100 MG tablet Take 1 tablet by mouth daily 30 tablet 3    aspirin 81 MG chewable tablet Take 1 tablet by mouth daily       No current facility-administered medications on file prior to visit.               ALLERGIES:   Allergies as of 02/20/2025 - Fully Reviewed 02/20/2025   Allergen Reaction Noted    Lisinopril Swelling 06/25/2020    Dilaudid [hydromorphone hcl] Itching 07/02/2020      OBJECTIVE:   height is 1.676 m (5' 6\") and weight is 68.8 kg (151 lb 9.6 oz). Her blood pressure is 124/76 and her pulse is 55. Her oxygen saturation is 98%.       PHYSICAL EXAM:    CONSTITUTIONAL: She is a 69 y.o.-year-old who appears her stated age. She is alert and oriented x 3 and in no acute distress.   HEENT: No scleral icterus. Atraumatic, normocephalic.  NECK: Supple, without cervical or supraclavicular lymphadenopathy:  CARDIOVASCULAR: S1 S2 RRR. Without murmer  RESPIRATORY & CHEST: Lungs are clear to auscultation and percussion. No wheezing, no crackles. Good air movement  GASTROINTESTINAL & ABDOMEN: Soft, nontender, non-distended  GENITOURINARY: Deferred.   MUSCULOSKELETAL: There is no clubbing. No cyanosis. No edema of the lower extremities.   SKIN OF BODY: No rash or jaundice.   PSYCHIATRIC EVALUATION: Normal affect. Patient answers questions appropriately.   HEMATOLOGIC/LYMPHATIC/ IMMUNOLOGIC: No palpable lymphadenopathy.  NEUROLOGIC: Alert and oriented x 3.Groslly non-focal. Motor strength is

## 2025-02-20 NOTE — TELEPHONE ENCOUNTER
Spoke to Riri regarding the assistance program through [employer]. She wouldn't qualify for it currently due to not having paid out $600 yet for this calendar year in her prescription. Once that happens, she can call us back and we can try to sign her up for the [employer] if she chooses but we will need proof of the out of pocket rx medications she has paid for so far in the form of the EOB from [employer].      Told her since she has a medicare plan, this is the only one we can use for assistance. I let her know that she can also call [employer] on the back of her card and ask them about the Medicare prescription drug payment program that may help her. She stated she spends a lot total in her prescriptions yearly as well. I told her I would mail her the flyer since she doesn't have [employer] and she can also call her insurance company to sign up for it and it would save her a lot yearly. She is going to call them and then inform us of her decision.

## 2025-03-10 ENCOUNTER — OFFICE VISIT (OUTPATIENT)
Dept: CARDIOLOGY CLINIC | Age: 70
End: 2025-03-10

## 2025-03-10 VITALS
HEIGHT: 66 IN | BODY MASS INDEX: 25.1 KG/M2 | TEMPERATURE: 96.8 F | WEIGHT: 156.2 LBS | SYSTOLIC BLOOD PRESSURE: 122 MMHG | DIASTOLIC BLOOD PRESSURE: 72 MMHG | OXYGEN SATURATION: 100 % | HEART RATE: 52 BPM

## 2025-03-10 DIAGNOSIS — I25.10 CORONARY ARTERY DISEASE DUE TO LIPID RICH PLAQUE: Primary | ICD-10-CM

## 2025-03-10 DIAGNOSIS — I10 PRIMARY HYPERTENSION: ICD-10-CM

## 2025-03-10 DIAGNOSIS — I25.83 CORONARY ARTERY DISEASE DUE TO LIPID RICH PLAQUE: Primary | ICD-10-CM

## 2025-03-10 DIAGNOSIS — E78.2 MIXED HYPERLIPIDEMIA: ICD-10-CM

## 2025-03-10 DIAGNOSIS — Z98.61 HISTORY OF PERCUTANEOUS CORONARY INTERVENTION: ICD-10-CM

## 2025-03-10 DIAGNOSIS — R00.2 PALPITATION: ICD-10-CM

## 2025-03-10 DIAGNOSIS — J44.9 COPD, SEVERE (HCC): ICD-10-CM

## 2025-03-10 DIAGNOSIS — J43.9 PULMONARY EMPHYSEMA, UNSPECIFIED EMPHYSEMA TYPE (HCC): ICD-10-CM

## 2025-03-10 RX ORDER — ALBUTEROL SULFATE 90 UG/1
2 INHALANT RESPIRATORY (INHALATION) EVERY 6 HOURS PRN
Qty: 18 G | Refills: 6 | Status: SHIPPED | OUTPATIENT
Start: 2025-03-10

## 2025-04-16 DIAGNOSIS — J44.9 COPD, SEVERE (HCC): ICD-10-CM

## 2025-04-17 RX ORDER — FLUTICASONE FUROATE, UMECLIDINIUM BROMIDE AND VILANTEROL TRIFENATATE 100; 62.5; 25 UG/1; UG/1; UG/1
1 POWDER RESPIRATORY (INHALATION) DAILY
Qty: 180 EACH | Refills: 3 | Status: SHIPPED | OUTPATIENT
Start: 2025-04-17

## 2025-04-17 RX ORDER — ALBUTEROL SULFATE 90 UG/1
2 INHALANT RESPIRATORY (INHALATION) EVERY 6 HOURS PRN
Qty: 54 G | Refills: 3 | Status: SHIPPED | OUTPATIENT
Start: 2025-04-17

## 2025-04-21 DIAGNOSIS — J44.9 COPD, SEVERE (HCC): ICD-10-CM

## 2025-04-21 RX ORDER — ALBUTEROL SULFATE 90 UG/1
INHALANT RESPIRATORY (INHALATION)
Qty: 2 EACH | Refills: 0 | OUTPATIENT
Start: 2025-04-21

## 2025-04-21 NOTE — TELEPHONE ENCOUNTER
Last appointment:  2/20/2025    Next appointment:  8/20/2025    Last refill: [unfilled]     Duplicate. Albuterol already filled on 4/17/25.

## 2025-04-28 DIAGNOSIS — J44.9 COPD, SEVERE (HCC): ICD-10-CM

## 2025-04-28 RX ORDER — FLUTICASONE FUROATE, UMECLIDINIUM BROMIDE AND VILANTEROL TRIFENATATE 100; 62.5; 25 UG/1; UG/1; UG/1
POWDER RESPIRATORY (INHALATION)
Qty: 60 EACH | Refills: 2 | Status: SHIPPED | OUTPATIENT
Start: 2025-04-28

## 2025-04-30 ENCOUNTER — TELEPHONE (OUTPATIENT)
Dept: CARDIOLOGY CLINIC | Age: 70
End: 2025-04-30

## 2025-04-30 NOTE — TELEPHONE ENCOUNTER
Pt called stating they need CC   CARDIAC CLEARANCE   What type of procedure are you having?  Knee surgery  Which physician is performing your procedure?  Lynda Arriaga, DO  When is your procedure scheduled for?  5/12   Where are you having this procedure?  [facility]  Are you taking Blood Thinners?  Yes     If so what? (Name/dose/frequesncy) ASA  Does the surgeon want you to stop your blood thinner? Has not mentioned it     Phone Number and Contact Name for Physicians office:   [phone]  Fax number to send information:   [phone]

## 2025-07-16 NOTE — TELEPHONE ENCOUNTER
Medication Refill    Medication needing refilled:  metoprolol succinate (TOPROL XL) 25 MG     Dosage of the medication:    How are you taking this medication (QD, BID, TID, QID, PRN):1 tablet by mouth daily     30 or 90 day supply called in: 90 day supply    When will you run out of your medication:    Which Pharmacy are we sending the medication to?:[facility] [id number] [id number] [facility] [phone] [facility] [phone]

## 2025-07-16 NOTE — TELEPHONE ENCOUNTER
Received refill request for metoprolol succinate  from [facility].     Last OV: 3/10/25    Next OV:     Last Labs:     Last Filled: 8/5/24

## 2025-07-17 DIAGNOSIS — I10 PRIMARY HYPERTENSION: Primary | ICD-10-CM

## 2025-07-17 RX ORDER — METOPROLOL SUCCINATE 25 MG/1
25 TABLET, EXTENDED RELEASE ORAL DAILY
Qty: 90 TABLET | Refills: 3 | Status: SHIPPED | OUTPATIENT
Start: 2025-07-17

## 2025-07-17 RX ORDER — METOPROLOL SUCCINATE 25 MG/1
25 TABLET, EXTENDED RELEASE ORAL DAILY
Qty: 90 TABLET | Refills: 3 | Status: SHIPPED | OUTPATIENT
Start: 2025-07-17 | End: 2025-07-17

## 2025-08-02 DIAGNOSIS — J44.9 COPD, SEVERE (HCC): ICD-10-CM

## 2025-08-04 RX ORDER — FLUTICASONE FUROATE, UMECLIDINIUM BROMIDE AND VILANTEROL TRIFENATATE 100; 62.5; 25 UG/1; UG/1; UG/1
POWDER RESPIRATORY (INHALATION)
Qty: 60 EACH | Refills: 2 | Status: SHIPPED | OUTPATIENT
Start: 2025-08-04

## 2025-08-20 ENCOUNTER — OFFICE VISIT (OUTPATIENT)
Dept: PULMONOLOGY | Age: 70
End: 2025-08-20
Payer: MEDICARE

## 2025-08-20 VITALS
HEART RATE: 82 BPM | BODY MASS INDEX: 24.36 KG/M2 | OXYGEN SATURATION: 97 % | SYSTOLIC BLOOD PRESSURE: 118 MMHG | HEIGHT: 66 IN | WEIGHT: 151.6 LBS | DIASTOLIC BLOOD PRESSURE: 80 MMHG

## 2025-08-20 DIAGNOSIS — J44.9 COPD, SEVERE (HCC): Primary | ICD-10-CM

## 2025-08-20 DIAGNOSIS — Z87.891 PERSONAL HISTORY OF TOBACCO USE: ICD-10-CM

## 2025-08-20 PROCEDURE — G2211 COMPLEX E/M VISIT ADD ON: HCPCS | Performed by: INTERNAL MEDICINE

## 2025-08-20 PROCEDURE — 1160F RVW MEDS BY RX/DR IN RCRD: CPT | Performed by: INTERNAL MEDICINE

## 2025-08-20 PROCEDURE — G8400 PT W/DXA NO RESULTS DOC: HCPCS | Performed by: INTERNAL MEDICINE

## 2025-08-20 PROCEDURE — 3023F SPIROM DOC REV: CPT | Performed by: INTERNAL MEDICINE

## 2025-08-20 PROCEDURE — 3017F COLORECTAL CA SCREEN DOC REV: CPT | Performed by: INTERNAL MEDICINE

## 2025-08-20 PROCEDURE — 1036F TOBACCO NON-USER: CPT | Performed by: INTERNAL MEDICINE

## 2025-08-20 PROCEDURE — 3079F DIAST BP 80-89 MM HG: CPT | Performed by: INTERNAL MEDICINE

## 2025-08-20 PROCEDURE — 3074F SYST BP LT 130 MM HG: CPT | Performed by: INTERNAL MEDICINE

## 2025-08-20 PROCEDURE — 99214 OFFICE O/P EST MOD 30 MIN: CPT | Performed by: INTERNAL MEDICINE

## 2025-08-20 PROCEDURE — G0296 VISIT TO DETERM LDCT ELIG: HCPCS | Performed by: INTERNAL MEDICINE

## 2025-08-20 PROCEDURE — 1123F ACP DISCUSS/DSCN MKR DOCD: CPT | Performed by: INTERNAL MEDICINE

## 2025-08-20 PROCEDURE — 1090F PRES/ABSN URINE INCON ASSESS: CPT | Performed by: INTERNAL MEDICINE

## 2025-08-20 PROCEDURE — G8427 DOCREV CUR MEDS BY ELIG CLIN: HCPCS | Performed by: INTERNAL MEDICINE

## 2025-08-20 PROCEDURE — 1159F MED LIST DOCD IN RCRD: CPT | Performed by: INTERNAL MEDICINE

## 2025-08-20 PROCEDURE — G8420 CALC BMI NORM PARAMETERS: HCPCS | Performed by: INTERNAL MEDICINE
